# Patient Record
Sex: MALE | Race: WHITE | NOT HISPANIC OR LATINO | Employment: FULL TIME | URBAN - METROPOLITAN AREA
[De-identification: names, ages, dates, MRNs, and addresses within clinical notes are randomized per-mention and may not be internally consistent; named-entity substitution may affect disease eponyms.]

---

## 2021-10-13 ENCOUNTER — TELEPHONE (OUTPATIENT)
Dept: PULMONOLOGY | Facility: CLINIC | Age: 46
End: 2021-10-13

## 2021-10-19 ENCOUNTER — CONSULT (OUTPATIENT)
Dept: PULMONOLOGY | Facility: CLINIC | Age: 46
End: 2021-10-19
Payer: COMMERCIAL

## 2021-10-19 VITALS
TEMPERATURE: 96.7 F | HEART RATE: 71 BPM | BODY MASS INDEX: 45.1 KG/M2 | HEIGHT: 70 IN | OXYGEN SATURATION: 98 % | DIASTOLIC BLOOD PRESSURE: 70 MMHG | WEIGHT: 315 LBS | SYSTOLIC BLOOD PRESSURE: 125 MMHG

## 2021-10-19 DIAGNOSIS — E66.01 MORBID OBESITY (HCC): ICD-10-CM

## 2021-10-19 DIAGNOSIS — I10 PRIMARY HYPERTENSION: ICD-10-CM

## 2021-10-19 DIAGNOSIS — G47.33 OSA (OBSTRUCTIVE SLEEP APNEA): Primary | ICD-10-CM

## 2021-10-19 PROCEDURE — 99204 OFFICE O/P NEW MOD 45 MIN: CPT | Performed by: INTERNAL MEDICINE

## 2021-10-19 RX ORDER — SPIRONOLACTONE 25 MG/1
TABLET ORAL
COMMUNITY
Start: 2021-10-18

## 2021-10-19 RX ORDER — AMLODIPINE BESYLATE 5 MG/1
TABLET ORAL
COMMUNITY
Start: 2021-10-18

## 2021-10-19 RX ORDER — DOXAZOSIN MESYLATE 4 MG/1
4 TABLET ORAL 2 TIMES DAILY
COMMUNITY
Start: 2021-09-25

## 2021-10-19 RX ORDER — ALLOPURINOL 100 MG/1
TABLET ORAL
COMMUNITY
Start: 2021-10-18

## 2021-10-19 RX ORDER — METOPROLOL TARTRATE 50 MG/1
TABLET, FILM COATED ORAL
COMMUNITY
Start: 2021-10-18

## 2021-10-19 RX ORDER — ACETAMINOPHEN 325 MG/1
TABLET ORAL
COMMUNITY

## 2021-10-19 RX ORDER — DULAGLUTIDE 0.75 MG/.5ML
INJECTION, SOLUTION SUBCUTANEOUS
COMMUNITY
Start: 2021-09-22

## 2021-10-19 RX ORDER — SIMVASTATIN 10 MG
TABLET ORAL
COMMUNITY
Start: 2021-10-18

## 2021-10-19 RX ORDER — HYDROCHLOROTHIAZIDE 25 MG/1
TABLET ORAL
COMMUNITY
Start: 2021-10-18

## 2021-10-19 RX ORDER — HYDRALAZINE HYDROCHLORIDE 50 MG/1
TABLET, FILM COATED ORAL
COMMUNITY
Start: 2021-10-18

## 2021-10-19 RX ORDER — ALPRAZOLAM 0.5 MG/1
0.5 TABLET ORAL EVERY 8 HOURS PRN
COMMUNITY
Start: 2021-09-21

## 2021-10-19 RX ORDER — LOSARTAN POTASSIUM 100 MG/1
TABLET ORAL
COMMUNITY
Start: 2021-10-18

## 2021-10-19 RX ORDER — NAPROXEN SODIUM 220 MG
TABLET ORAL
COMMUNITY

## 2021-10-19 RX ORDER — MULTIVIT-MIN/IRON/FOLIC ACID/K 18-600-40
1 CAPSULE ORAL DAILY
COMMUNITY

## 2022-01-03 ENCOUNTER — HOSPITAL ENCOUNTER (OUTPATIENT)
Dept: SLEEP CENTER | Facility: CLINIC | Age: 47
Discharge: HOME | End: 2022-01-03
Payer: COMMERCIAL

## 2022-01-03 DIAGNOSIS — G47.33 OSA (OBSTRUCTIVE SLEEP APNEA): ICD-10-CM

## 2022-01-03 PROCEDURE — 95811 POLYSOM 6/>YRS CPAP 4/> PARM: CPT | Performed by: INTERNAL MEDICINE

## 2022-01-03 PROCEDURE — 95811 POLYSOM 6/>YRS CPAP 4/> PARM: CPT

## 2022-01-04 NOTE — PROGRESS NOTES
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   Caffeine use:YES- coffee  6 to 18 ounces and soda  12 ounces    Alcohol:Dayshift workers after 5PM: Alcohol use:NO    Typical day for patient:        Study Documentation    Sleep Study Indications: CPAP re-titration study    Sleep Study: Treatment   Optimal PAP pressure: 20/16 cmH2O  Leak:Small  Snore:Eliminated  REM Obtained:yes  Supplemental O2: no    Minimum SaO2 90%  Baseline SaO2 98%  PAP mask tried (list all) large Resmed Airfit F20 full face  PAP mask choice (final) large Resmed Airfit F20 full face  PAP mask type:full face  PAP pressure at which snoring was eliminated 20/16 cmH2O  Minimum SaO2 at final PAP pressure 93%  Mode of Therapy:CPAP  ETCO2:No  CPAP changed to BiPAP:Yes  If yes why intolerance to CPAP    Mode of Therapy:BiPAP    EKG abnormalities: yes  PVC  Epoch 1183     EEG abnormalities: no    Sleep Study Recorded < 2 hours: N/A    Sleep Study Recorded > 2 hours but incomplete study: N/A    Sleep Study Recorded 6 hours but no sleep obtained: NO    Patient classification: employed       Post-Sleep Study    Medication used at bedtime or during sleep study:YES other prescription medications    Patient reports time it took to fall asleep:30 to 60 minutes    Patient reports waking up during study:1 to 2 times  Patient reports returning to sleep in 10 to 30 minutes  Patient reports sleeping 4 to 6 hours without dreaming  Patient reports sleep during study:typical    Patient rated sleepiness: Somewhat sleepy or tired    PAP treatment:yes: Post PAP treatment patient reports feeling unchanged and would wear PAP mask at home

## 2022-01-05 DIAGNOSIS — G47.33 OSA (OBSTRUCTIVE SLEEP APNEA): Primary | ICD-10-CM

## 2022-01-05 NOTE — PROGRESS NOTES
Sleep study results discussed with patient    BiPAP ordered through the [facility]   Patient already on treatment with CPAP

## 2022-01-11 ENCOUNTER — OFFICE VISIT (OUTPATIENT)
Dept: PULMONOLOGY | Facility: CLINIC | Age: 47
End: 2022-01-11
Payer: COMMERCIAL

## 2022-01-11 VITALS
TEMPERATURE: 97.2 F | HEART RATE: 76 BPM | BODY MASS INDEX: 45.1 KG/M2 | DIASTOLIC BLOOD PRESSURE: 80 MMHG | SYSTOLIC BLOOD PRESSURE: 128 MMHG | HEIGHT: 70 IN | OXYGEN SATURATION: 98 % | WEIGHT: 315 LBS

## 2022-01-11 DIAGNOSIS — E66.01 MORBID OBESITY (HCC): ICD-10-CM

## 2022-01-11 DIAGNOSIS — I10 PRIMARY HYPERTENSION: ICD-10-CM

## 2022-01-11 DIAGNOSIS — G47.33 OSA (OBSTRUCTIVE SLEEP APNEA): Primary | ICD-10-CM

## 2022-01-11 PROCEDURE — 99213 OFFICE O/P EST LOW 20 MIN: CPT | Performed by: INTERNAL MEDICINE

## 2022-01-11 RX ORDER — ROSUVASTATIN CALCIUM 10 MG/1
10 TABLET, COATED ORAL DAILY
COMMUNITY
Start: 2021-12-19

## 2022-01-11 NOTE — PROGRESS NOTES
Assessment/Plan:    SANJIV (obstructive sleep apnea)  Patient was diagnosed 10 years ago   Patient had sleep study and titration study in Nov 2021  BiPAP with 20/16 pressures is recommended, however patient has not recieved the new machine yet  Previously was compliant with CPAP machine, had no issues with the mask  - continue on CPAP for now  - patient is motivated to start BiPAP whn the machine arrives      Hypertension  tachon is on multiple medications for HTN amlodipin, losartan, hydralasine, spironolactone  BP is good today in the office  - managed by pcp    Morbid obesity ([facility] )  - counselled on weight loss       Diagnoses and all orders for this visit:    SANJIV (obstructive sleep apnea)    Morbid obesity ([facility] [address] )    Primary hypertension    Other orders  -     rosuvastatin (CRESTOR) 10 MG tablet; Take 10 mg by mouth daily (Patient not taking: Reported on 1/11/2022 )          Subjective:      Patient ID: Ana M Maciel is a 48 y o  male  HPI  Patient comes for follow up of his SANJIV  He HS of SANJIV for 10 years and pt was compliant with CPAP machine  Patient is morbidly obese  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  He had titration study done on 10/19 that showd that he needs BiPAP with 20/16 pressure  The machine was ordered but he has not received it yet  Patient sleeps about 6 h at night and wakes up restful  He sometimes feels sleepy and tired diring the day  Patient is vaccinated for covid  He is not a smoker  He denies SB, chest pain, leg swelling  Review of Systems   Constitutional: Positive for fatigue  Negative for activity change, appetite change, chills, fever and unexpected weight change  HENT: Negative  Eyes: Negative  Respiratory: Negative for apnea, cough, chest tightness, shortness of breath and wheezing  Cardiovascular: Negative for chest pain, palpitations and leg swelling  Skin: Negative for pallor and rash     Neurological: Negative for weakness, light-headedness, numbness and headaches  Psychiatric/Behavioral: Negative  Objective:      /80 (BP Location: Left arm, Patient Position: Sitting, Cuff Size: Large)   Pulse 76   Temp (!) 97 2 °F (36 2 °C) (Tympanic)   Ht 5' 9 5" (1 765 m)   Wt (!) 151 kg (333 lb 4 oz)   SpO2 98%   BMI 48 51 kg/m²          Physical Exam  Constitutional:       General: He is not in acute distress  Appearance: He is well-developed  He is obese  He is not ill-appearing or toxic-appearing  HENT:      Head: Normocephalic and atraumatic  Eyes:      General: No scleral icterus  Extraocular Movements: Extraocular movements intact  Neck:      Thyroid: No thyromegaly  Vascular: No JVD  Cardiovascular:      Rate and Rhythm: Normal rate and regular rhythm  Heart sounds: Normal heart sounds  No murmur heard  No friction rub  No gallop  Pulmonary:      Effort: Pulmonary effort is normal       Breath sounds: Normal breath sounds  No wheezing, rhonchi or rales  Abdominal:      General: Bowel sounds are normal  There is no distension  Palpations: Abdomen is soft  Tenderness: There is no abdominal tenderness  There is no guarding  Musculoskeletal:         General: No tenderness  Normal range of motion  Right lower leg: No edema  Left lower leg: No edema  Skin:     General: Skin is warm  Findings: No erythema or rash  Neurological:      Mental Status: He is alert and oriented to person, place, and time  Cranial Nerves: No cranial nerve deficit     Psychiatric:         Mood and Affect: Mood normal

## 2022-01-11 NOTE — ASSESSMENT & PLAN NOTE
dayanna is on multiple medications for HTN amlodipin, losartan, hydralasine, spironolactone  BP is good today in the office  - managed by pcp

## 2022-01-11 NOTE — LETTER
January 11, 2022     Jacquelin Jeans, MD  [address] [place] [address]    Patient: Amilcar Fuentes   YOB: 1975   Date of Visit: 1/11/2022       Dear Dr Ludivina Stringer: Thank you for referring Verna Whiting to me for evaluation  Below are my notes for this consultation  If you have questions, please do not hesitate to call me  I look forward to following your patient along with you  Sincerely,        Ximena Pineda MD        CC: No Recipients  Kushal Cueva MD  1/11/2022  1:55 PM  Attested  Assessment/Plan:    SANJIV (obstructive sleep apnea)  Patient was diagnosed 10 years ago   Patient had sleep study and titration study in Nov 2021  BiPAP with 20/16 pressures is recommended, however patient has not recieved the new machine yet  Previously was compliant with CPAP machine, had no issues with the mask  - continue on CPAP for now  - patient is motivated to start BiPAP whn the machine arrives      Hypertension  patietn is on multiple medications for HTN amlodipin, losartan, hydralasine, spironolactone  BP is good today in the office  - managed by pcp    Morbid obesity ([facility] [address] )  - counselled on weight loss       Diagnoses and all orders for this visit:    SANJIV (obstructive sleep apnea)    Morbid obesity ([facility] [address] )    Primary hypertension    Other orders  -     rosuvastatin (CRESTOR) 10 MG tablet; Take 10 mg by mouth daily (Patient not taking: Reported on 1/11/2022 )          Subjective:      Patient ID: Adriel Roca is a 48 y o  male  HPI  Patient comes for follow up of his SANJIV  He HS of SANJIV for 10 years and pt was compliant with CPAP machine  Patient is morbidly obese  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  He had titration study done on 10/19 that showd that he needs BiPAP with 20/16 pressure  The machine was ordered but he has not received it yet  Patient sleeps about 6 h at night and wakes up restful   He sometimes feels sleepy and tired diring the day  Patient is vaccinated for covid  He is not a smoker  He denies SB, chest pain, leg swelling  Review of Systems   Constitutional: Positive for fatigue  Negative for activity change, appetite change, chills, fever and unexpected weight change  HENT: Negative  Eyes: Negative  Respiratory: Negative for apnea, cough, chest tightness, shortness of breath and wheezing  Cardiovascular: Negative for chest pain, palpitations and leg swelling  Skin: Negative for pallor and rash  Neurological: Negative for weakness, light-headedness, numbness and headaches  Psychiatric/Behavioral: Negative  Objective:      /80 (BP Location: Left arm, Patient Position: Sitting, Cuff Size: Large)   Pulse 76   Temp (!) 97 2 °F (36 2 °C) (Tympanic)   Ht 5' 9 5" (1 765 m)   Wt (!) 151 kg (333 lb 4 oz)   SpO2 98%   BMI 48 51 kg/m²          Physical Exam  Constitutional:       General: He is not in acute distress  Appearance: He is well-developed  He is obese  He is not ill-appearing or toxic-appearing  HENT:      Head: Normocephalic and atraumatic  Eyes:      General: No scleral icterus  Extraocular Movements: Extraocular movements intact  Neck:      Thyroid: No thyromegaly  Vascular: No JVD  Cardiovascular:      Rate and Rhythm: Normal rate and regular rhythm  Heart sounds: Normal heart sounds  No murmur heard  No friction rub  No gallop  Pulmonary:      Effort: Pulmonary effort is normal       Breath sounds: Normal breath sounds  No wheezing, rhonchi or rales  Abdominal:      General: Bowel sounds are normal  There is no distension  Palpations: Abdomen is soft  Tenderness: There is no abdominal tenderness  There is no guarding  Musculoskeletal:         General: No tenderness  Normal range of motion  Right lower leg: No edema  Left lower leg: No edema  Skin:     General: Skin is warm  Findings: No erythema or rash     Neurological: Mental Status: He is alert and oriented to person, place, and time  Cranial Nerves: No cranial nerve deficit  Psychiatric:         Mood and Affect: Mood normal          Attestation signed by Belgica Ariza MD at 1/11/2022  5:10 PM:  Pulmonary attending:  Dr Akanksha Hodge    The patient examined, plan discussed with resident, reviewed and agree with resident notes with corrections  Patient with obstructive sleep apnea currently requiring BiPAP 20/16 cm of water  Has been using his old machine  [facility] BiPAP machine has been ordered, waiting to be set up  Still has some daytime sleepiness  Very motivated to continue on CPAP therapy  His compliance records are not available  He has history of hypertension has been on multiple medications including spironolactone hydralazine losartan and amlodipine  He is also morbidly obese and understands the need for weight reduction his chest was clear to auscultation  He has COVID vaccinated    I had a long discussion with him and answered all his questions

## 2022-01-11 NOTE — ASSESSMENT & PLAN NOTE
Patient was diagnosed 10 years ago   Patient had sleep study and titration study in Nov 2021  BiPAP with 20/16 pressures is recommended, however patient has not recieved the new machine yet  Previously was compliant with CPAP machine, had no issues with the mask  - continue on CPAP for now  - patient is motivated to start BiPAP whn the machine arrives

## 2022-04-05 ENCOUNTER — TELEPHONE (OUTPATIENT)
Dept: OTHER | Facility: OTHER | Age: 47
End: 2022-04-05

## 2022-04-05 NOTE — TELEPHONE ENCOUNTER
Patient called in to confirm appointment day and time for next week  Provided patient with appointment details and advised he call back if he has any other questions

## 2022-04-12 ENCOUNTER — OFFICE VISIT (OUTPATIENT)
Dept: PULMONOLOGY | Facility: CLINIC | Age: 47
End: 2022-04-12
Payer: COMMERCIAL

## 2022-04-12 VITALS
HEIGHT: 70 IN | DIASTOLIC BLOOD PRESSURE: 75 MMHG | HEART RATE: 78 BPM | OXYGEN SATURATION: 98 % | WEIGHT: 315 LBS | TEMPERATURE: 98.4 F | SYSTOLIC BLOOD PRESSURE: 126 MMHG | BODY MASS INDEX: 45.1 KG/M2

## 2022-04-12 DIAGNOSIS — E66.01 MORBID OBESITY (HCC): ICD-10-CM

## 2022-04-12 DIAGNOSIS — G47.33 OSA (OBSTRUCTIVE SLEEP APNEA): Primary | ICD-10-CM

## 2022-04-12 DIAGNOSIS — I10 PRIMARY HYPERTENSION: ICD-10-CM

## 2022-04-12 PROCEDURE — 99213 OFFICE O/P EST LOW 20 MIN: CPT | Performed by: INTERNAL MEDICINE

## 2022-04-12 NOTE — PROGRESS NOTES
Assessment/Plan:    SANJIV (obstructive sleep apnea)  Mr Vel Fuentes was diagnosed with obstructive sleep apnea more than 10 years back and was started on CPAP therapy  He has been using the CPAP regularly since then  His last titration study was more than 5 years back  He was buying the supplies online  Previously he was with the [facility] medical   He used to follow with Dr Wesley Diaz     He has hypertension as comorbidity  On clinical examination, he was morbidly obese and had Mallampati class 3 oropharyngeal crowding  his repeat sleep study showed BiPAP requirement of 20/16 with a full face mask to correct his sleep apnea  He has been using the BIPAP regularly and his compliance is excellent  His residual AHI was low  He is motivated to continue on BiPAP therapy  He is getting clinical benefit from BiPAP therapy  I had a long discussion with him and I have answered all his questions  Morbid obesity ([address] )  He is morbidly obese and I have counseled him to lose weight  With COVID he has gained more weight and states that he is trying to lose weight at this point  I have advised him regarding modifying diet and exercising regularly        Hypertension  He has history of hypertension and has been on treatment with metoprolol losartan hydrochlorothiazide and doxazosin          Diagnoses and all orders for this visit:    SANJIV (obstructive sleep apnea)    Primary hypertension    Morbid obesity (HCC)          Subjective:      Patient ID: Marianne Carlos is a 48 y o  male  Mr Vel Fuentes for follow-up for his obstructive sleep apnea on CPAP therapy  Currently he is using the CPAP every night and is comfortable with the mask and pressure  He has no significant daytime sleepiness or morning headache  I reviewed his CPAP compliance records and they are excellent  His residual AHI is very low  He is very motivated to continue on CPAP therapy    He is also very obese and understands the need for weight reduction  He has history of hypertension and has been on treatment with spironolactone and hydrochlorothiazide  The following portions of the patient's history were reviewed and updated as appropriate: allergies, current medications, past family history, past medical history, past social history, past surgical history and problem list     Review of Systems   Constitutional: Negative for chills and fever  HENT: Negative for hearing loss, rhinorrhea, sneezing, sore throat and trouble swallowing  Eyes: Negative for visual disturbance  Respiratory: Negative for cough, shortness of breath and wheezing  Cardiovascular: Negative for chest pain, palpitations and leg swelling  Gastrointestinal: Negative for abdominal pain, constipation, diarrhea, nausea and vomiting  Genitourinary: Negative for dysuria, frequency and urgency  Musculoskeletal: Positive for arthralgias  Skin: Negative for rash  Allergic/Immunologic: Negative for environmental allergies  Neurological: Negative for dizziness, seizures, syncope, light-headedness and headaches  Psychiatric/Behavioral: Negative for agitation and sleep disturbance  The patient is not nervous/anxious  Objective:      /75 (BP Location: Left arm, Patient Position: Sitting, Cuff Size: Adult)   Pulse 78   Temp 98 4 °F (36 9 °C) (Tympanic)   Ht 5' 9 5" (1 765 m)   Wt (!) 150 kg (330 lb)   SpO2 98%   BMI 48 03 kg/m²          Physical Exam  Vitals reviewed  Constitutional:       General: He is not in acute distress  Appearance: He is obese  He is not ill-appearing, toxic-appearing or diaphoretic  HENT:      Head: Normocephalic  Mouth/Throat:      Mouth: Mucous membranes are moist    Eyes:      General: No scleral icterus  Conjunctiva/sclera: Conjunctivae normal    Cardiovascular:      Rate and Rhythm: Normal rate and regular rhythm  Heart sounds: Normal heart sounds  No murmur heard        Pulmonary: Effort: No respiratory distress  Breath sounds: Normal breath sounds  No stridor  No wheezing, rhonchi or rales  Abdominal:      General: Bowel sounds are normal       Palpations: Abdomen is soft  Tenderness: There is no abdominal tenderness  There is no guarding  Musculoskeletal:      Cervical back: No rigidity  Right lower leg: No edema  Left lower leg: No edema  Lymphadenopathy:      Cervical: No cervical adenopathy  Skin:     Coloration: Skin is not jaundiced or pale  Findings: No rash  Neurological:      Mental Status: He is alert and oriented to person, place, and time  Gait: Gait normal    Psychiatric:         Mood and Affect: Mood normal          Behavior: Behavior normal          Thought Content:  Thought content normal          Judgment: Judgment normal

## 2022-04-12 NOTE — ASSESSMENT & PLAN NOTE
He has history of hypertension and has been on treatment with metoprolol losartan hydrochlorothiazide and doxazosin  Laurence Press

## 2022-04-12 NOTE — ASSESSMENT & PLAN NOTE
Xiomara Mendoza was diagnosed with obstructive sleep apnea more than 10 years back and was started on CPAP therapy  He has been using the CPAP regularly since then  His last titration study was more than 5 years back  He was buying the supplies online  Previously he was with the [facility] medical   He used to follow with Dr Casimiro Ly     He has hypertension as comorbidity  On clinical examination, he was morbidly obese and had Mallampati class 3 oropharyngeal crowding  his repeat sleep study showed BiPAP requirement of 20/16 with a full face mask to correct his sleep apnea  He has been using the BIPAP regularly and his compliance is excellent  His residual AHI was low  He is motivated to continue on BiPAP therapy  He is getting clinical benefit from BiPAP therapy  I had a long discussion with him and I have answered all his questions

## 2022-04-12 NOTE — ASSESSMENT & PLAN NOTE
He is morbidly obese and I have counseled him to lose weight  With COVID he has gained more weight and states that he is trying to lose weight at this point    I have advised him regarding modifying diet and exercising regularly

## 2022-08-15 ENCOUNTER — HOSPITAL ENCOUNTER (INPATIENT)
Facility: HOSPITAL | Age: 47
LOS: 3 days | Discharge: HOME | DRG: 580 | End: 2022-08-18
Attending: INTERNAL MEDICINE | Admitting: STUDENT IN AN ORGANIZED HEALTH CARE EDUCATION/TRAINING PROGRAM
Payer: COMMERCIAL

## 2022-08-15 ENCOUNTER — APPOINTMENT (EMERGENCY)
Dept: CT IMAGING | Facility: HOSPITAL | Age: 47
DRG: 580 | End: 2022-08-15
Payer: COMMERCIAL

## 2022-08-15 DIAGNOSIS — I10 PRIMARY HYPERTENSION: ICD-10-CM

## 2022-08-15 DIAGNOSIS — L02.11 CELLULITIS AND ABSCESS OF NECK: Primary | ICD-10-CM

## 2022-08-15 DIAGNOSIS — E66.01 MORBID OBESITY (HCC): ICD-10-CM

## 2022-08-15 DIAGNOSIS — E11.9 TYPE 2 DIABETES MELLITUS (HCC): ICD-10-CM

## 2022-08-15 DIAGNOSIS — E11.65 UNCONTROLLED TYPE 2 DIABETES MELLITUS WITH HYPERGLYCEMIA (HCC): ICD-10-CM

## 2022-08-15 DIAGNOSIS — L03.221 CELLULITIS AND ABSCESS OF NECK: Primary | ICD-10-CM

## 2022-08-15 PROBLEM — E87.1 HYPONATREMIA: Status: ACTIVE | Noted: 2022-08-15

## 2022-08-15 PROBLEM — R79.89 ELEVATED SERUM CREATININE: Status: ACTIVE | Noted: 2022-08-15

## 2022-08-15 PROBLEM — E78.5 HYPERLIPIDEMIA: Status: ACTIVE | Noted: 2022-08-15

## 2022-08-15 PROBLEM — E87.2 LACTIC ACIDOSIS: Status: ACTIVE | Noted: 2022-08-15

## 2022-08-15 PROBLEM — E87.20 LACTIC ACIDOSIS: Status: ACTIVE | Noted: 2022-08-15

## 2022-08-15 LAB
ALBUMIN SERPL BCP-MCNC: 4.7 G/DL (ref 3.5–5)
ALP SERPL-CCNC: 66 U/L (ref 34–104)
ALT SERPL W P-5'-P-CCNC: 32 U/L (ref 7–52)
ANION GAP SERPL CALCULATED.3IONS-SCNC: 13 MMOL/L (ref 4–13)
AST SERPL W P-5'-P-CCNC: 26 U/L (ref 13–39)
BASOPHILS # BLD AUTO: 0.03 THOUSANDS/ΜL (ref 0–0.1)
BASOPHILS NFR BLD AUTO: 0 % (ref 0–1)
BILIRUB SERPL-MCNC: 0.38 MG/DL (ref 0.2–1)
BUN SERPL-MCNC: 29 MG/DL (ref 5–25)
CALCIUM SERPL-MCNC: 9.8 MG/DL (ref 8.4–10.2)
CHLORIDE SERPL-SCNC: 96 MMOL/L (ref 96–108)
CO2 SERPL-SCNC: 20 MMOL/L (ref 21–32)
CREAT SERPL-MCNC: 1.62 MG/DL (ref 0.6–1.3)
EOSINOPHIL # BLD AUTO: 0.08 THOUSAND/ΜL (ref 0–0.61)
EOSINOPHIL NFR BLD AUTO: 1 % (ref 0–6)
ERYTHROCYTE [DISTWIDTH] IN BLOOD BY AUTOMATED COUNT: 13.2 % (ref 11.6–15.1)
GFR SERPL CREATININE-BSD FRML MDRD: 49 ML/MIN/1.73SQ M
GLUCOSE SERPL-MCNC: 214 MG/DL (ref 65–140)
GLUCOSE SERPL-MCNC: 374 MG/DL (ref 65–140)
HCT VFR BLD AUTO: 37.9 % (ref 36.5–49.3)
HGB BLD-MCNC: 13.2 G/DL (ref 12–17)
IMM GRANULOCYTES # BLD AUTO: 0.03 THOUSAND/UL (ref 0–0.2)
IMM GRANULOCYTES NFR BLD AUTO: 0 % (ref 0–2)
LACTATE SERPL-SCNC: 1.5 MMOL/L (ref 0.5–2)
LACTATE SERPL-SCNC: 2.1 MMOL/L (ref 0.5–2)
LYMPHOCYTES # BLD AUTO: 0.91 THOUSANDS/ΜL (ref 0.6–4.47)
LYMPHOCYTES NFR BLD AUTO: 11 % (ref 14–44)
MCH RBC QN AUTO: 30.7 PG (ref 26.8–34.3)
MCHC RBC AUTO-ENTMCNC: 34.8 G/DL (ref 31.4–37.4)
MCV RBC AUTO: 88 FL (ref 82–98)
MONOCYTES # BLD AUTO: 0.53 THOUSAND/ΜL (ref 0.17–1.22)
MONOCYTES NFR BLD AUTO: 7 % (ref 4–12)
NEUTROPHILS # BLD AUTO: 6.38 THOUSANDS/ΜL (ref 1.85–7.62)
NEUTS SEG NFR BLD AUTO: 81 % (ref 43–75)
NRBC BLD AUTO-RTO: 0 /100 WBCS
PLATELET # BLD AUTO: 144 THOUSANDS/UL (ref 149–390)
PMV BLD AUTO: 9.2 FL (ref 8.9–12.7)
POTASSIUM SERPL-SCNC: 3.7 MMOL/L (ref 3.5–5.3)
PROT SERPL-MCNC: 8 G/DL (ref 6.4–8.4)
RBC # BLD AUTO: 4.3 MILLION/UL (ref 3.88–5.62)
SODIUM SERPL-SCNC: 129 MMOL/L (ref 135–147)
WBC # BLD AUTO: 7.96 THOUSAND/UL (ref 4.31–10.16)

## 2022-08-15 PROCEDURE — 87205 SMEAR GRAM STAIN: CPT | Performed by: INTERNAL MEDICINE

## 2022-08-15 PROCEDURE — 96365 THER/PROPH/DIAG IV INF INIT: CPT

## 2022-08-15 PROCEDURE — 70491 CT SOFT TISSUE NECK W/DYE: CPT

## 2022-08-15 PROCEDURE — G1004 CDSM NDSC: HCPCS

## 2022-08-15 PROCEDURE — 82948 REAGENT STRIP/BLOOD GLUCOSE: CPT

## 2022-08-15 PROCEDURE — 83036 HEMOGLOBIN GLYCOSYLATED A1C: CPT | Performed by: PHYSICIAN ASSISTANT

## 2022-08-15 PROCEDURE — 99284 EMERGENCY DEPT VISIT MOD MDM: CPT

## 2022-08-15 PROCEDURE — 83605 ASSAY OF LACTIC ACID: CPT | Performed by: INTERNAL MEDICINE

## 2022-08-15 PROCEDURE — 87077 CULTURE AEROBIC IDENTIFY: CPT | Performed by: INTERNAL MEDICINE

## 2022-08-15 PROCEDURE — 87186 SC STD MICRODIL/AGAR DIL: CPT | Performed by: INTERNAL MEDICINE

## 2022-08-15 PROCEDURE — 87070 CULTURE OTHR SPECIMN AEROBIC: CPT | Performed by: INTERNAL MEDICINE

## 2022-08-15 PROCEDURE — 36415 COLL VENOUS BLD VENIPUNCTURE: CPT | Performed by: INTERNAL MEDICINE

## 2022-08-15 PROCEDURE — 99285 EMERGENCY DEPT VISIT HI MDM: CPT | Performed by: INTERNAL MEDICINE

## 2022-08-15 PROCEDURE — 99222 1ST HOSP IP/OBS MODERATE 55: CPT | Performed by: PHYSICIAN ASSISTANT

## 2022-08-15 PROCEDURE — 96372 THER/PROPH/DIAG INJ SC/IM: CPT

## 2022-08-15 PROCEDURE — 80053 COMPREHEN METABOLIC PANEL: CPT | Performed by: INTERNAL MEDICINE

## 2022-08-15 PROCEDURE — 96367 TX/PROPH/DG ADDL SEQ IV INF: CPT

## 2022-08-15 PROCEDURE — 87154 CUL TYP ID BLD PTHGN 6+ TRGT: CPT | Performed by: INTERNAL MEDICINE

## 2022-08-15 PROCEDURE — 85025 COMPLETE CBC W/AUTO DIFF WBC: CPT | Performed by: INTERNAL MEDICINE

## 2022-08-15 PROCEDURE — 87040 BLOOD CULTURE FOR BACTERIA: CPT | Performed by: INTERNAL MEDICINE

## 2022-08-15 RX ORDER — SPIRONOLACTONE 25 MG/1
25 TABLET ORAL DAILY
Status: DISCONTINUED | OUTPATIENT
Start: 2022-08-16 | End: 2022-08-18 | Stop reason: HOSPADM

## 2022-08-15 RX ORDER — DOXAZOSIN MESYLATE 4 MG/1
4 TABLET ORAL 2 TIMES DAILY
Status: DISCONTINUED | OUTPATIENT
Start: 2022-08-15 | End: 2022-08-18 | Stop reason: HOSPADM

## 2022-08-15 RX ORDER — METOPROLOL TARTRATE 50 MG/1
50 TABLET, FILM COATED ORAL EVERY 12 HOURS SCHEDULED
Status: DISCONTINUED | OUTPATIENT
Start: 2022-08-15 | End: 2022-08-18 | Stop reason: HOSPADM

## 2022-08-15 RX ORDER — LOSARTAN POTASSIUM 50 MG/1
100 TABLET ORAL DAILY
Status: DISCONTINUED | OUTPATIENT
Start: 2022-08-16 | End: 2022-08-18 | Stop reason: HOSPADM

## 2022-08-15 RX ORDER — AMLODIPINE BESYLATE 5 MG/1
5 TABLET ORAL DAILY
Status: DISCONTINUED | OUTPATIENT
Start: 2022-08-16 | End: 2022-08-18 | Stop reason: HOSPADM

## 2022-08-15 RX ORDER — CEFAZOLIN SODIUM 2 G/50ML
2000 SOLUTION INTRAVENOUS EVERY 8 HOURS
Status: DISCONTINUED | OUTPATIENT
Start: 2022-08-15 | End: 2022-08-16

## 2022-08-15 RX ORDER — VANCOMYCIN HYDROCHLORIDE 1 G/200ML
15 INJECTION, SOLUTION INTRAVENOUS ONCE
Status: DISCONTINUED | OUTPATIENT
Start: 2022-08-15 | End: 2022-08-15

## 2022-08-15 RX ORDER — ONDANSETRON 2 MG/ML
4 INJECTION INTRAMUSCULAR; INTRAVENOUS EVERY 6 HOURS PRN
Status: DISCONTINUED | OUTPATIENT
Start: 2022-08-15 | End: 2022-08-18 | Stop reason: HOSPADM

## 2022-08-15 RX ORDER — ACETAMINOPHEN 325 MG/1
650 TABLET ORAL EVERY 6 HOURS PRN
Status: DISCONTINUED | OUTPATIENT
Start: 2022-08-15 | End: 2022-08-18 | Stop reason: HOSPADM

## 2022-08-15 RX ORDER — CEFTRIAXONE 1 G/50ML
1000 INJECTION, SOLUTION INTRAVENOUS ONCE
Status: COMPLETED | OUTPATIENT
Start: 2022-08-15 | End: 2022-08-15

## 2022-08-15 RX ORDER — HEPARIN SODIUM 5000 [USP'U]/ML
5000 INJECTION, SOLUTION INTRAVENOUS; SUBCUTANEOUS ONCE
Status: COMPLETED | OUTPATIENT
Start: 2022-08-15 | End: 2022-08-15

## 2022-08-15 RX ORDER — HYDRALAZINE HYDROCHLORIDE 25 MG/1
50 TABLET, FILM COATED ORAL EVERY 12 HOURS
Status: DISCONTINUED | OUTPATIENT
Start: 2022-08-15 | End: 2022-08-18 | Stop reason: HOSPADM

## 2022-08-15 RX ORDER — HYDROCHLOROTHIAZIDE 25 MG/1
25 TABLET ORAL DAILY
Status: DISCONTINUED | OUTPATIENT
Start: 2022-08-16 | End: 2022-08-18

## 2022-08-15 RX ORDER — INSULIN LISPRO 100 [IU]/ML
2-12 INJECTION, SOLUTION INTRAVENOUS; SUBCUTANEOUS
Status: DISCONTINUED | OUTPATIENT
Start: 2022-08-15 | End: 2022-08-18 | Stop reason: HOSPADM

## 2022-08-15 RX ORDER — PRAVASTATIN SODIUM 20 MG
20 TABLET ORAL
Status: DISCONTINUED | OUTPATIENT
Start: 2022-08-16 | End: 2022-08-18 | Stop reason: HOSPADM

## 2022-08-15 RX ORDER — INSULIN LISPRO 100 [IU]/ML
2-12 INJECTION, SOLUTION INTRAVENOUS; SUBCUTANEOUS
Status: DISCONTINUED | OUTPATIENT
Start: 2022-08-16 | End: 2022-08-18 | Stop reason: HOSPADM

## 2022-08-15 RX ORDER — SODIUM CHLORIDE 9 MG/ML
75 INJECTION, SOLUTION INTRAVENOUS CONTINUOUS
Status: DISCONTINUED | OUTPATIENT
Start: 2022-08-15 | End: 2022-08-16

## 2022-08-15 RX ORDER — ALPRAZOLAM 0.5 MG/1
0.5 TABLET ORAL EVERY 8 HOURS PRN
Status: DISCONTINUED | OUTPATIENT
Start: 2022-08-15 | End: 2022-08-18 | Stop reason: HOSPADM

## 2022-08-15 RX ADMIN — HYDRALAZINE HYDROCHLORIDE 50 MG: 25 TABLET, FILM COATED ORAL at 20:53

## 2022-08-15 RX ADMIN — METOPROLOL TARTRATE 50 MG: 50 TABLET, FILM COATED ORAL at 20:49

## 2022-08-15 RX ADMIN — ACETAMINOPHEN 650 MG: 325 TABLET, FILM COATED ORAL at 21:08

## 2022-08-15 RX ADMIN — DOXAZOSIN 4 MG: 4 TABLET ORAL at 20:49

## 2022-08-15 RX ADMIN — HEPARIN SODIUM 5000 UNITS: 5000 INJECTION INTRAVENOUS; SUBCUTANEOUS at 20:52

## 2022-08-15 RX ADMIN — VANCOMYCIN HYDROCHLORIDE 1500 MG: 1 INJECTION, POWDER, LYOPHILIZED, FOR SOLUTION INTRAVENOUS at 18:07

## 2022-08-15 RX ADMIN — INSULIN HUMAN 8 UNITS: 100 INJECTION, SOLUTION PARENTERAL at 18:05

## 2022-08-15 RX ADMIN — SODIUM CHLORIDE 150 ML/HR: 0.9 INJECTION, SOLUTION INTRAVENOUS at 17:15

## 2022-08-15 RX ADMIN — SODIUM CHLORIDE 1000 ML: 0.9 INJECTION, SOLUTION INTRAVENOUS at 18:08

## 2022-08-15 RX ADMIN — INSULIN LISPRO 4 UNITS: 100 INJECTION, SOLUTION INTRAVENOUS; SUBCUTANEOUS at 21:08

## 2022-08-15 RX ADMIN — IOHEXOL 65 ML: 350 INJECTION, SOLUTION INTRAVENOUS at 17:54

## 2022-08-15 RX ADMIN — CEFTRIAXONE 1000 MG: 1 INJECTION, SOLUTION INTRAVENOUS at 17:17

## 2022-08-15 RX ADMIN — CEFAZOLIN SODIUM 2000 MG: 2 SOLUTION INTRAVENOUS at 20:52

## 2022-08-15 NOTE — H&P
[address]   H&P- Holly Prieto Digirolamo 1975, 48 y o  male MRN: [id number]  Unit/Bed#: -01 Encounter: [id number]  Primary Care Provider: Dorota Canales MD   Date and time admitted to hospital: 8/15/2022  3:54 PM    * Cellulitis and abscess of neck  Assessment & Plan  Patient presents with 2 days of right posterior neck pain, erythema, drainage  States that he believes its from his BiPAP straps at home  · CT soft tissue neck w/ contrast: deep and superficial soft tissue cellulitis in the right suboccipital region  Superficial subcutaneous 6mm abscess   · Currently afebrile and without leukocytosis  Hemodynamically stable  · Lactic acid 2 1, repeat in 2h     · NPO at midnight for abscess drainage tomorrow   · IVF   · IV abx: Cefazolin + Vancomycin   · General surgery consult appreciated    Type 2 diabetes mellitus ([facility] [address] )  Assessment & Plan  No results found for: HGBA1C    No results for input(s): POCGLU in the last 72 hours      Blood Sugar Average: Last 72 hrs:     · Check HgbA1c  · Hyperglycemia 374 on admission, given 8U regular insulin in the ED   · PTA regimen: metformin and Trulicity although patient states he has not been using Trulicity because he thought that was a reflux medication   · Inpatient regimen: SSI with accuchecks- adjust as necessary   · Patient states his glucometer broke, he will need new supplies at discharge  · Avoid hypoglycemia     Elevated serum creatinine  Assessment & Plan  · Creatinine 1 62 with GFR 49 on admission   · No prior labs available for review, unknown baseline   · IVF    · I/O  · Avoid nephrotoxins/hypotension   · Continue to trend Cr     Hyponatremia  Assessment & Plan  · Pseudohyponatremia in setting of hyperglycemia   · Corrected sodium for hyperglycemia 133   · Will continue HCTZ for now due to hypertension, if sodium worsens despite correcting hyperglycemia, consider holding    Hyperlipidemia  Assessment & Plan  Continue statin     SANJIV (obstructive sleep apnea)  Assessment & Plan  · Continue BiPAP qhs as tolerated with pain, patient with right suboccipital cellulitis with abscess    Hypertension  Assessment & Plan  · BP elevated on admission 171/84  · Continue PTA antihypertensives: doxazosin, hydralazine, Norvasc, HCTZ, losartan, spironolactone, lopressor     Morbid obesity (HCC)  Assessment & Plan  · BMI 48 73  · Encourage lifestyle modification/weight loss   · Nutrition consult       VTE Pharmacologic Prophylaxis: VTE Score: 4 Moderate Risk (Score 3-4) - Pharmacological DVT Prophylaxis Ordered: heparin  Code Status: Level 1 - Full Code   Discussion with family: Patient declined call to   Anticipated Length of Stay: Patient will be admitted on an inpatient basis with an anticipated length of stay of greater than 2 midnights secondary to right neck cellulitis with abscess   Total Time for Visit, including Counseling / Coordination of Care: 45 minutes Greater than 50% of this total time spent on direct patient counseling and coordination of care  Chief Complaint: right posterior neck pain x 2 days     History of Present Illness:  Ruthell Fleischer is a 48 y o  male with a PMH of morbid obesity, HTN, HLD, T2DM, SANJIV on BiPAP qhs who presents with right posterior neck pain, erythema, drainage x 2 days  He denies any fever/chills at home  He believes that this all started due to irritation from his BiPAP straps which he uses at night for his SANJIV  He states that he currently is the owner of a local [employer] and is hoping to be back to work by Wednesday  He states that in regards to his diabetes, he has not been checking his sugars at home because his glucometer broke  He also states he was recently started on Trulicity but has not been taking this medication because he thought it was a PRN med for GERD  He endorses difficulty with weight loss due to his job working in an [employer]       Review of Systems:  Review of Systems   Constitutional: Negative for activity change, appetite change, chills, fatigue and fever  HENT: Negative for sore throat  Eyes: Negative for visual disturbance  Respiratory: Negative for cough and shortness of breath  Cardiovascular: Negative for chest pain, palpitations and leg swelling  Gastrointestinal: Negative for abdominal pain, diarrhea, nausea and vomiting  Genitourinary: Negative for dysuria  Musculoskeletal: Positive for neck pain  Skin: Positive for color change  Neurological: Negative for dizziness, weakness, light-headedness and headaches  Psychiatric/Behavioral: Negative for confusion  Past Medical and Surgical History:   Past Medical History:   Diagnosis Date    Sleep apnea, obstructive        History reviewed  No pertinent surgical history  Meds/Allergies:  Prior to Admission medications    Medication Sig Start Date End Date Taking?  Authorizing Provider   acetaminophen (Tylenol) 325 mg tablet Take by mouth    Historical Provider, MD   allopurinol (ZYLOPRIM) 100 mg tablet  10/18/21   Historical Provider, MD   ALPRAZolam [place]) 0 5 mg tablet Take 0 5 mg by mouth every 8 (eight) hours as needed 9/21/21   Historical Provider, MD   amLODIPine (NORVASC) 5 mg tablet  10/18/21   Historical Provider, MD   Aspirin Buf,CaCarb-MgCarb-MgO, 81 MG TABS Take 1 tablet by mouth daily    Historical Provider, MD   Cholecalciferol (Vitamin D) 50 MCG (2000 UT) CAPS Take 1 tablet by mouth daily    Historical Provider, MD   doxazosin (CARDURA) 4 mg tablet Take 4 mg by mouth 2 (two) times a day 9/25/21   Historical Provider, MD   hydrALAZINE (APRESOLINE) 50 mg tablet  10/18/21   Historical Provider, MD   hydrochlorothiazide (HYDRODIURIL) 25 mg tablet  10/18/21   Historical Provider, MD   losartan (COZAAR) 100 MG tablet  10/18/21   Historical Provider, MD   metFORMIN (GLUCOPHAGE) 1000 MG tablet  10/18/21   Historical Provider, MD   metoprolol tartrate (LOPRESSOR) 50 mg tablet 10/18/21   Historical Provider, MD   Misc Natural Products (OSTEO BI-FLEX/5-LOXIN ADVANCED PO) Take by mouth 8/19/14   Historical Provider, MD   Multiple Vitamin (MULTIVITAMIN ADULT PO) Take by mouth    Historical Provider, MD   naproxen sodium (Aleve) 220 MG tablet Take by mouth    Historical Provider, MD   rosuvastatin (CRESTOR) 10 MG tablet Take 10 mg by mouth daily  Patient not taking: Reported on 1/11/2022 12/19/21   Historical Provider, MD   simvastatin (ZOCOR) 10 mg tablet  10/18/21   Historical Provider, MD   spironolactone (ALDACTONE) 25 mg tablet  10/18/21   Historical Provider, MD   Trulicity 2 41 GO/8 0FA SOPN inject 0 5 milliliters ( 0 75 milligrams ) subcutaneously every week 9/22/21   Historical Provider, MD     I have reviewed home medications with patient personally  Allergies: No Known Allergies    Social History:  Marital Status: Single   Patient Pre-hospital Living Situation: Home  Patient Pre-hospital Level of Mobility: walks  Patient Pre-hospital Diet Restrictions: diabetic   Substance Use History:   Social History     Substance and Sexual Activity   Alcohol Use Never     Social History     Tobacco Use   Smoking Status Never Smoker   Smokeless Tobacco Current User    Types: Chew   Tobacco Comment    chewing tobacco occasionally      Social History     Substance and Sexual Activity   Drug Use Never       Family History:  History reviewed  No pertinent family history  Physical Exam:     Vitals:   Blood Pressure: 160/85 (08/15/22 2001)  Pulse: 103 (08/15/22 2001)  Temperature: 98 2 °F (36 8 °C) (08/15/22 2001)  Temp Source: Tympanic (08/15/22 2001)  Respirations: 16 (08/15/22 2001)  Height: 5' 9" (175 3 cm) (08/15/22 2001)  Weight - Scale: (!) 149 kg (329 lb 9 4 oz) (08/15/22 2001)  SpO2: 98 % (08/15/22 2001)    Physical Exam  Constitutional:       General: He is not in acute distress  Appearance: He is obese  He is not ill-appearing or toxic-appearing     HENT:      Mouth/Throat: Mouth: Mucous membranes are moist    Eyes:      General: No scleral icterus  Cardiovascular:      Rate and Rhythm: Normal rate and regular rhythm  Heart sounds: Normal heart sounds  Pulmonary:      Effort: No respiratory distress  Breath sounds: Normal breath sounds  No wheezing, rhonchi or rales  Abdominal:      General: Abdomen is flat  Bowel sounds are normal       Palpations: Abdomen is soft  Tenderness: There is no abdominal tenderness  Musculoskeletal:      Right lower leg: No edema  Left lower leg: No edema  Skin:     General: Skin is warm  Findings: Erythema present  Comments: Right posterior neck with fluctuant abscess with surrounding erythema, swelling, and TTP; purulent discharge noted   Neurological:      Mental Status: He is alert and oriented to person, place, and time  Psychiatric:         Mood and Affect: Mood normal           Additional Data:     Lab Results:  Results from last 7 days   Lab Units 08/15/22  1707   WBC Thousand/uL 7 96   HEMOGLOBIN g/dL 13 2   HEMATOCRIT % 37 9   PLATELETS Thousands/uL 144*   NEUTROS PCT % 81*   LYMPHS PCT % 11*   MONOS PCT % 7   EOS PCT % 1     Results from last 7 days   Lab Units 08/15/22  1707   SODIUM mmol/L 129*   POTASSIUM mmol/L 3 7   CHLORIDE mmol/L 96   CO2 mmol/L 20*   BUN mg/dL 29*   CREATININE mg/dL 1 62*   ANION GAP mmol/L 13   CALCIUM mg/dL 9 8   ALBUMIN g/dL 4 7   TOTAL BILIRUBIN mg/dL 0 38   ALK PHOS U/L 66   ALT U/L 32   AST U/L 26   GLUCOSE RANDOM mg/dL 374*                 Results from last 7 days   Lab Units 08/15/22  1707   LACTIC ACID mmol/L 2 1*       Imaging: Reviewed radiology reports from this admission including: CT soft tissue neck   CT soft tissue neck with contrast   Final Result by Jose Garcia MD (08/15 1821)      Deep and superficial soft tissue cellulitis in the right suboccipital region  Superficial subcutaneous 6 mm abscess              Workstation performed: [id number] EKG and Other Studies Reviewed on Admission:   · EKG: No EKG obtained  ** Please Note: This note has been constructed using a voice recognition system   **

## 2022-08-15 NOTE — ASSESSMENT & PLAN NOTE
No results found for: HGBA1C    No results for input(s): POCGLU in the last 72 hours      Blood Sugar Average: Last 72 hrs:     · Check HgbA1c  · Hyperglycemia 374 on admission, given 8U regular insulin in the ED   · PTA regimen: metformin and Trulicity although patient states he has not been using Trulicity because he thought that was a reflux medication   · Inpatient regimen: SSI with accuchecks- adjust as necessary   · Patient states his glucometer broke, he will need new supplies at discharge  · Avoid hypoglycemia

## 2022-08-15 NOTE — ASSESSMENT & PLAN NOTE
· Continue BiPAP qhs as tolerated with pain, patient with right suboccipital cellulitis with abscess

## 2022-08-15 NOTE — ED TRIAGE NOTES
Via WR w/complaint of swelling to back of neck; states wears CPAP that irritates his neck & he has been picking at swollen area; states swelling has increased over past two days; denies fever, nausea and/or fever at home; states swollen area is painful; took tylenol w/some relief

## 2022-08-15 NOTE — ASSESSMENT & PLAN NOTE
· Pseudohyponatremia in setting of hyperglycemia   · Corrected sodium for hyperglycemia 133   · Will continue HCTZ for now due to hypertension, if sodium worsens despite correcting hyperglycemia, consider holding

## 2022-08-15 NOTE — ASSESSMENT & PLAN NOTE
Patient presents with 2 days of right posterior neck pain, erythema, drainage  States that he believes its from his BiPAP straps at home  · CT soft tissue neck w/ contrast: deep and superficial soft tissue cellulitis in the right suboccipital region  Superficial subcutaneous 6mm abscess   · Currently afebrile and without leukocytosis  Hemodynamically stable     · Lactic acid 2 1, repeat in 2h     · NPO at midnight for abscess drainage tomorrow   · IVF   · IV abx: Cefazolin + Vancomycin   · General surgery consult appreciated

## 2022-08-15 NOTE — ASSESSMENT & PLAN NOTE
· BP elevated on admission 171/84  · Continue PTA antihypertensives: doxazosin, hydralazine, Norvasc, HCTZ, losartan, spironolactone, lopressor

## 2022-08-15 NOTE — ASSESSMENT & PLAN NOTE
· Creatinine 1 62 with GFR 49 on admission   · No prior labs available for review, unknown baseline   · IVF    · I/O  · Avoid nephrotoxins/hypotension   · Continue to trend Cr

## 2022-08-15 NOTE — ED PROVIDER NOTES
History  Chief Complaint   Patient presents with    Abscess     [address] w/complaint of swelling to back of neck; states wears CPAP that irritates his neck & he has been picking at swollen area; states swelling has increased over past two days; denies fever, nausea and/or fever at home; states swollen area is painful; took tylenol w/some relief     A 46y obese, diabetic came for having pain and swelling on the back of the neck   Pt stated he has straps of CPAP machine which do friction on the skin and he felt swelling getting bigger daily   Pt denies any fever, and now there is purulent secretion comes out  Pt had this going on for about 2 days, and take not medications for it   Pt has h/o of DM, HTN, Kidney stones, high cholesterol  History provided by:  Patient   used: No    Abscess  Location:  Head/neck  Abscess quality: draining, painful and redness    Red streaking: no    Progression:  Worsening  Pain details:     Quality:  Throbbing    Severity:  Moderate    Timing:  Constant    Progression:  Worsening  Chronicity:  New  Context: diabetes    Relieved by:  Nothing  Worsened by:  Nothing  Ineffective treatments:  None tried  Associated symptoms: anorexia    Associated symptoms: no fatigue, no fever, no headaches, no nausea and no vomiting    Risk factors: no family hx of MRSA, no hx of MRSA and no prior abscess        Prior to Admission Medications   Prescriptions Last Dose Informant Patient Reported? Taking?    ALPRAZolam (XANAX) 0 5 mg tablet   Yes No   Sig: Take 0 5 mg by mouth every 8 (eight) hours as needed   Aspirin Buf,CaCarb-MgCarb-MgO, 81 MG TABS   Yes No   Sig: Take 1 tablet by mouth daily   Cholecalciferol (Vitamin D) 50 MCG (2000 UT) CAPS   Yes No   Sig: Take 1 tablet by mouth daily   Misc Natural Products (OSTEO BI-FLEX/5-LOXIN ADVANCED PO)   Yes No   Sig: Take by mouth   Multiple Vitamin (MULTIVITAMIN ADULT PO)   Yes No   Sig: Take by mouth   Trulicity 8 60 CU/1 2EP SOPN Yes No   Sig: inject 0 5 milliliters ( 0 75 milligrams ) subcutaneously every week   acetaminophen (Tylenol) 325 mg tablet   Yes No   Sig: Take by mouth   allopurinol (ZYLOPRIM) 100 mg tablet   Yes No   amLODIPine (NORVASC) 5 mg tablet   Yes No   doxazosin (CARDURA) 4 mg tablet   Yes No   Sig: Take 4 mg by mouth 2 (two) times a day   hydrALAZINE (APRESOLINE) 50 mg tablet   Yes No   hydrochlorothiazide (HYDRODIURIL) 25 mg tablet   Yes No   losartan (COZAAR) 100 MG tablet   Yes No   metFORMIN (GLUCOPHAGE) 1000 MG tablet   Yes No   metoprolol tartrate (LOPRESSOR) 50 mg tablet   Yes No   naproxen sodium (Aleve) 220 MG tablet   Yes No   Sig: Take by mouth   rosuvastatin (CRESTOR) 10 MG tablet   Yes No   Sig: Take 10 mg by mouth daily   Patient not taking: Reported on 1/11/2022    simvastatin (ZOCOR) 10 mg tablet   Yes No   spironolactone (ALDACTONE) 25 mg tablet   Yes No      Facility-Administered Medications: None       Past Medical History:   Diagnosis Date    Sleep apnea, obstructive        History reviewed  No pertinent surgical history  History reviewed  No pertinent family history  I have reviewed and agree with the history as documented  E-Cigarette/Vaping    E-Cigarette Use Never User      E-Cigarette/Vaping Substances    Nicotine No     THC No     CBD No     Flavoring No     Other No     Unknown No      Social History     Tobacco Use    Smoking status: Never Smoker    Smokeless tobacco: Current User     Types: Chew    Tobacco comment: chewing tobacco occasionally    Vaping Use    Vaping Use: Never used   Substance Use Topics    Alcohol use: Never    Drug use: Never       Review of Systems   Constitutional: Negative for diaphoresis, fatigue and fever  HENT: Negative for congestion, drooling, ear discharge, ear pain, nosebleeds, postnasal drip, rhinorrhea, sinus pressure, sinus pain, sneezing, sore throat and tinnitus  Eyes: Negative for photophobia and visual disturbance     Respiratory: Negative for cough, chest tightness and shortness of breath  Cardiovascular: Negative for chest pain, palpitations and leg swelling  Gastrointestinal: Positive for anorexia  Negative for abdominal pain, diarrhea, nausea and vomiting  Endocrine: Negative for polydipsia, polyphagia and polyuria  Genitourinary: Negative for difficulty urinating, dysuria, flank pain, frequency and hematuria  Musculoskeletal: Positive for neck pain  Negative for arthralgias, back pain, gait problem, joint swelling, myalgias and neck stiffness  Skin: Negative for color change, pallor and rash  Neurological: Negative for dizziness, seizures, syncope, speech difficulty, light-headedness and headaches  Hematological: Negative for adenopathy  Does not bruise/bleed easily  Psychiatric/Behavioral: Negative for agitation and behavioral problems  Physical Exam  Physical Exam  Vitals and nursing note reviewed  Constitutional:       General: He is not in acute distress  Appearance: He is well-developed  He is not ill-appearing, toxic-appearing or diaphoretic  HENT:      Head: Normocephalic and atraumatic  Right Ear: Tympanic membrane and ear canal normal       Left Ear: Tympanic membrane and ear canal normal       Nose: Nose normal  No congestion or rhinorrhea  Mouth/Throat:      Pharynx: No oropharyngeal exudate or posterior oropharyngeal erythema  Eyes:      General: No scleral icterus  Right eye: No discharge  Left eye: No discharge  Extraocular Movements: Extraocular movements intact  Conjunctiva/sclera: Conjunctivae normal       Pupils: Pupils are equal, round, and reactive to light  Neck:      Vascular: No carotid bruit  Comments: Examination back of neck ; has large reddish tender warm fluctuant area about 15x7 cm  Center of it has a point discharging purulent one   No cervical lymphadenopathy  Cardiovascular:      Rate and Rhythm: Normal rate and regular rhythm  Heart sounds: Normal heart sounds  No murmur heard  No friction rub  Pulmonary:      Effort: Pulmonary effort is normal  No respiratory distress  Breath sounds: Normal breath sounds  No wheezing  Chest:      Chest wall: No tenderness  Abdominal:      General: Bowel sounds are normal  There is no distension  Palpations: Abdomen is soft  There is no mass  Tenderness: There is no abdominal tenderness  There is no right CVA tenderness, guarding or rebound  Hernia: No hernia is present  Musculoskeletal:         General: No deformity  Normal range of motion  Cervical back: Normal range of motion and neck supple  Tenderness present  No rigidity  Lymphadenopathy:      Cervical: No cervical adenopathy  Skin:     General: Skin is warm and dry  Capillary Refill: Capillary refill takes less than 2 seconds  Coloration: Skin is not jaundiced or pale  Findings: No bruising, erythema, lesion or rash  Neurological:      Mental Status: He is alert and oriented to person, place, and time     Psychiatric:         Mood and Affect: Mood normal          Behavior: Behavior normal          Vital Signs  ED Triage Vitals [08/15/22 1551]   Temperature Pulse Respirations Blood Pressure SpO2   98 1 °F (36 7 °C) 92 20 (!) 180/97 100 %      Temp Source Heart Rate Source Patient Position - Orthostatic VS BP Location FiO2 (%)   Oral Monitor Sitting Left arm --      Pain Score       8           Vitals:    08/15/22 1551 08/15/22 1809 08/15/22 2001   BP: (!) 180/97 (!) 171/84 160/85   Pulse: 92 90 103   Patient Position - Orthostatic VS: Sitting Sitting Lying         Visual Acuity      ED Medications  Medications   sodium chloride 0 9 % infusion (150 mL/hr Intravenous New Bag 8/15/22 1715)   doxazosin (CARDURA) tablet 4 mg (4 mg Oral Given 8/15/22 2049)   pravastatin (PRAVACHOL) tablet 20 mg (has no administration in time range)   amLODIPine (NORVASC) tablet 5 mg (has no administration in time range)   hydrALAZINE (APRESOLINE) tablet 50 mg (50 mg Oral Given 8/15/22 2053)   hydrochlorothiazide (HYDRODIURIL) tablet 25 mg (has no administration in time range)   losartan (COZAAR) tablet 100 mg (has no administration in time range)   ALPRAZolam (XANAX) tablet 0 5 mg (has no administration in time range)   metoprolol tartrate (LOPRESSOR) tablet 50 mg (50 mg Oral Given 8/15/22 2049)   spironolactone (ALDACTONE) tablet 25 mg (has no administration in time range)   acetaminophen (TYLENOL) tablet 650 mg (650 mg Oral Given 8/15/22 2108)   ondansetron (ZOFRAN) injection 4 mg (has no administration in time range)   insulin lispro (HumaLOG) 100 units/mL subcutaneous injection 2-12 Units (has no administration in time range)   insulin lispro (HumaLOG) 100 units/mL subcutaneous injection 2-12 Units (4 Units Subcutaneous Given 8/15/22 2108)   ceFAZolin (ANCEF) IVPB (premix in dextrose) 2,000 mg 50 mL (2,000 mg Intravenous New Bag 8/15/22 2052)   vancomycin (VANCOCIN) 1,750 mg in sodium chloride 0 9 % 500 mL IVPB (has no administration in time range)   cefTRIAXone (ROCEPHIN) IVPB (premix in dextrose) 1,000 mg 50 mL (0 mg Intravenous Stopped 8/15/22 1758)   vancomycin (VANCOCIN) 1500 mg in sodium chloride 0 9% 250 mL IVPB (1,500 mg Intravenous New Bag 8/15/22 1807)   insulin regular (HumuLIN R,NovoLIN R) injection 8 Units (8 Units Subcutaneous Given 8/15/22 1805)   sodium chloride 0 9 % bolus 1,000 mL (1,000 mL Intravenous New Bag 8/15/22 1808)   iohexol (OMNIPAQUE) 350 MG/ML injection (MULTI-DOSE) 65 mL (65 mL Intravenous Given 8/15/22 1754)   heparin (porcine) subcutaneous injection 5,000 Units (5,000 Units Subcutaneous Given 8/15/22 2052)       Diagnostic Studies  Results Reviewed     Procedure Component Value Units Date/Time    Blood culture #1 [[id number]] Collected: 08/15/22 1707    Lab Status: Preliminary result Specimen: Blood from Arm, Right Updated: 08/15/22 9586     Blood Culture Received in Microbiology Lab  Culture in Progress  Blood culture #2 [[id number]] Collected: 08/15/22 1707    Lab Status: Preliminary result Specimen: Blood from Arm, Left Updated: 08/15/22 2203     Blood Culture Received in Microbiology Lab  Culture in Progress  Lactic acid 2 Hours [[id number]]  (Normal) Collected: 08/15/22 2058    Lab Status: Final result Specimen: Blood from Arm, Left Updated: 08/15/22 2150     LACTIC ACID 1 5 mmol/L     Narrative:      Result may be elevated if tourniquet was used during collection  Hemoglobin A1c w/EAG Estimation (Orders if not completed within the last 90 days) [[id number]] Collected: 08/15/22 1707    Lab Status: In process Specimen: Blood from Arm, Left Updated: 08/15/22 2046    Lactic acid, plasma [[id number]]  (Abnormal) Collected: 08/15/22 1707    Lab Status: Final result Specimen: Blood from Arm, Left Updated: 08/15/22 1740     LACTIC ACID 2 1 mmol/L     Narrative:      Result may be elevated if tourniquet was used during collection      Comprehensive metabolic panel [[id number]]  (Abnormal) Collected: 08/15/22 1707    Lab Status: Final result Specimen: Blood from Arm, Left Updated: 08/15/22 1732     Sodium 129 mmol/L      Potassium 3 7 mmol/L      Chloride 96 mmol/L      CO2 20 mmol/L      ANION GAP 13 mmol/L      BUN 29 mg/dL      Creatinine 1 62 mg/dL      Glucose 374 mg/dL      Calcium 9 8 mg/dL      AST 26 U/L      ALT 32 U/L      Alkaline Phosphatase 66 U/L      Total Protein 8 0 g/dL      Albumin 4 7 g/dL      Total Bilirubin 0 38 mg/dL      eGFR 49 ml/min/1 73sq m     Narrative:      [place] guidelines for Chronic Kidney Disease (CKD):     Stage 1 with normal or high GFR (GFR > 90 mL/min/1 73 square meters)    Stage 2 Mild CKD (GFR = 60-89 mL/min/1 73 square meters)    Stage 3A Moderate CKD (GFR = 45-59 mL/min/1 73 square meters)    Stage 3B Moderate CKD (GFR = 30-44 mL/min/1 73 square meters)    Stage 4 Severe CKD (GFR = 15-29 mL/min/1 73 square meters)    Stage 5 End Stage CKD (GFR <15 mL/min/1 73 square meters)  Note: GFR calculation is accurate only with a steady state creatinine    CBC and differential [[id number]]  (Abnormal) Collected: 08/15/22 1707    Lab Status: Final result Specimen: Blood from Arm, Left Updated: 08/15/22 1717     WBC 7 96 Thousand/uL      RBC 4 30 Million/uL      Hemoglobin 13 2 g/dL      Hematocrit 37 9 %      MCV 88 fL      MCH 30 7 pg      MCHC 34 8 g/dL      RDW 13 2 %      MPV 9 2 fL      Platelets 983 Thousands/uL      nRBC 0 /100 WBCs      Neutrophils Relative 81 %      Immat GRANS % 0 %      Lymphocytes Relative 11 %      Monocytes Relative 7 %      Eosinophils Relative 1 %      Basophils Relative 0 %      Neutrophils Absolute 6 38 Thousands/µL      Immature Grans Absolute 0 03 Thousand/uL      Lymphocytes Absolute 0 91 Thousands/µL      Monocytes Absolute 0 53 Thousand/µL      Eosinophils Absolute 0 08 Thousand/µL      Basophils Absolute 0 03 Thousands/µL     Wound culture and Gram stain [[id number]] Collected: 08/15/22 1707    Lab Status: In process Specimen: Wound from Neck Updated: 08/15/22 1714                 CT soft tissue neck with contrast   Final Result by Niya Elaine MD (08/15 1821)      Deep and superficial soft tissue cellulitis in the right suboccipital region  Superficial subcutaneous 6 mm abscess  Workstation performed: [id number]                    Procedures  Procedures         ED Course                               SBIRT 20yo+    Flowsheet Row Most Recent Value   SBIRT (25 yo +)    In order to provide better care to our patients, we are screening all of our patients for alcohol and drug use  Would it be okay to ask you these screening questions? No Filed at: 08/15/2022 1723                    MDM  Number of Diagnoses or Management Options  Diagnosis management comments: This is 52y old came for haivng pain at the back of the neck , no fever pt is obese diabetic    On exam the back of neck has large area of reddness erythema and tenderness  Ct neck shows cellulitis and abscess  Case discussed with surgeon dr Kamryn Madera and want to admit to [facility] and keep NPO  Pt stated on vanco and rocephanine       Amount and/or Complexity of Data Reviewed  Clinical lab tests: ordered and reviewed  Tests in the radiology section of CPT®: ordered and reviewed    Risk of Complications, Morbidity, and/or Mortality  Presenting problems: moderate  Diagnostic procedures: moderate  Management options: moderate        Disposition  Final diagnoses:   Cellulitis and abscess of neck   Uncontrolled type 2 diabetes mellitus with hyperglycemia ([facility] [address] )     Time reflects when diagnosis was documented in both MDM as applicable and the Disposition within this note     Time User Action Codes Description Comment    8/15/2022  6:38 PM Esther Cruz Add [L03 221,  L02 11] Cellulitis and abscess of neck     8/15/2022  6:40 PM Esther Cruz Add [E11 65] Uncontrolled type 2 diabetes mellitus with hyperglycemia ([facility] [address] )     8/15/2022  7:52 PM Nam Rodriguez [I10] Primary hypertension     8/15/2022  7:52 PM Nam Rosas Add [E66 01] Morbid obesity [facility])       ED Disposition     ED Disposition   Admit    Condition   Stable    Date/Time   Mon Aug 15, 2022  6:39 PM    Comment   Case was discussed with dr Chidi Zazueta and the patient's admission status was agreed to be admitted to med surg to the service of Dr Chidi Zazueta             Follow-up Information    None         Current Discharge Medication List      CONTINUE these medications which have NOT CHANGED    Details   acetaminophen (Tylenol) 325 mg tablet Take by mouth      allopurinol (ZYLOPRIM) 100 mg tablet       ALPRAZolam (XANAX) 0 5 mg tablet Take 0 5 mg by mouth every 8 (eight) hours as needed      amLODIPine (NORVASC) 5 mg tablet       Aspirin Buf,CaCarb-MgCarb-MgO, 81 MG TABS Take 1 tablet by mouth daily      Cholecalciferol (Vitamin D) 50 MCG (2000 UT) CAPS Take 1 tablet by mouth daily      doxazosin (CARDURA) 4 mg tablet Take 4 mg by mouth 2 (two) times a day      hydrALAZINE (APRESOLINE) 50 mg tablet       hydrochlorothiazide (HYDRODIURIL) 25 mg tablet       losartan (COZAAR) 100 MG tablet       metFORMIN (GLUCOPHAGE) 1000 MG tablet       metoprolol tartrate (LOPRESSOR) 50 mg tablet       Misc Natural Products (OSTEO BI-FLEX/5-LOXIN ADVANCED PO) Take by mouth      Multiple Vitamin (MULTIVITAMIN ADULT PO) Take by mouth      naproxen sodium (Aleve) 220 MG tablet Take by mouth      rosuvastatin (CRESTOR) 10 MG tablet Take 10 mg by mouth daily      simvastatin (ZOCOR) 10 mg tablet       spironolactone (ALDACTONE) 25 mg tablet       Trulicity 0 12 BU/0 9DL SOPN inject 0 5 milliliters ( 0 75 milligrams ) subcutaneously every week             No discharge procedures on file      PDMP Review     None          ED Provider  Electronically Signed by           Claudia Urrutia MD  08/15/22 8802

## 2022-08-16 LAB
ANION GAP SERPL CALCULATED.3IONS-SCNC: 13 MMOL/L (ref 4–13)
BASOPHILS # BLD AUTO: 0.04 THOUSANDS/ΜL (ref 0–0.1)
BASOPHILS NFR BLD AUTO: 0 % (ref 0–1)
BUN SERPL-MCNC: 25 MG/DL (ref 5–25)
CALCIUM SERPL-MCNC: 9 MG/DL (ref 8.4–10.2)
CHLORIDE SERPL-SCNC: 101 MMOL/L (ref 96–108)
CO2 SERPL-SCNC: 18 MMOL/L (ref 21–32)
CREAT SERPL-MCNC: 1.51 MG/DL (ref 0.6–1.3)
EOSINOPHIL # BLD AUTO: 0.06 THOUSAND/ΜL (ref 0–0.61)
EOSINOPHIL NFR BLD AUTO: 1 % (ref 0–6)
ERYTHROCYTE [DISTWIDTH] IN BLOOD BY AUTOMATED COUNT: 13.3 % (ref 11.6–15.1)
EST. AVERAGE GLUCOSE BLD GHB EST-MCNC: 243 MG/DL
GFR SERPL CREATININE-BSD FRML MDRD: 53 ML/MIN/1.73SQ M
GLUCOSE SERPL-MCNC: 230 MG/DL (ref 65–140)
GLUCOSE SERPL-MCNC: 235 MG/DL (ref 65–140)
GLUCOSE SERPL-MCNC: 242 MG/DL (ref 65–140)
GLUCOSE SERPL-MCNC: 255 MG/DL (ref 65–140)
GLUCOSE SERPL-MCNC: 299 MG/DL (ref 65–140)
HBA1C MFR BLD: 10.1 %
HCT VFR BLD AUTO: 34.3 % (ref 36.5–49.3)
HGB BLD-MCNC: 12 G/DL (ref 12–17)
IMM GRANULOCYTES # BLD AUTO: 0.03 THOUSAND/UL (ref 0–0.2)
IMM GRANULOCYTES NFR BLD AUTO: 0 % (ref 0–2)
LYMPHOCYTES # BLD AUTO: 1.21 THOUSANDS/ΜL (ref 0.6–4.47)
LYMPHOCYTES NFR BLD AUTO: 13 % (ref 14–44)
MCH RBC QN AUTO: 31.4 PG (ref 26.8–34.3)
MCHC RBC AUTO-ENTMCNC: 35 G/DL (ref 31.4–37.4)
MCV RBC AUTO: 90 FL (ref 82–98)
MONOCYTES # BLD AUTO: 0.81 THOUSAND/ΜL (ref 0.17–1.22)
MONOCYTES NFR BLD AUTO: 9 % (ref 4–12)
NEUTROPHILS # BLD AUTO: 7.28 THOUSANDS/ΜL (ref 1.85–7.62)
NEUTS SEG NFR BLD AUTO: 77 % (ref 43–75)
NRBC BLD AUTO-RTO: 0 /100 WBCS
PLATELET # BLD AUTO: 130 THOUSANDS/UL (ref 149–390)
PMV BLD AUTO: 9.1 FL (ref 8.9–12.7)
POTASSIUM SERPL-SCNC: 3.4 MMOL/L (ref 3.5–5.3)
RBC # BLD AUTO: 3.82 MILLION/UL (ref 3.88–5.62)
SODIUM SERPL-SCNC: 132 MMOL/L (ref 135–147)
WBC # BLD AUTO: 9.43 THOUSAND/UL (ref 4.31–10.16)

## 2022-08-16 PROCEDURE — 99223 1ST HOSP IP/OBS HIGH 75: CPT | Performed by: INTERNAL MEDICINE

## 2022-08-16 PROCEDURE — 87186 SC STD MICRODIL/AGAR DIL: CPT | Performed by: INTERNAL MEDICINE

## 2022-08-16 PROCEDURE — 82948 REAGENT STRIP/BLOOD GLUCOSE: CPT

## 2022-08-16 PROCEDURE — 99232 SBSQ HOSP IP/OBS MODERATE 35: CPT | Performed by: INTERNAL MEDICINE

## 2022-08-16 PROCEDURE — 85025 COMPLETE CBC W/AUTO DIFF WBC: CPT | Performed by: PHYSICIAN ASSISTANT

## 2022-08-16 PROCEDURE — 80048 BASIC METABOLIC PNL TOTAL CA: CPT | Performed by: PHYSICIAN ASSISTANT

## 2022-08-16 PROCEDURE — 87070 CULTURE OTHR SPECIMN AEROBIC: CPT | Performed by: INTERNAL MEDICINE

## 2022-08-16 PROCEDURE — 99221 1ST HOSP IP/OBS SF/LOW 40: CPT | Performed by: SURGERY

## 2022-08-16 PROCEDURE — 87205 SMEAR GRAM STAIN: CPT | Performed by: INTERNAL MEDICINE

## 2022-08-16 RX ORDER — HEPARIN SODIUM 5000 [USP'U]/ML
5000 INJECTION, SOLUTION INTRAVENOUS; SUBCUTANEOUS EVERY 8 HOURS SCHEDULED
Status: DISCONTINUED | OUTPATIENT
Start: 2022-08-16 | End: 2022-08-18 | Stop reason: HOSPADM

## 2022-08-16 RX ORDER — POTASSIUM CHLORIDE 20 MEQ/1
40 TABLET, EXTENDED RELEASE ORAL ONCE
Status: COMPLETED | OUTPATIENT
Start: 2022-08-16 | End: 2022-08-16

## 2022-08-16 RX ORDER — SODIUM CHLORIDE, SODIUM LACTATE, POTASSIUM CHLORIDE, CALCIUM CHLORIDE 600; 310; 30; 20 MG/100ML; MG/100ML; MG/100ML; MG/100ML
75 INJECTION, SOLUTION INTRAVENOUS CONTINUOUS
Status: DISCONTINUED | OUTPATIENT
Start: 2022-08-16 | End: 2022-08-17

## 2022-08-16 RX ADMIN — METOPROLOL TARTRATE 50 MG: 50 TABLET, FILM COATED ORAL at 20:49

## 2022-08-16 RX ADMIN — SPIRONOLACTONE 25 MG: 25 TABLET ORAL at 08:31

## 2022-08-16 RX ADMIN — LOSARTAN POTASSIUM 100 MG: 50 TABLET, FILM COATED ORAL at 08:31

## 2022-08-16 RX ADMIN — VANCOMYCIN HYDROCHLORIDE 1750 MG: 1 INJECTION, POWDER, LYOPHILIZED, FOR SOLUTION INTRAVENOUS at 06:07

## 2022-08-16 RX ADMIN — HYDRALAZINE HYDROCHLORIDE 50 MG: 25 TABLET, FILM COATED ORAL at 20:49

## 2022-08-16 RX ADMIN — METOPROLOL TARTRATE 50 MG: 50 TABLET, FILM COATED ORAL at 08:31

## 2022-08-16 RX ADMIN — AMLODIPINE BESYLATE 5 MG: 5 TABLET ORAL at 08:31

## 2022-08-16 RX ADMIN — ACETAMINOPHEN 650 MG: 325 TABLET, FILM COATED ORAL at 17:57

## 2022-08-16 RX ADMIN — CEFAZOLIN SODIUM 2000 MG: 2 SOLUTION INTRAVENOUS at 03:47

## 2022-08-16 RX ADMIN — CEFAZOLIN SODIUM 2000 MG: 2 SOLUTION INTRAVENOUS at 12:05

## 2022-08-16 RX ADMIN — HYDRALAZINE HYDROCHLORIDE 50 MG: 25 TABLET, FILM COATED ORAL at 08:31

## 2022-08-16 RX ADMIN — INSULIN LISPRO 6 UNITS: 100 INJECTION, SOLUTION INTRAVENOUS; SUBCUTANEOUS at 17:55

## 2022-08-16 RX ADMIN — INSULIN LISPRO 6 UNITS: 100 INJECTION, SOLUTION INTRAVENOUS; SUBCUTANEOUS at 21:58

## 2022-08-16 RX ADMIN — ACETAMINOPHEN 650 MG: 325 TABLET, FILM COATED ORAL at 03:46

## 2022-08-16 RX ADMIN — INSULIN LISPRO 4 UNITS: 100 INJECTION, SOLUTION INTRAVENOUS; SUBCUTANEOUS at 12:03

## 2022-08-16 RX ADMIN — VANCOMYCIN HYDROCHLORIDE 1750 MG: 1 INJECTION, POWDER, LYOPHILIZED, FOR SOLUTION INTRAVENOUS at 18:02

## 2022-08-16 RX ADMIN — SODIUM CHLORIDE, POTASSIUM CHLORIDE, SODIUM LACTATE AND CALCIUM CHLORIDE 75 ML/HR: 600; 310; 30; 20 INJECTION, SOLUTION INTRAVENOUS at 12:04

## 2022-08-16 RX ADMIN — INSULIN LISPRO 4 UNITS: 100 INJECTION, SOLUTION INTRAVENOUS; SUBCUTANEOUS at 08:29

## 2022-08-16 RX ADMIN — HEPARIN SODIUM 5000 UNITS: 5000 INJECTION INTRAVENOUS; SUBCUTANEOUS at 21:58

## 2022-08-16 RX ADMIN — PRAVASTATIN SODIUM 20 MG: 20 TABLET ORAL at 17:57

## 2022-08-16 RX ADMIN — DOXAZOSIN 4 MG: 4 TABLET ORAL at 08:31

## 2022-08-16 RX ADMIN — HYDROCHLOROTHIAZIDE 25 MG: 25 TABLET ORAL at 08:31

## 2022-08-16 RX ADMIN — DOXAZOSIN 4 MG: 4 TABLET ORAL at 17:57

## 2022-08-16 RX ADMIN — HEPARIN SODIUM 5000 UNITS: 5000 INJECTION INTRAVENOUS; SUBCUTANEOUS at 17:57

## 2022-08-16 RX ADMIN — ACETAMINOPHEN 650 MG: 325 TABLET, FILM COATED ORAL at 23:37

## 2022-08-16 RX ADMIN — POTASSIUM CHLORIDE 40 MEQ: 1500 TABLET, EXTENDED RELEASE ORAL at 17:57

## 2022-08-16 NOTE — PLAN OF CARE
Problem: Potential for Falls  Goal: Patient will remain free of falls  Description: INTERVENTIONS:  - Educate patient/family on patient safety including physical limitations  - Instruct patient to call for assistance with activity   - Consult OT/PT to assist with strengthening/mobility   - Keep Call bell within reach  - Keep bed low and locked with side rails adjusted as appropriate  - Keep care items and personal belongings within reach  - Initiate and maintain comfort rounds  - Make Fall Risk Sign visible to staff  - Offer Toileting every 2 Hours, in advance of need  - Initiate/Maintain bed alarm  - Obtain necessary fall risk management equipment:   - Apply yellow socks and bracelet for high fall risk patients  - Consider moving patient to room near nurses station  Outcome: Progressing     Problem: PAIN - ADULT  Goal: Verbalizes/displays adequate comfort level or baseline comfort level  Description: Interventions:  - Encourage patient to monitor pain and request assistance  - Assess pain using appropriate pain scale  - Administer analgesics based on type and severity of pain and evaluate response  - Implement non-pharmacological measures as appropriate and evaluate response  - Consider cultural and social influences on pain and pain management  - Notify physician/advanced practitioner if interventions unsuccessful or patient reports new pain  Outcome: Progressing     Problem: INFECTION - ADULT  Goal: Absence or prevention of progression during hospitalization  Description: INTERVENTIONS:  - Assess and monitor for signs and symptoms of infection  - Monitor lab/diagnostic results  - Monitor all insertion sites, i e  indwelling lines, tubes, and drains  - Monitor endotracheal if appropriate and nasal secretions for changes in amount and color  - [place] appropriate cooling/warming therapies per order  - Administer medications as ordered  - Instruct and encourage patient and family to use good hand hygiene technique  - Identify and instruct in appropriate isolation precautions for identified infection/condition  Outcome: Progressing  Goal: Absence of fever/infection during neutropenic period  Description: INTERVENTIONS:  - Monitor WBC    Outcome: Progressing     Problem: SAFETY ADULT  Goal: Patient will remain free of falls  Description: INTERVENTIONS:  - Educate patient/family on patient safety including physical limitations  - Instruct patient to call for assistance with activity   - Consult OT/PT to assist with strengthening/mobility   - Keep Call bell within reach  - Keep bed low and locked with side rails adjusted as appropriate  - Keep care items and personal belongings within reach  - Initiate and maintain comfort rounds  - Make Fall Risk Sign visible to staff  - Offer Toileting every 2 Hours, in advance of need  - Initiate/Maintain bed alarm  - Obtain necessary fall risk management equipment:   - Apply yellow socks and bracelet for high fall risk patients  - Consider moving patient to room near nurses station  Outcome: Progressing  Goal: Maintain or return to baseline ADL function  Description: INTERVENTIONS:  -  Assess patient's ability to carry out ADLs; assess patient's baseline for ADL function and identify physical deficits which impact ability to perform ADLs (bathing, care of mouth/teeth, toileting, grooming, dressing, etc )  - Assess/evaluate cause of self-care deficits   - Assess range of motion  - Assess patient's mobility; develop plan if impaired  - Assess patient's need for assistive devices and provide as appropriate  - Encourage maximum independence but intervene and supervise when necessary  - Involve family in performance of ADLs  - Assess for home care needs following discharge   - Consider OT consult to assist with ADL evaluation and planning for discharge  - Provide patient education as appropriate  Outcome: Progressing  Goal: Maintains/Returns to pre admission functional level  Description: INTERVENTIONS:  - Perform BMAT or MOVE assessment daily    - Set and communicate daily mobility goal to care team and patient/family/caregiver  - Collaborate with rehabilitation services on mobility goals if consulted  - Perform Range of Motion 3 times a day  - Reposition patient every 2 hours  - Dangle patient 3 times a day  - Stand patient 3 times a day  - Ambulate patient 3 times a day  - Out of bed to chair 3 times a day   - Out of bed for meals 3 times a day  - Out of bed for toileting  - Record patient progress and toleration of activity level   Outcome: Progressing     Problem: DISCHARGE PLANNING  Goal: Discharge to home or other facility with appropriate resources  Description: INTERVENTIONS:  - Identify barriers to discharge w/patient and caregiver  - Arrange for needed discharge resources and transportation as appropriate  - Identify discharge learning needs (meds, wound care, etc )  - Arrange for interpretive services to assist at discharge as needed  - Refer to Case Management Department for coordinating discharge planning if the patient needs post-hospital services based on physician/advanced practitioner order or complex needs related to functional status, cognitive ability, or social support system  Outcome: Progressing     Problem: Knowledge Deficit  Goal: Patient/family/caregiver demonstrates understanding of disease process, treatment plan, medications, and discharge instructions  Description: Complete learning assessment and assess knowledge base    Interventions:  - Provide teaching at level of understanding  - Provide teaching via preferred learning methods  Outcome: Progressing

## 2022-08-16 NOTE — UTILIZATION REVIEW
Initial Clinical Review    Admission: Date/Time/Statement:   Admission Orders (From admission, onward)     Ordered        08/15/22 [address]  Once                   Orders Placed This Encounter   Procedures    INPATIENT ADMISSION     Standing Status:   Standing     Number of Occurrences:   1     Order Specific Question:   Level of Care     Answer:   Med Surg [16]     Order Specific Question:   Estimated length of stay     Answer:   More than 2 Midnights     Order Specific Question:   Certification     Answer:   I certify that inpatient services are medically necessary for this patient for a duration of greater than two midnights  See H&P and MD Progress Notes for additional information about the patient's course of treatment  ED Arrival Information     Expected   -    Arrival   8/15/2022 15:38    Acuity   Urgent            Means of arrival   Walk-In    Escorted by   Self    Service   Hospitalist    Admission type   Urgent            Arrival complaint   Abscess on Neck        Chief Complaint   Patient presents with    Abscess     [facility] [place] w/complaint of swelling to back of neck; states wears CPAP that irritates his neck & he has been picking at swollen area; states swelling has increased over past two days; denies fever, nausea and/or fever at home; states swollen area is painful; took tylenol w/some relief     Initial Presentation:   47 y/o male with PMHx morbid obesity, HTN, HLD, SANJIV on BiPAP qhs, DM - presents urgently to [facility] ED on 8/15/22 2nd 2 day history of right posterior neck pain, erythema + drainage   Patient reports symptoms started due to irritation from his BiPAP straps which he uses at night for his SANJIV  He was not checking his sugars at home because his glucometer broke and  Was not been taking his prescribed Trulicity because he thought it was a PRN med for GERD  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    In ED - HR 92  /97   Exam:  Right posterior neck with fluctuant abscess and  surrounding erythema, swelling, and tenderness with purulent discharge  CT neck shows deep and superficial soft tissue cellulitis in the right suboccipital region  Superficial subcutaneous 6 mm abscess  Labs:  CBC wnl    Na 129  K+3 7   BUN/CR  29/ 1 62   Lact acid 2 1  Wound culture 2+ Growth of Staphylococcus aureus*   ED Tx: IVF NSS,  IV Vancomycin, IV Rocephin, Insulin 8 Units sq  Admitted Inpatient 8/15/22 at 1840 2nd Neck Cellulitis + Abscess - continue IVF, IV ABx x 2, General Surgery Consult - possible I+D in OR     8/16/22 I+D CONSULT:  Staph aureus abscess posterior neck with associated cellulitis  Blood cultures negative to date  Wound culture 2+ Staph aureus   -continue IV vancomycin and IV cefazolin at present   -Pharmacy on consult for vancomycin trough dosing management at present  -follow-up cultures and adjust antibiotics tailored toward Staph aureus sensitivities  -monitor temperature and hemodynamics  -warm compresses  -surgery consulted  -serial exam  -recheck CBC and BMP in a m       ED Triage Vitals [08/15/22 1551]   Temperature Pulse Respirations Blood Pressure SpO2   98 1 °F (36 7 °C) 92 20 (!) 180/97 100 %      Temp Source Heart Rate Source Patient Position - Orthostatic VS BP Location FiO2 (%)   Oral Monitor Sitting Left arm --      Pain Score       8          Wt Readings from Last 1 Encounters:   08/16/22 (!) 151 kg (332 lb 14 3 oz)     Additional Vital Signs:  Date/Time Temp Pulse Resp BP MAP (mmHg) SpO2 O2 Device Patient Position - Orthostatic VS   08/16/22 0836 -- -- -- -- -- -- None (Room air) --   08/16/22 0757 99 9 °F (37 7 °C) 77 20 157/87 -- 96 % -- Sitting   08/15/22 2001 98 2 °F (36 8 °C) 103 16 160/85 -- 98 % -- Lying   08/15/22 1809 -- 90 18 171/84 Abnormal  -- 100 % None (Room air) Sitting   08/15/22 1551 98 1 °F (36 7 °C) 92 20 180/97 Abnormal  116 100 % None (Room air) Sitting      08/15 0701   08/16 0700 08/16 0701   - I V  (mL/kg)  798 7 (5 3)   IV Piggyback 50    Total Intake(mL/kg) 50 (0 3) 798 7 (5 3)   Net +50 +798 7     Pertinent Labs/Diagnostic Test Results:   CT soft tissue neck with contrast   Deep and superficial soft tissue cellulitis in the right suboccipital region  Superficial subcutaneous 6 mm abscess  Results from last 7 days   Lab Units 08/16/22  0613 08/15/22  1707   WBC Thousand/uL 9 43 7 96   HEMOGLOBIN g/dL 12 0 13 2   HEMATOCRIT % 34 3* 37 9   PLATELETS Thousands/uL 130* 144*   NEUTROS ABS Thousands/µL 7 28 6 38     Results from last 7 days   Lab Units 08/16/22  0613 08/15/22  1707   SODIUM mmol/L 132* 129*   POTASSIUM mmol/L 3 4* 3 7   CHLORIDE mmol/L 101 96   CO2 mmol/L 18* 20*   ANION GAP mmol/L 13 13   BUN mg/dL 25 29*   CREATININE mg/dL 1 51* 1 62*   EGFR ml/min/1 73sq m 53 49   CALCIUM mg/dL 9 0 9 8     Results from last 7 days   Lab Units 08/15/22  1707   AST U/L 26   ALT U/L 32   ALK PHOS U/L 66   TOTAL PROTEIN g/dL 8 0   ALBUMIN g/dL 4 7   TOTAL BILIRUBIN mg/dL 0 38     Results from last 7 days   Lab Units 08/16/22  1109 08/16/22  0645 08/15/22  2008   POC GLUCOSE mg/dl 242* 230* 214*     Results from last 7 days   Lab Units 08/16/22  0613 08/15/22  1707   GLUCOSE RANDOM mg/dL 235* 374*     Results from last 7 days   Lab Units 08/15/22  1707   HEMOGLOBIN A1C % 10 1*   EAG mg/dl 243     Results from last 7 days   Lab Units 08/15/22  2058 08/15/22  1707   LACTIC ACID mmol/L 1 5 2 1*     Results from last 7 days   Lab Units 08/15/22  1707   BLOOD CULTURE  Received in Microbiology Lab  Culture in Progress  Received in Microbiology Lab  Culture in Progress     GRAM STAIN RESULT  No Polys*  1+ Gram positive cocci in pairs*   WOUND CULTURE  2+ Growth of Staphylococcus aureus*     ED Treatment:   Medication Administration from 08/15/2022 1538 to 08/15/2022 1909       Date/Time Order Dose Route Action     08/15/2022 1715 sodium chloride 0 9 % infusion 150 mL/hr Intravenous New Bag     08/15/2022 1717 cefTRIAXone (ROCEPHIN) IVPB (premix in dextrose) 1,000 mg 50 mL 1,000 mg Intravenous New Bag     08/15/2022 1807 vancomycin (VANCOCIN) 1500 mg in sodium chloride 0 9% 250 mL IVPB 1,500 mg Intravenous New Bag     08/15/2022 1805 insulin regular (HumuLIN R,NovoLIN R) injection 8 Units 8 Units Subcutaneous Given     08/15/2022 1808 sodium chloride 0 9 % bolus 1,000 mL 1,000 mL Intravenous New Bag     08/15/2022 1754 iohexol (OMNIPAQUE) 350 MG/ML injection (MULTI-DOSE) 65 mL 65 mL Intravenous Given     Past Medical History:   Diagnosis Date    Sleep apnea, obstructive      Present on Admission:   Hypertension   SANJIV (obstructive sleep apnea)   Morbid obesity (HCC)    Admitting Diagnosis: Cellulitis and abscess of neck [L03 221, L02 11]  Abscess [L02 91]  Uncontrolled type 2 diabetes mellitus with hyperglycemia ([facility] [address] ) [E11 65]    Age/Sex: 48 y o  male    Admission Orders:  VS q4hrs  BiPAP with 40 % FiO2 at BT  Continuous Pulse Oximetry  SCD  Up with assistance  NPO - (Diet Bairon/CHO Controlled; Consistent Carbohydrate Diet Level 3 (6 carb servings/90 grams CHO/meal)  BBG qid before meals + BT  I+O q shift  Daily weight    Scheduled Medications:  amLODIPine, 5 mg, Oral, Daily  IV cefazolin, 2,000 mg, Intravenous, Q8H  doxazosin, 4 mg, Oral, BID  hydrALAZINE, 50 mg, Oral, Q12H  hydrochlorothiazide, 25 mg, Oral, Daily  insulin lispro, 2-12 Units, Subcutaneous, TID AC  insulin lispro, 2-12 Units, Subcutaneous, HS  losartan, 100 mg, Oral, Daily  metoprolol tartrate, 50 mg, Oral, Q12H LUTHER  pravastatin, 20 mg, Oral, Daily With Dinner  spironolactone, 25 mg, Oral, Daily  IV vancomycin, 17 5 mg/kg (Adjusted), Intravenous, Q12H    Continuous IV Infusions:  IVF lactated ringers, 75 mL/hr, Intravenous, Continuous    PRN Meds:  acetaminophen, 650 mg, Oral, Q6H PRN - 8/15 X 1, 8/16 X 1  ALPRAZolam, 0 5 mg, Oral, Q8H PRN  ondansetron, 4 mg, Intravenous, Q6H PRN    IP CONSULT TO ACUTE CARE SURGERY  IP CONSULT TO NUTRITION SERVICES  IP CONSULT TO PHARMACY  IP CONSULT TO INFECTIOUS DISEASES    Network Utilization Review Department  ATTENTION: Please call with any questions or concerns to [phone] and carefully listen to the prompts so that you are directed to the right person  All voicemails are confidential   Sue Willis all requests for admission clinical reviews, approved or denied determinations and any other requests to dedicated fax number below belonging to the campus where the patient is receiving treatment   List of dedicated fax numbers for the Facilities:  [address] DENIALS (Administrative/Medical Necessity) [phone]   [address] (Maternity/NICU/Pediatrics) [phone]   [address] [place] [address]  [address] [facility] [place] [address] [address] [place] [address] [address] [facility] [place] [phone]

## 2022-08-16 NOTE — QUICK NOTE
Patient examined with the PA today  He has got quite a bit of thickening in his posterior neck and I think that this is starting to coalesce into an abscess    I discussed it with him and the plan is to take him to the operating room tomorrow for incision and drainage

## 2022-08-16 NOTE — PROGRESS NOTES
Vancomycin Assessment    Adriel Roca is a 48 y o  male who is currently receiving vancomycin   for       Relevant clinical data and objective history reviewed:  Creatinine   Date Value Ref Range Status   08/15/2022 1 62 (H) 0 60 - 1 30 mg/dL Final     Comment:     Standardized to IDMS reference method     /85 (BP Location: Right arm)   Pulse 103   Temp 98 2 °F (36 8 °C) (Tympanic)   Resp 16   Ht 5' 9" (1 753 m)   Wt (!) 149 kg (329 lb 9 4 oz)   SpO2 98%   BMI 48 67 kg/m²   I/O last 3 completed shifts: In: 48 [IV Piggyback:50]  Out: -   Lab Results   Component Value Date/Time    BUN 29 (H) 08/15/2022 05:07 PM    WBC 7 96 08/15/2022 05:07 PM    HGB 13 2 08/15/2022 05:07 PM    HCT 37 9 08/15/2022 05:07 PM    MCV 88 08/15/2022 05:07 PM     (L) 08/15/2022 05:07 PM     Temp Readings from Last 3 Encounters:   08/15/22 98 2 °F (36 8 °C) (Tympanic)   04/12/22 98 4 °F (36 9 °C) (Tympanic)   01/11/22 (!) 97 2 °F (36 2 °C) (Tympanic)     Vancomycin Days of Therapy: 1    Assessment/Plan  The patient is currently on vancomycin utilizing scheduled dosing  The patient is receiving 1750mg q12h  based on a goal of  15-20; therefore, is clinically appropriate and dose will be continued   Pharmacy will continue to follow closely for s/sx of nephrotoxicity, infusion reactions, and appropriateness of therapy  BMP and CBC will be ordered per protocol  Plan for trough as patient approaches steady state, prior to the 4th  dose at approximately 08/17 0530  Pharmacy will continue to follow the patients culture results and clinical progress daily      Gregg Carreno, Pharmacist

## 2022-08-16 NOTE — PROGRESS NOTES
[facility] [address]  Progress Note Doc Marissa Fuentes 1975, 48 y o  male MRN: [id number]  Unit/Bed#: -[place] Encounter: [id number]  Primary Care Provider: Priyanka Schaffer MD   Date and time admitted to hospital: 8/15/2022  3:54 PM    Type 2 diabetes mellitus [facility])  Assessment & Plan  Lab Results   Component Value Date    HGBA1C 10 1 (H) 08/15/2022       Recent Labs     08/15/22  2008 08/16/22  0645 08/16/22  1109 08/16/22  1550   POCGLU 214* 230* 242* 299*       Blood Sugar Average: Last 72 hrs:  (P) 246 25   · Check HgbA1c  · Hyperglycemia 374 on admission, given 8U regular insulin in the ED   · PTA regimen: metformin and Trulicity although patient states he has not been using Trulicity because he thought that was a reflux medication   · Inpatient regimen: SSI with accuchecks- adjust as necessary   · Patient states his glucometer broke, he will need new supplies at discharge  · Avoid hypoglycemia     Elevated serum creatinine  Assessment & Plan  · Creatinine 1 62 with GFR 49 on admission   · No prior labs available for review, unknown baseline   · IVF    · I/O  · Avoid nephrotoxins/hypotension   · Continue to trend Cr     Hyponatremia  Assessment & Plan  · Pseudohyponatremia in setting of hyperglycemia   · Corrected sodium for hyperglycemia 133   · Will continue HCTZ for now due to hypertension, if sodium worsens despite correcting hyperglycemia, consider holding    Hyperlipidemia  Assessment & Plan  Continue statin     SANJIV (obstructive sleep apnea)  Assessment & Plan  · Continue BiPAP qhs as tolerated with pain, patient with right suboccipital cellulitis with abscess    Hypertension  Assessment & Plan  · BP elevated on admission 171/84  · Continue PTA antihypertensives: doxazosin, hydralazine, Norvasc, HCTZ, losartan, spironolactone, lopressor     Morbid obesity (HCC)  Assessment & Plan  · BMI 48 73  · Encourage lifestyle modification/weight loss   · Nutrition consult     * Cellulitis and abscess of neck  Assessment & Plan  Patient presents with 2 days of right posterior neck pain, erythema, drainage  States that he believes its from his BiPAP straps at home  · CT soft tissue neck w/ contrast: deep and superficial soft tissue cellulitis in the right suboccipital region  Superficial subcutaneous 6mm abscess   · Currently afebrile and without leukocytosis  Hemodynamically stable  · Lactic acid 2 1, repeat in 2h     · NPO at midnight for abscess drainage tomorrow   · IVF   · IV abx: Cefazolin + Vancomycin   · General surgery consult appreciated  · Keep NPO for possible drainage in a m  Subjective/Objective     Subjective:   Patient is feeling better  Complains of pain in the back of the neck  No acute overnight events noted  Patient is afebrile  Objective:  Vitals: Blood pressure 141/76, pulse 83, temperature 99 1 °F (37 3 °C), temperature source Tympanic, resp  rate 18, height 5' 9" (1 753 m), weight (!) 151 kg (332 lb 14 3 oz), SpO2 95 %  ,Body mass index is 49 16 kg/m²  Intake/Output Summary (Last 24 hours) at 8/16/2022 1724  Last data filed at 8/16/2022 0751  Gross per 24 hour   Intake 848 7 ml   Output --   Net 848 7 ml       Invasive Devices  Report    Peripheral Intravenous Line  Duration           Peripheral IV 08/15/22 Left Antecubital 1 day    Peripheral IV 08/15/22 Left;Ventral (anterior) Forearm 1 day                Physical Exam:  HEENT-PERRLA, moist oral mucosa  Neck-supple, no JVD elevation , rash with pustules noted draining pus on the back of the neck  Respiratory-equal air entry bilaterally, no rales or rhonchi  Cardiovascular system-S1, S2 heard, no murmur or gallops or rubs  Abdomen-soft, nontender, no guarding or rigidity, bowel sounds heard  Extremities-no pedal edema  Peripheral pulses palpable  Musculoskeletal-no contractures  Central nervous system-no acute focal neurological deficit ,no sensory or motor deficit noted    Skin-no rash noted        Lab, Imaging and other studies: I have personally reviewed pertinent reports      VTE Pharmacologic Prophylaxis: Heparin  VTE Mechanical Prophylaxis: sequential compression device

## 2022-08-16 NOTE — ASSESSMENT & PLAN NOTE
Patient presents with 2 days of right posterior neck pain, erythema, drainage  States that he believes its from his BiPAP straps at home  · CT soft tissue neck w/ contrast: deep and superficial soft tissue cellulitis in the right suboccipital region  Superficial subcutaneous 6mm abscess   · Currently afebrile and without leukocytosis  Hemodynamically stable  · Lactic acid 2 1, repeat in 2h     · NPO at midnight for abscess drainage tomorrow   · IVF   · IV abx: Cefazolin + Vancomycin   · General surgery consult appreciated  · Keep NPO for possible drainage in a m

## 2022-08-16 NOTE — CONSULTS
Consultation - Infectious Disease   Jagdish Fuentes 48 y o  male MRN: [id number]  Unit/Bed#: -01 Encounter: [id number]      IMPRESSION & RECOMMENDATIONS:   1   Staph aureus abscess posterior neck with associated cellulitis  Blood cultures negative to date  Wound cx 2+ Staph aureus   -continues vancomycin and cefazolin at present   -Pharmacy on consult for vancomycin trough dosing management at present  -follow-up cultures and adjust antibiotics tailored toward Staph aureus sensitivities  -monitor temperature and hemodynamics  -warm compresses  -surgery consulted  -serial exam  -recheck CBC and BMP in a m      2  Type 2 diabetes with hyperglycemia  Risk factor for infection   -antibiotics/workup as above  -blood glucose management per primary care team  -serial exam  -close surgical follow-up  -recheck CBC and BMP      3  Renal insufficiency, unclear acute versus chronic  Admission Creatinine 1 62 > 1 51  Unknown baseline  -renal dose adjust antibiotic as needed  -volume management   -recheck BMP     4  Morbid Obesity  BMI 49K  Risk factor for infection and impaired wound healing   -dietary lifestyle modifications for primary care team    Antibiotics:  Vancomycin/Cefazolin D2    I have discussed the above management plan in detail with patient, RN, and the primary service    Extensive review of the medical records in epic including review of the notes, radiographs, and laboratory results     HISTORY OF PRESENT ILLNESS:  Reason for Consult: Cellulitis and abscess of neck    HPI: Nereyda Mccray is a 48y o  year old male with morbid obesity, HTN, HLD, T2DM, SANJIV on BiPAP qhs who presented to the ER 8/15/22 with right posterior neck pain, erythema, drainage x 2 days  He denies any fever/chills at home  He believes that this all started due to irritation from his BiPAP straps which he uses at night for his SANJIV  In the ER, patient was afebrile without leukocytosis    Creatinine was elevated 1 62, lactic acid 2 1 > 1 5  CT scan soft tissue neck showed 6 mm superficial subcutaneous fluid collection adjacent to superficial and deep subcutaneous inflammation of the right suboccipital region  Mildly enlarged lymph nodes likely reactive  Drainage is growing Staph aureus  Patient is admitted on vancomycin and cefazolin currently  Infectious Disease now being consulted regarding evaluation and management of cellulitis and abscess of neck  Patient denies fever, chills at home, N/V/D, dysuria, chest pain, cough  No prior similar eruption, no known staph infection previously  No endovascular devices  REVIEW OF SYSTEMS:  A complete review of systems is negative other than that noted in the HPI  PAST MEDICAL HISTORY:  Past Medical History:   Diagnosis Date    Sleep apnea, obstructive      History reviewed  No pertinent surgical history  FAMILY HISTORY:  Non-contributory    SOCIAL HISTORY:  Social History   Social History     Substance and Sexual Activity   Alcohol Use Never     Social History     Substance and Sexual Activity   Drug Use Never     Social History     Tobacco Use   Smoking Status Never Smoker   Smokeless Tobacco Current User    Types: Chew   Tobacco Comment    chewing tobacco occasionally        ALLERGIES:  No Known Allergies    MEDICATIONS:  All current active medications have been reviewed      PHYSICAL EXAM:  Temp:  [98 1 °F (36 7 °C)-99 9 °F (37 7 °C)] 99 9 °F (37 7 °C)  HR:  [] 77  Resp:  [16-20] 20  BP: (157-180)/(84-97) 157/87  SpO2:  [96 %-100 %] 96 %  Temp (24hrs), Av 7 °F (37 1 °C), Min:98 1 °F (36 7 °C), Max:99 9 °F (37 7 °C)  Current: Temperature: 99 9 °F (37 7 °C)    Intake/Output Summary (Last 24 hours) at 2022 1255  Last data filed at 2022 0751  Gross per 24 hour   Intake 848 7 ml   Output --   Net 848 7 ml       General Appearance:  48year old male, propped in bed, appearing well, nontoxic, slow to turn with posterior neck discomfort, but in no distress   Head: Normocephalic, without obvious abnormality, atraumatic   Eyes:  Conjunctiva pink and sclera anicteric, both eyes   Nose: Nares normal, mucosa normal, no drainage   Throat: Oropharynx moist without lesions   Neck: Supple, symmetrical, posterior neck erythematous induration surrounding central eruption with bloody, purulent oozing and tenderness   Back:   Symmetric, no curvature, ROM normal, no CVA tenderness   Lungs:   Clear to auscultation bilaterally, respirations unlabored   Chest Wall:  No tenderness or deformity   Heart:  RRR; no murmur, rub or gallop   Abdomen:   Soft, non-tender, protuberant pannus, positive bowel sounds    Extremities: No cyanosis, clubbing or edema   Skin: No rashes  Left arm IV site nontender  Lymph nodes: Cervical, supraclavicular nodes normal   Neurologic: Alert and oriented times 3, extremity strength 5/5 and symmetric       LABS, IMAGING, & OTHER STUDIES:  Lab Results:  I have personally reviewed pertinent labs  Results from last 7 days   Lab Units 08/16/22  0613 08/15/22  1707   WBC Thousand/uL 9 43 7 96   HEMOGLOBIN g/dL 12 0 13 2   PLATELETS Thousands/uL 130* 144*     Results from last 7 days   Lab Units 08/16/22  0613 08/15/22  1707   SODIUM mmol/L 132* 129*   POTASSIUM mmol/L 3 4* 3 7   CHLORIDE mmol/L 101 96   CO2 mmol/L 18* 20*   BUN mg/dL 25 29*   CREATININE mg/dL 1 51* 1 62*   EGFR ml/min/1 73sq m 53 49   CALCIUM mg/dL 9 0 9 8   AST U/L  --  26   ALT U/L  --  32   ALK PHOS U/L  --  66     Results from last 7 days   Lab Units 08/15/22  1707   BLOOD CULTURE  Received in Microbiology Lab  Culture in Progress  Received in Microbiology Lab  Culture in Progress  GRAM STAIN RESULT  No Polys*  1+ Gram positive cocci in pairs*   WOUND CULTURE  2+ Growth of Staphylococcus aureus*                       Imaging Studies:   Imaging reviewed personally    08/15/2022 CT soft tissue neck:  6 mm superficial subcutaneous fluid collection adjacent to superficial and deep subcutaneous inflammation of the right suboccipital region  Mildly enlarged lymph nodes likely reactive    Other Studies:   I have personally reviewed pertinent reports

## 2022-08-16 NOTE — ASSESSMENT & PLAN NOTE
Lab Results   Component Value Date    HGBA1C 10 1 (H) 08/15/2022       Recent Labs     08/15/22  2008 08/16/22  0645 08/16/22  1109 08/16/22  1550   POCGLU 214* 230* 242* 299*       Blood Sugar Average: Last 72 hrs:  (P) 246 25   · Check HgbA1c  · Hyperglycemia 374 on admission, given 8U regular insulin in the ED   · PTA regimen: metformin and Trulicity although patient states he has not been using Trulicity because he thought that was a reflux medication   · Inpatient regimen: SSI with accuchecks- adjust as necessary   · Patient states his glucometer broke, he will need new supplies at discharge  · Avoid hypoglycemia

## 2022-08-16 NOTE — RESPIRATORY THERAPY NOTE
Pt unable to wear hospital provided headgear/face mask for BIPAP as unable to avoid his posterior neck abscess and cellulitis

## 2022-08-16 NOTE — PLAN OF CARE
Problem: METABOLIC, FLUID AND ELECTROLYTES - ADULT  Goal: Glucose maintained within target range  Description: INTERVENTIONS:  - Monitor Blood Glucose as ordered  - Assess for signs and symptoms of hyperglycemia and hypoglycemia  - Administer ordered medications to maintain glucose within target range  - Assess nutritional intake and initiate nutrition service referral as needed  Outcome: Progressing     Problem: SKIN/TISSUE INTEGRITY - ADULT  Goal: Incision(s), wounds(s) or drain site(s) healing without S/S of infection  Description: INTERVENTIONS  - Assess and document dressing, incision, wound bed, drain sites and surrounding tissue  - Provide patient and family education  - Perform skin care/dressing changes every shift  Outcome: Progressing

## 2022-08-16 NOTE — CONSULTS
Consultation - General Surgery  [facility] 48 [facility]  [facility] MRN: [id number]  Unit/Bed#: -01 Encounter: [id number]    Reason for Consult: Abscess of neck      Assessment/Plan:  47 y/o male with posterior neck abscess  Onset about a month ago, however has gotten worse for past few days (painful, erythematous)  Large area of induration with smaller central area of fluctuance with spontaneous purulent drainage  CT revealed superficial subq 6mm abscess  No fever or leukocytosis  Lactic acidosis on arrival (2 1) now resolved  K 3 4 (3 7)    Replete K  Plan for OR tomorrow for I&D  Continue cefazolin/vanco  NPO pMN for procedure  Possible discharge tomorrow after I&D  Discussed with attending      HPI:    Adriel Roca is a 48 y o  male with morbid obesity, HTN, HLD, T2DM, SANJIV on BiPAP qhs who presents with posterior neck pain x 1 month, worsening with drainage x 3 days  Denies constitutional symptoms  He thinks it may have been caused by his BiPAP strap  Reports poor control of diabetes  At the time of my exam, pt reports that his pain is unchanged and bothersome  He has noticed continued purulent drainage from the site  Denies fever/chills  Review of Systems:  Review of Systems   Constitutional: Negative for appetite change, chills and fever  Respiratory: Negative for shortness of breath  Cardiovascular: Negative for chest pain  Gastrointestinal: Negative for abdominal pain, constipation, diarrhea, nausea and vomiting  Genitourinary: Negative for difficulty urinating and dysuria  Skin: Positive for wound  Historical Information   Past Medical History:   Diagnosis Date    Sleep apnea, obstructive      History reviewed  No pertinent surgical history    Social History   Social History     Substance and Sexual Activity   Alcohol Use Never     Social History     Substance and Sexual Activity   Drug Use Never     Social History     Tobacco Use   Smoking Status Never Smoker   Smokeless Tobacco Current User    Types: Chew   Tobacco Comment    chewing tobacco occasionally      History reviewed  No pertinent family history  Medications:  Current Facility-Administered Medications   Medication Dose Route Frequency    acetaminophen (TYLENOL) tablet 650 mg  650 mg Oral Q6H PRN    ALPRAZolam (XANAX) tablet 0 5 mg  0 5 mg Oral Q8H PRN    amLODIPine (NORVASC) tablet 5 mg  5 mg Oral Daily    doxazosin (CARDURA) tablet 4 mg  4 mg Oral BID    hydrALAZINE (APRESOLINE) tablet 50 mg  50 mg Oral Q12H    hydrochlorothiazide (HYDRODIURIL) tablet 25 mg  25 mg Oral Daily    insulin lispro (HumaLOG) 100 units/mL subcutaneous injection 2-12 Units  2-12 Units Subcutaneous TID AC    insulin lispro (HumaLOG) 100 units/mL subcutaneous injection 2-12 Units  2-12 Units Subcutaneous HS    lactated ringers infusion  75 mL/hr Intravenous Continuous    losartan (COZAAR) tablet 100 mg  100 mg Oral Daily    metoprolol tartrate (LOPRESSOR) tablet 50 mg  50 mg Oral Q12H LUTHER    ondansetron (ZOFRAN) injection 4 mg  4 mg Intravenous Q6H PRN    pravastatin (PRAVACHOL) tablet 20 mg  20 mg Oral Daily With Dinner    spironolactone (ALDACTONE) tablet 25 mg  25 mg Oral Daily    vancomycin (VANCOCIN) 1,750 mg in sodium chloride 0 9 % 500 mL IVPB  17 5 mg/kg (Adjusted) Intravenous Q12H       No Known Allergies    Physical Examination:  Vitals:   Vitals:    08/15/22 2001 08/16/22 0500 08/16/22 0757 08/16/22 1535   BP: 160/85  157/87 141/76   BP Location: Right arm  Right arm Right arm   Pulse: 103  77 83   Resp: 16  20 18   Temp: 98 2 °F (36 8 °C)  99 9 °F (37 7 °C) 99 1 °F (37 3 °C)   TempSrc: Tympanic  Oral Tympanic   SpO2: 98%  96% 95%   Weight: (!) 149 kg (329 lb 9 4 oz) (!) 151 kg (332 lb 14 3 oz)     Height: 5' 9" (1 753 m)        Temp  Min: 98 1 °F (36 7 °C)  Max: 99 9 °F (37 7 °C)  IBW (Ideal Body Weight): 70 7 kg    Physical Exam  Vitals and nursing note reviewed     Constitutional:       General: He is not in acute distress  Appearance: He is obese  He is not toxic-appearing  HENT:      Head: Normocephalic and atraumatic  Eyes:      Extraocular Movements: Extraocular movements intact  Conjunctiva/sclera: Conjunctivae normal    Neck:      Comments: ROM limited 2/2 pain  Large area of induration noted on posterior neck/head with central area of fluctuance and purulent drainage  Cardiovascular:      Rate and Rhythm: Normal rate and regular rhythm  Heart sounds: Normal heart sounds  Pulmonary:      Effort: Pulmonary effort is normal  No respiratory distress  Musculoskeletal:         General: Normal range of motion  Right lower leg: No edema  Left lower leg: No edema  Lymphadenopathy:      Cervical: No cervical adenopathy  Skin:     General: Skin is warm and dry  Neurological:      Mental Status: He is alert and oriented to person, place, and time  Psychiatric:         Mood and Affect: Mood normal          Behavior: Behavior normal          Thought Content: Thought content normal           Diagnostic Data:  Lab: I have personally reviewed pertinent lab results  Results from last 7 days   Lab Units 08/16/22  0613   WBC Thousand/uL 9 43   HEMOGLOBIN g/dL 12 0   HEMATOCRIT % 34 3*   PLATELETS Thousands/uL 130*     Results from last 7 days   Lab Units 08/16/22  0613 08/15/22  1707   POTASSIUM mmol/L 3 4* 3 7   CHLORIDE mmol/L 101 96   CO2 mmol/L 18* 20*   BUN mg/dL 25 29*   CREATININE mg/dL 1 51* 1 62*   CALCIUM mg/dL 9 0 9 8   ALK PHOS U/L  --  66   ALT U/L  --  32   AST U/L  --  26       Imaging: I have personally reviewed the pertinent imaging studies on the PACS system  Procedure: CT soft tissue neck with contrast    Result Date: 8/15/2022  Narrative: CT NECK WITH CONTRAST INDICATION: Neck swelling  COMPARISON:  8/15/2022  TECHNIQUE:  Axial, sagittal, and coronal 2D reformatted images were created from the axial source data and submitted for interpretation   Radiation dose length product (DLP) for this visit:  977 mGy-cm   This examination, like all CT scans performed in the [place], was performed utilizing techniques to minimize radiation dose exposure, including the use of iterative reconstruction and automated exposure control  IV Contrast:  65 mL of iohexol (OMNIPAQUE) IMAGE QUALITY:  Diagnostic  FINDINGS: VISUALIZED BRAIN PARENCHYMA:  Unremarkable  VISUALIZED ORBITS AND PARANASAL SINUSES:  Intact globes and orbits  Mucosal thickening in the maxillary sinuses NASAL CAVITY AND NASOPHARYNX:  No nasopharyngeal inflammation  Patent nasal cavity  SUPRAHYOID NECK:  Intact oral cavity  No parapharyngeal retropharyngeal inflammation  Superficial and deep subcutaneous inflammation in the right suboccipital region with overlying cutaneous thickening  Superficial subcutaneous 6 mm fluid collection  Stable cluster of mildly enlarged lymph nodes in the right suboccipital region, likely reactive  INFRAHYOID NECK:  Epiglottis, aryepiglottic folds and piriform sinuses are normal   Normal glottis and subglottic airway  THYROID GLAND:  Unremarkable  PAROTID AND SUBMANDIBULAR GLANDS:  Normal  LYMPH NODES:  No pathologic or enlarged adenopathy  VASCULAR STRUCTURES:  Normal enhancement of the cervical vasculature  THORACIC INLET:  Lung apices and upper mediastinum are unremarkable  BONY STRUCTURES: No acute fracture or destructive osseous lesion  Impression: Deep and superficial soft tissue cellulitis in the right suboccipital region  Superficial subcutaneous 6 mm abscess  Workstation performed: [id number]       Active medications:   The patients active medications were reviewed and modified as appropriate  VTE Prophylaxis: Heparin      Code Status: Level 1 - Full Code    Юлия Cisneros PA-C  8/16/2022

## 2022-08-17 ENCOUNTER — ANESTHESIA EVENT (INPATIENT)
Dept: PERIOP | Facility: HOSPITAL | Age: 47
DRG: 580 | End: 2022-08-17
Payer: COMMERCIAL

## 2022-08-17 ENCOUNTER — ANESTHESIA (INPATIENT)
Dept: PERIOP | Facility: HOSPITAL | Age: 47
DRG: 580 | End: 2022-08-17
Payer: COMMERCIAL

## 2022-08-17 LAB
GLUCOSE SERPL-MCNC: 228 MG/DL (ref 65–140)
GLUCOSE SERPL-MCNC: 241 MG/DL (ref 65–140)
GLUCOSE SERPL-MCNC: 243 MG/DL (ref 65–140)
GLUCOSE SERPL-MCNC: 462 MG/DL (ref 65–140)
GLUCOSE SERPL-MCNC: 484 MG/DL (ref 65–140)
VANCOMYCIN TROUGH SERPL-MCNC: 14.2 UG/ML (ref 10–20)

## 2022-08-17 PROCEDURE — 87075 CULTR BACTERIA EXCEPT BLOOD: CPT | Performed by: SURGERY

## 2022-08-17 PROCEDURE — 10160 PNXR ASPIR ABSC HMTMA BULLA: CPT | Performed by: PHYSICIAN ASSISTANT

## 2022-08-17 PROCEDURE — 0W960ZZ DRAINAGE OF NECK, OPEN APPROACH: ICD-10-PCS | Performed by: SURGERY

## 2022-08-17 PROCEDURE — 80202 ASSAY OF VANCOMYCIN: CPT | Performed by: PHYSICIAN ASSISTANT

## 2022-08-17 PROCEDURE — 10160 PNXR ASPIR ABSC HMTMA BULLA: CPT | Performed by: SURGERY

## 2022-08-17 PROCEDURE — 99232 SBSQ HOSP IP/OBS MODERATE 35: CPT | Performed by: PHYSICIAN ASSISTANT

## 2022-08-17 PROCEDURE — 88304 TISSUE EXAM BY PATHOLOGIST: CPT | Performed by: SPECIALIST

## 2022-08-17 PROCEDURE — 99232 SBSQ HOSP IP/OBS MODERATE 35: CPT | Performed by: INTERNAL MEDICINE

## 2022-08-17 PROCEDURE — 82948 REAGENT STRIP/BLOOD GLUCOSE: CPT

## 2022-08-17 RX ORDER — FENTANYL CITRATE 50 UG/ML
INJECTION, SOLUTION INTRAMUSCULAR; INTRAVENOUS AS NEEDED
Status: DISCONTINUED | OUTPATIENT
Start: 2022-08-17 | End: 2022-08-17

## 2022-08-17 RX ORDER — OXYCODONE HYDROCHLORIDE AND ACETAMINOPHEN 5; 325 MG/1; MG/1
1 TABLET ORAL EVERY 6 HOURS PRN
Status: DISCONTINUED | OUTPATIENT
Start: 2022-08-17 | End: 2022-08-18 | Stop reason: HOSPADM

## 2022-08-17 RX ORDER — ALBUTEROL SULFATE 2.5 MG/3ML
2.5 SOLUTION RESPIRATORY (INHALATION) ONCE AS NEEDED
Status: DISCONTINUED | OUTPATIENT
Start: 2022-08-17 | End: 2022-08-17 | Stop reason: HOSPADM

## 2022-08-17 RX ORDER — LIDOCAINE HYDROCHLORIDE 10 MG/ML
INJECTION, SOLUTION EPIDURAL; INFILTRATION; INTRACAUDAL; PERINEURAL AS NEEDED
Status: DISCONTINUED | OUTPATIENT
Start: 2022-08-17 | End: 2022-08-17

## 2022-08-17 RX ORDER — FENTANYL CITRATE/PF 50 MCG/ML
25 SYRINGE (ML) INJECTION
Status: DISCONTINUED | OUTPATIENT
Start: 2022-08-17 | End: 2022-08-17 | Stop reason: HOSPADM

## 2022-08-17 RX ORDER — DEXAMETHASONE SODIUM PHOSPHATE 10 MG/ML
INJECTION, SOLUTION INTRAMUSCULAR; INTRAVENOUS AS NEEDED
Status: DISCONTINUED | OUTPATIENT
Start: 2022-08-17 | End: 2022-08-17

## 2022-08-17 RX ORDER — PROPOFOL 10 MG/ML
INJECTION, EMULSION INTRAVENOUS AS NEEDED
Status: DISCONTINUED | OUTPATIENT
Start: 2022-08-17 | End: 2022-08-17

## 2022-08-17 RX ORDER — SUCCINYLCHOLINE/SOD CL,ISO/PF 100 MG/5ML
SYRINGE (ML) INTRAVENOUS AS NEEDED
Status: DISCONTINUED | OUTPATIENT
Start: 2022-08-17 | End: 2022-08-17

## 2022-08-17 RX ORDER — HYDROMORPHONE HCL/PF 1 MG/ML
0.5 SYRINGE (ML) INJECTION
Status: DISCONTINUED | OUTPATIENT
Start: 2022-08-17 | End: 2022-08-17 | Stop reason: HOSPADM

## 2022-08-17 RX ORDER — PROMETHAZINE HYDROCHLORIDE 25 MG/ML
12.5 INJECTION, SOLUTION INTRAMUSCULAR; INTRAVENOUS ONCE AS NEEDED
Status: DISCONTINUED | OUTPATIENT
Start: 2022-08-17 | End: 2022-08-17 | Stop reason: HOSPADM

## 2022-08-17 RX ORDER — ONDANSETRON 2 MG/ML
4 INJECTION INTRAMUSCULAR; INTRAVENOUS ONCE AS NEEDED
Status: DISCONTINUED | OUTPATIENT
Start: 2022-08-17 | End: 2022-08-17 | Stop reason: HOSPADM

## 2022-08-17 RX ORDER — ONDANSETRON 2 MG/ML
INJECTION INTRAMUSCULAR; INTRAVENOUS AS NEEDED
Status: DISCONTINUED | OUTPATIENT
Start: 2022-08-17 | End: 2022-08-17

## 2022-08-17 RX ORDER — BUPIVACAINE HYDROCHLORIDE AND EPINEPHRINE 2.5; 5 MG/ML; UG/ML
INJECTION, SOLUTION EPIDURAL; INFILTRATION; INTRACAUDAL; PERINEURAL AS NEEDED
Status: DISCONTINUED | OUTPATIENT
Start: 2022-08-17 | End: 2022-08-17 | Stop reason: HOSPADM

## 2022-08-17 RX ORDER — MIDAZOLAM HYDROCHLORIDE 2 MG/2ML
INJECTION, SOLUTION INTRAMUSCULAR; INTRAVENOUS AS NEEDED
Status: DISCONTINUED | OUTPATIENT
Start: 2022-08-17 | End: 2022-08-17

## 2022-08-17 RX ORDER — DEXMEDETOMIDINE HYDROCHLORIDE 100 UG/ML
INJECTION, SOLUTION INTRAVENOUS AS NEEDED
Status: DISCONTINUED | OUTPATIENT
Start: 2022-08-17 | End: 2022-08-17

## 2022-08-17 RX ORDER — MEPERIDINE HYDROCHLORIDE 25 MG/ML
12.5 INJECTION INTRAMUSCULAR; INTRAVENOUS; SUBCUTANEOUS
Status: DISCONTINUED | OUTPATIENT
Start: 2022-08-17 | End: 2022-08-17 | Stop reason: HOSPADM

## 2022-08-17 RX ORDER — HYDROMORPHONE HCL/PF 1 MG/ML
0.5 SYRINGE (ML) INJECTION EVERY 6 HOURS PRN
Status: DISCONTINUED | OUTPATIENT
Start: 2022-08-17 | End: 2022-08-18 | Stop reason: HOSPADM

## 2022-08-17 RX ADMIN — Medication 200 MG: at 09:52

## 2022-08-17 RX ADMIN — METOPROLOL TARTRATE 50 MG: 50 TABLET, FILM COATED ORAL at 08:32

## 2022-08-17 RX ADMIN — INSULIN LISPRO 2 UNITS: 100 INJECTION, SOLUTION INTRAVENOUS; SUBCUTANEOUS at 17:25

## 2022-08-17 RX ADMIN — SPIRONOLACTONE 25 MG: 25 TABLET ORAL at 08:32

## 2022-08-17 RX ADMIN — INSULIN LISPRO 12 UNITS: 100 INJECTION, SOLUTION INTRAVENOUS; SUBCUTANEOUS at 21:40

## 2022-08-17 RX ADMIN — ONDANSETRON 4 MG: 2 INJECTION INTRAMUSCULAR; INTRAVENOUS at 10:30

## 2022-08-17 RX ADMIN — FENTANYL CITRATE 50 MCG: 50 INJECTION, SOLUTION INTRAMUSCULAR; INTRAVENOUS at 09:52

## 2022-08-17 RX ADMIN — HEPARIN SODIUM 5000 UNITS: 5000 INJECTION INTRAVENOUS; SUBCUTANEOUS at 15:02

## 2022-08-17 RX ADMIN — FENTANYL CITRATE 50 MCG: 50 INJECTION, SOLUTION INTRAMUSCULAR; INTRAVENOUS at 10:13

## 2022-08-17 RX ADMIN — INSULIN LISPRO 4 UNITS: 100 INJECTION, SOLUTION INTRAVENOUS; SUBCUTANEOUS at 11:30

## 2022-08-17 RX ADMIN — DEXAMETHASONE SODIUM PHOSPHATE 10 MG: 10 INJECTION INTRAMUSCULAR; INTRAVENOUS at 10:00

## 2022-08-17 RX ADMIN — LIDOCAINE HYDROCHLORIDE 60 MG: 10 INJECTION, SOLUTION EPIDURAL; INFILTRATION; INTRACAUDAL at 09:52

## 2022-08-17 RX ADMIN — PROPOFOL 300 MG: 10 INJECTION, EMULSION INTRAVENOUS at 09:52

## 2022-08-17 RX ADMIN — VANCOMYCIN HYDROCHLORIDE 1750 MG: 1 INJECTION, POWDER, LYOPHILIZED, FOR SOLUTION INTRAVENOUS at 06:26

## 2022-08-17 RX ADMIN — INSULIN LISPRO 4 UNITS: 100 INJECTION, SOLUTION INTRAVENOUS; SUBCUTANEOUS at 08:31

## 2022-08-17 RX ADMIN — HEPARIN SODIUM 5000 UNITS: 5000 INJECTION INTRAVENOUS; SUBCUTANEOUS at 21:40

## 2022-08-17 RX ADMIN — METOPROLOL TARTRATE 50 MG: 50 TABLET, FILM COATED ORAL at 21:40

## 2022-08-17 RX ADMIN — DOXAZOSIN 4 MG: 4 TABLET ORAL at 17:26

## 2022-08-17 RX ADMIN — HYDRALAZINE HYDROCHLORIDE 50 MG: 25 TABLET, FILM COATED ORAL at 08:32

## 2022-08-17 RX ADMIN — MIDAZOLAM HYDROCHLORIDE 2 MG: 1 INJECTION, SOLUTION INTRAMUSCULAR; INTRAVENOUS at 09:40

## 2022-08-17 RX ADMIN — SODIUM CHLORIDE, POTASSIUM CHLORIDE, SODIUM LACTATE AND CALCIUM CHLORIDE 75 ML/HR: 600; 310; 30; 20 INJECTION, SOLUTION INTRAVENOUS at 03:05

## 2022-08-17 RX ADMIN — VANCOMYCIN HYDROCHLORIDE 2000 MG: 1 INJECTION, POWDER, LYOPHILIZED, FOR SOLUTION INTRAVENOUS at 17:07

## 2022-08-17 RX ADMIN — DEXMEDETOMIDINE HCL 8 MCG: 100 INJECTION INTRAVENOUS at 10:01

## 2022-08-17 RX ADMIN — AMLODIPINE BESYLATE 5 MG: 5 TABLET ORAL at 08:32

## 2022-08-17 RX ADMIN — HEPARIN SODIUM 5000 UNITS: 5000 INJECTION INTRAVENOUS; SUBCUTANEOUS at 05:59

## 2022-08-17 RX ADMIN — DEXMEDETOMIDINE HCL 8 MCG: 100 INJECTION INTRAVENOUS at 10:10

## 2022-08-17 RX ADMIN — LOSARTAN POTASSIUM 100 MG: 50 TABLET, FILM COATED ORAL at 08:32

## 2022-08-17 RX ADMIN — DOXAZOSIN 4 MG: 4 TABLET ORAL at 08:32

## 2022-08-17 RX ADMIN — HYDROCHLOROTHIAZIDE 25 MG: 25 TABLET ORAL at 08:32

## 2022-08-17 RX ADMIN — PRAVASTATIN SODIUM 20 MG: 20 TABLET ORAL at 16:25

## 2022-08-17 NOTE — RESPIRATORY THERAPY NOTE
RT Protocol Note  Lisy Fuentes 48 y o  male MRN: [id number]  Unit/Bed#: -01 Encounter: [id number]    Assessment    Principal Problem:    Cellulitis and abscess of neck  Active Problems: Morbid obesity (HCC)    Hypertension    SANJIV (obstructive sleep apnea)    Hyperlipidemia    Hyponatremia    Elevated serum creatinine    Type 2 diabetes mellitus (HCC)      Home Pulmonary Medications:  None    Home Devices/Therapy: BiPAP/CPAP    Past Medical History:   Diagnosis Date    Sleep apnea, obstructive      Social History     Socioeconomic History    Marital status: Single     Spouse name: None    Number of children: None    Years of education: None    Highest education level: None   Occupational History    None   Tobacco Use    Smoking status: Never Smoker    Smokeless tobacco: Current User     Types: Chew    Tobacco comment: chewing tobacco occasionally    Vaping Use    Vaping Use: Never used   Substance and Sexual Activity    Alcohol use: Never    Drug use: Never    Sexual activity: Not Currently   Other Topics Concern    None   Social History Narrative    None     Social Determinants of Health     Financial Resource Strain: Not on file   Food Insecurity: Not on file   Transportation Needs: Not on file   Physical Activity: Not on file   Stress: Not on file   Social Connections: Not on file   Intimate Partner Violence: Not on file   Housing Stability: Not on file       Subjective         Objective    Physical Exam:   Assessment Type: Assess only  General Appearance: Alert, Awake  Respiratory Pattern: Normal  Chest Assessment: Chest expansion symmetrical  Bilateral Breath Sounds: Diminished    Vitals:  Blood pressure 139/76, pulse 80, temperature 98 9 °F (37 2 °C), temperature source Tympanic, resp  rate 20, height 5' 9" (1 753 m), weight (!) 151 kg (332 lb 14 3 oz), SpO2 95 %  Imaging and other studies: I have personally reviewed pertinent reports              Plan    Respiratory Plan: No distress/Pulmonary history        Resp Comments: (P) Patient assessed per Respiratory Protocol  Patient has a history of SANJIV and wears Bipap at night  Pt unable to wear hospital provided headgear/face mask for BIPAP as unable to avoid his posterior neck abscess  Respiratory to follow

## 2022-08-17 NOTE — QUICK NOTE
S/P I&D neck abscess  Abscess cavity packed with 1-inch plain packing and covered with ABD folded in half, secured with tape  Packing to be removed tomorrow 8/18, then wound to be covered with silver dressing  Pending clinical progression, will be ordering follow up CT head/neck with IV contrast when appropriate       Anders Montenegro PA-C  8/17/2022

## 2022-08-17 NOTE — PROGRESS NOTES
[facility] 43  Progress Note Doc Marissa Fuentes 1975, 48 y o  male MRN: [id number]  Unit/Bed#: -[place] Encounter: [id number]  Primary Care Provider: Priyanka Schaffer MD   Date and time admitted to hospital: 8/15/2022  3:54 PM    Type 2 diabetes mellitus [facility])  Assessment & Plan  Lab Results   Component Value Date    HGBA1C 10 1 (H) 08/15/2022       Recent Labs     08/16/22 2000 08/17/22  0707 08/17/22  1057 08/17/22  1146   POCGLU 255* 228* 243* 241*       Blood Sugar Average: Last 72 hrs:  (P) 244   · Check HgbA1c  · Hyperglycemia 374 on admission, given 8U regular insulin in the ED   · PTA regimen: metformin and Trulicity although patient states he has not been using Trulicity because he thought that was a reflux medication   · Inpatient regimen: SSI with accuchecks- adjust as necessary   · Patient states his glucometer broke, he will need new supplies at discharge  · Avoid hypoglycemia     Elevated serum creatinine  Assessment & Plan  · Creatinine 1 62 with GFR 49 on admission   · No prior labs available for review, unknown baseline   · IVF    · I/O  · Avoid nephrotoxins/hypotension   · Continue to trend Cr     Hyponatremia  Assessment & Plan  · Pseudohyponatremia in setting of hyperglycemia   · Corrected sodium for hyperglycemia 133   · Will continue HCTZ for now due to hypertension, if sodium worsens despite correcting hyperglycemia, consider holding    Hyperlipidemia  Assessment & Plan  Continue statin     SANJIV (obstructive sleep apnea)  Assessment & Plan  · Continue BiPAP qhs as tolerated with pain, patient with right suboccipital cellulitis with abscess    Hypertension  Assessment & Plan  · BP elevated on admission 171/84  · Continue PTA antihypertensives: doxazosin, hydralazine, Norvasc, HCTZ, losartan, spironolactone, lopressor     Morbid obesity (HCC)  Assessment & Plan  · BMI 48 73  · Encourage lifestyle modification/weight loss   · Nutrition consult     * Cellulitis and abscess of neck  Assessment & Plan  Patient presents with 2 days of right posterior neck pain, erythema, drainage  States that he believes its from his BiPAP straps at home  · CT soft tissue neck w/ contrast: deep and superficial soft tissue cellulitis in the right suboccipital region  Superficial subcutaneous 6mm abscess   · Currently afebrile and without leukocytosis  Hemodynamically stable  · Lactic acid 2 1, repeat in 2h     · NPO at midnight for abscess drainage tomorrow   · IVF   · IV abx: Cefazolin + Vancomycin   · General surgery consult appreciated  ·             Subjective/Objective     Subjective:   Pt feels better, pt had drain of neck in OR ,pt has no fever ,pt has nausea or vomiting     Objective:  Vitals: Blood pressure 148/76, pulse 88, temperature 98 9 °F (37 2 °C), temperature source Tympanic, resp  rate 20, height 5' 9" (1 753 m), weight (!) 151 kg (332 lb 14 3 oz), SpO2 95 %  ,Body mass index is 49 16 kg/m²  Intake/Output Summary (Last 24 hours) at 8/17/2022 1712  Last data filed at 8/17/2022 1047  Gross per 24 hour   Intake 2522 7 ml   Output --   Net 2522 7 ml       Invasive Devices  Report    Peripheral Intravenous Line  Duration           Peripheral IV 08/15/22 Left Antecubital 2 days    Peripheral IV 08/15/22 Left;Ventral (anterior) Forearm 2 days                Physical Exam:  HEENT-PERRLA, moist oral mucosa  Neck-supple, no JVD elevation   Back of neck is bandaged   Respiratory-equal air entry bilaterally, no rales or rhonchi  Cardiovascular system-S1, S2 heard, no murmur or gallops or rubs  Abdomen-soft, nontender, no guarding or rigidity, bowel sounds heard  Extremities-no pedal edema  Peripheral pulses palpable  Musculoskeletal-no contractures  Central nervous system-no acute focal neurological deficit ,no sensory or motor deficit noted  Skin-no rash noted        Lab, Imaging and other studies: I have personally reviewed pertinent reports      VTE Pharmacologic Prophylaxis: Heparin  VTE Mechanical Prophylaxis: sequential compression device

## 2022-08-17 NOTE — ANESTHESIA PREPROCEDURE EVALUATION
Procedure:  INCISION AND DRAINAGE  (I&D) NECK (N/A Neck)    Relevant Problems   CARDIO   (+) Hyperlipidemia   (+) Hypertension      ENDO   (+) Type 2 diabetes mellitus (HCC)      PULMONARY   (+) SANJIV (obstructive sleep apnea)      Other   (+) Morbid obesity (HCC)        Physical Exam    Airway    Mallampati score: II  TM Distance: >3 FB  Neck ROM: full     Dental       Cardiovascular  Cardiovascular exam normal    Pulmonary  Pulmonary exam normal     Other Findings        Anesthesia Plan  ASA Score- 3     Anesthesia Type- general with ASA Monitors  Additional Monitors:   Airway Plan: ETT  Plan Factors-Exercise tolerance (METS): >4 METS  Chart reviewed  EKG reviewed  Imaging results reviewed  Existing labs reviewed  Patient summary reviewed  Patient is not a current smoker  Patient did not smoke on day of surgery  Obstructive sleep apnea risk education given perioperatively  Induction- intravenous  Postoperative Plan- Plan for postoperative opioid use  Planned trial extubation    Informed Consent- Anesthetic plan and risks discussed with patient  I personally reviewed this patient with the CRNA  Discussed and agreed on the Anesthesia Plan with the CRNA  Daphnie Reardon

## 2022-08-17 NOTE — PROGRESS NOTES
Vancomycin Assessment    Adriel Roca is a 48 y o  male who is currently receiving vancomycin 1750 mg IV q12h for skin-soft tissue infection  Relevant clinical data and objective history reviewed:  Creatinine   Date Value Ref Range Status   08/16/2022 1 51 (H) 0 60 - 1 30 mg/dL Final     Comment:     Standardized to IDMS reference method   08/15/2022 1 62 (H) 0 60 - 1 30 mg/dL Final     Comment:     Standardized to IDMS reference method     /79 (BP Location: Right arm)   Pulse 80   Temp 99 6 °F (37 6 °C) (Tympanic)   Resp 20   Ht 5' 9" (1 753 m)   Wt (!) 151 kg (332 lb 14 3 oz)   SpO2 95%   BMI 49 16 kg/m²   I/O last 3 completed shifts: In: 2461 4 [P O :150; I V :2311 4]  Out: -   Lab Results   Component Value Date/Time    BUN 25 08/16/2022 06:13 AM    WBC 9 43 08/16/2022 06:13 AM    HGB 12 0 08/16/2022 06:13 AM    HCT 34 3 (L) 08/16/2022 06:13 AM    MCV 90 08/16/2022 06:13 AM     (L) 08/16/2022 06:13 AM     Temp Readings from Last 3 Encounters:   08/17/22 99 6 °F (37 6 °C) (Tympanic)   04/12/22 98 4 °F (36 9 °C) (Tympanic)   01/11/22 (!) 97 2 °F (36 2 °C) (Tympanic)     Vancomycin Days of Therapy: 2    Assessment/Plan  The patient is currently on vancomycin utilizing scheduled dosing  Baseline risks associated with therapy include: concomitant nephrotoxic medications  The patient is receiving 1750 mg IV q12h with the most recent vancomycin level being at steady-state and sub-therapeutic based on a goal of 15-20 (appropriate for most indications) (14 2); therefore, after clinical evaluation will be changed to 2000 mg IV q12h, starting 10 5 hours after the most recent dose  Pharmacy will continue to follow closely for s/sx of nephrotoxicity, infusion reactions, and appropriateness of therapy  BMP and CBC will be ordered per protocol  Plan for trough as patient approaches steady state, prior to the 4th dose at approximately 4:30 on 8/19/22   Pharmacy will continue to follow the patients culture results and clinical progress daily      Prakash Licona, Pharmacist

## 2022-08-17 NOTE — ASSESSMENT & PLAN NOTE
Patient presents with 2 days of right posterior neck pain, erythema, drainage  States that he believes its from his BiPAP straps at home  · CT soft tissue neck w/ contrast: deep and superficial soft tissue cellulitis in the right suboccipital region  Superficial subcutaneous 6mm abscess   · Currently afebrile and without leukocytosis  Hemodynamically stable     · Lactic acid 2 1, repeat in 2h     · NPO at midnight for abscess drainage tomorrow   · IVF   · IV abx: Cefazolin + Vancomycin   · General surgery consult appreciated  ·

## 2022-08-17 NOTE — ANESTHESIA POSTPROCEDURE EVALUATION
Post-Op Assessment Note    CV Status:  Stable  Pain Score: 0    Pain management: adequate     Mental Status:  Alert and awake   Hydration Status:  Euvolemic   PONV Controlled:  Controlled   Airway Patency:  Patent      Post Op Vitals Reviewed: Yes      Staff: CRNA         No complications documented      /60 (08/17/22 1052)    Temp (!) 97 3 °F (36 3 °C) (08/17/22 1052)    Pulse 84 (08/17/22 1052)   Resp 15 (08/17/22 1052)    SpO2 93 % (08/17/22 1052)

## 2022-08-17 NOTE — PROGRESS NOTES
Progress Note - General Surgery   Michelle Fuentes 48 y o  male MRN: [id number]  Unit/Bed#: -01 Encounter: [id number]    Assessment/Plan    47 y/o male with posterior neck abscess  Pt with continued pain from abscess  Dressing in place with some soilage  Low-grade fevers (Tmax 100 9F), otherwise VSS on RA  No new labs    NPO  To OR this morning for I&D  Continue abx  Plan to discharge home after procedure with outpatient follow up  Discussed with attending      /81 (BP Location: Right arm)   Pulse 78   Temp 99 1 °F (37 3 °C) (Oral)   Resp 20   Ht 5' 9" (1 753 m)   Wt (!) 151 kg (332 lb 14 3 oz)   SpO2 95%   BMI 49 16 kg/m²     Labs in chart were reviewed  Intake/Output Summary (Last 24 hours) at 8/17/2022 0819  Last data filed at 8/17/2022 0305  Gross per 24 hour   Intake 1662 7 ml   Output --   Net 1662 7 ml           Subjective/Objective     Subjective: Pt seen and examined  No acute overnight events  Pt continues to have pain at abscess site  Discussed plan to go to OR this morning for I&D; pt in agreement  Review of Systems   Constitutional: Negative for chills and fever  Respiratory: Negative for shortness of breath  Cardiovascular: Negative for chest pain  Gastrointestinal: Negative for abdominal pain, constipation, diarrhea, nausea and vomiting  Genitourinary: Negative for difficulty urinating and dysuria  Skin: Positive for wound  Objective:     Physical Exam  Vitals and nursing note reviewed  Constitutional:       General: He is not in acute distress  Appearance: He is obese  He is not toxic-appearing  HENT:      Head: Normocephalic  Comments: Posterior neck/head wound with dressing in place  Eyes:      Extraocular Movements: Extraocular movements intact  Pulmonary:      Effort: Pulmonary effort is normal  No respiratory distress  Musculoskeletal:         General: Normal range of motion  Right lower leg: No edema        Left lower leg: No edema  Skin:     General: Skin is warm and dry  Neurological:      Mental Status: He is alert and oriented to person, place, and time  Psychiatric:         Mood and Affect: Mood normal          Behavior: Behavior normal          Thought Content:  Thought content normal             Balbina Nicholas PA-C  8/17/2022

## 2022-08-17 NOTE — ASSESSMENT & PLAN NOTE
Lab Results   Component Value Date    HGBA1C 10 1 (H) 08/15/2022       Recent Labs     08/16/22 2000 08/17/22  0707 08/17/22  1057 08/17/22  1146   POCGLU 255* 228* 243* 241*       Blood Sugar Average: Last 72 hrs:  (P) 244   · Check HgbA1c  · Hyperglycemia 374 on admission, given 8U regular insulin in the ED   · PTA regimen: metformin and Trulicity although patient states he has not been using Trulicity because he thought that was a reflux medication   · Inpatient regimen: SSI with accuchecks- adjust as necessary   · Patient states his glucometer broke, he will need new supplies at discharge  · Avoid hypoglycemia

## 2022-08-17 NOTE — PLAN OF CARE
Problem: Potential for Falls  Goal: Patient will remain free of falls  Description: INTERVENTIONS:  - Educate patient/family on patient safety including physical limitations  - Instruct patient to call for assistance with activity   - Consult OT/PT to assist with strengthening/mobility   - Keep Call bell within reach  - Keep bed low and locked with side rails adjusted as appropriate  - Keep care items and personal belongings within reach  - Initiate and maintain comfort rounds  Outcome: Progressing     Problem: PAIN - ADULT  Goal: Verbalizes/displays adequate comfort level or baseline comfort level  Description: Interventions:  - Encourage patient to monitor pain and request assistance  - Assess pain using appropriate pain scale  - Administer analgesics based on type and severity of pain and evaluate response  - Implement non-pharmacological measures as appropriate and evaluate response  - Consider cultural and social influences on pain and pain management  - Notify physician/advanced practitioner if interventions unsuccessful or patient reports new pain  Outcome: Progressing     Problem: INFECTION - ADULT  Goal: Absence or prevention of progression during hospitalization  Description: INTERVENTIONS:  - Assess and monitor for signs and symptoms of infection  - Monitor lab/diagnostic results  - Administer medications as ordered  - Instruct and encourage patient and family to use good hand hygiene technique  - Identify and instruct in appropriate isolation precautions for identified infection/condition  Outcome: Progressing  Goal: Absence of fever/infection during neutropenic period  Description: INTERVENTIONS:  - Monitor WBC    Outcome: Progressing     Problem: SAFETY ADULT  Goal: Patient will remain free of falls  Description: INTERVENTIONS:  - Educate patient/family on patient safety including physical limitations  - Instruct patient to call for assistance with activity   - Consult OT/PT to assist with strengthening/mobility   - Keep Call bell within reach  - Keep bed low and locked with side rails adjusted as appropriate  - Keep care items and personal belongings within reach  - Initiate and maintain comfort rounds  - Offer Toileting every  2  Hours, in advance of need  Outcome: Progressing  Goal: Maintain or return to baseline ADL function  Description: INTERVENTIONS:  -  Assess patient's ability to carry out ADLs; assess patient's baseline for ADL function and identify physical deficits which impact ability to perform ADLs (bathing, care of mouth/teeth, toileting, grooming, dressing, etc )  - Assess/evaluate cause of self-care deficits   - Assess range of motion  - Assess patient's mobility; develop plan if impaired  - Assess patient's need for assistive devices and provide as appropriate  - Encourage maximum independence but intervene and supervise when necessary  - Involve family in performance of ADLs  - Assess for home care needs following discharge   - Consider OT consult to assist with ADL evaluation and planning for discharge  - Provide patient education as appropriate  Outcome: Progressing  Goal: Maintains/Returns to pre admission functional level  Description: INTERVENTIONS:  - Perform BMAT or MOVE assessment daily    - Set and communicate daily mobility goal to care team and patient/family/caregiver  - Collaborate with rehabilitation services on mobility goals if consulted  - Perform Range of Motion 3  times a day    - Stand patient 3 times a day  - Ambulate patient  3 times a day  - Out of bed to chair  3 times a day   - Out of bed for meals  3  times a day  - Out of bed for toileting  - Record patient progress and toleration of activity level   Outcome: Progressing     Problem: Knowledge Deficit  Goal: Patient/family/caregiver demonstrates understanding of disease process, treatment plan, medications, and discharge instructions  Description: Complete learning assessment and assess knowledge base   Interventions:  - Provide teaching at level of understanding  - Provide teaching via preferred learning methods  Outcome: Progressing     Problem: METABOLIC, FLUID AND ELECTROLYTES - ADULT  Goal: Glucose maintained within target range  Description: INTERVENTIONS:  - Monitor Blood Glucose as ordered  - Assess for signs and symptoms of hyperglycemia and hypoglycemia  - Administer ordered medications to maintain glucose within target range  - Assess nutritional intake and initiate nutrition service referral as needed  Outcome: Progressing     Problem: SKIN/TISSUE INTEGRITY - ADULT  Goal: Incision(s), wounds(s) or drain site(s) healing without S/S of infection  Description: INTERVENTIONS  - Assess and document dressing, incision, wound bed, drain sites and surrounding tissue  - Provide patient and family education  - Perform skin care/dressing changes every shift  Outcome: Progressing     Problem: Nutrition/Hydration-ADULT  Goal: Nutrient/Hydration intake appropriate for improving, restoring or maintaining nutritional needs  Description: Monitor and assess patient's nutrition/hydration status for malnutrition  Collaborate with interdisciplinary team and initiate plan and interventions as ordered  Monitor patient's weight and dietary intake as ordered or per policy  Utilize nutrition screening tool and intervene as necessary  Determine patient's food preferences and provide high-protein, high-caloric foods as appropriate       INTERVENTIONS:  - Monitor oral intake, urinary output, labs, and treatment plans  - Assess nutrition and hydration status and recommend course of action  - Evaluate amount of meals eaten  - Assist patient with eating if necessary   - Allow adequate time for meals  - Recommend/ encourage appropriate diets, oral nutritional supplements, and vitamin/mineral supplements  - Order, calculate, and assess calorie counts as needed  - Assess need for intravenous fluids  - Provide specific nutrition/hydration education as appropriate  - Include patient/family/caregiver in decisions related to nutrition  Outcome: Progressing

## 2022-08-17 NOTE — OP NOTE
OPERATIVE REPORT  PATIENT NAME: Terri Fuentes    :  1975  MRN: [id number]  Pt Location: [facility] OR ROOM 02    SURGERY DATE: 2022    Surgeon(s) and Role:     * Sara Xiao MD - Primary     * Dickson Rocha PA-C - Assisting    Preop Diagnosis:  Cellulitis and abscess of neck [L03 221, L02 11]    Post-Op Diagnosis Codes:     * Cellulitis and abscess of neck [L03 221, L02 11]    Procedure(s) (LRB):  INCISION AND DRAINAGE  (I&D) NECK (N/A)    Specimen(s):  ID Type Source Tests Collected by Time Destination   1 : Neck abcess Tissue Lesion ANAEROBIC CULTURE AND GRAM STAIN, TISSUE EXAM Sara Xiao MD 2022 10:25 AM    2 : Abcess cavity Tissue Lesion TISSUE EXAM Sara Xiao MD 2022 10:32 AM        Estimated Blood Loss:   Minimal    Drains:  * No LDAs found *    Anesthesia Type:   General    Operative Indications:  Cellulitis and abscess of neck [L03 221, L02 11]      Operative Findings:  same    Complications:   None    Procedure and Technique:  The patient was identified by me and placed in the supine position in his stretcher he was now intubated and flipped over onto the operating room table  The area was now shaved and prepped and draped in a normal surgical manner and a time-out was done  I now injected Marcaine with epinephrine all the way around the cm and a half opening which was draining some sebum and pus  I then enlarged the opening with a knife and a Bovie  The initial site of infection was a infected sebaceous cyst   Most of the cyst wall was taken off  I now probed throughout the entire posterior neck both with an ultrasound and then also with a 10 cc syringe and needle and was not able to find any major her abscess  I now used a hemostat to open up the tissue and in each case some pus came out into the wound but again no large abscess    Wound was packed and a dressing applied   I was present for the entire procedure    Patient Disposition:  PACU       SIGNATURE: Errol Larson MD  DATE: August 17, 2022  TIME: 10:47 AM

## 2022-08-18 VITALS
BODY MASS INDEX: 46.65 KG/M2 | DIASTOLIC BLOOD PRESSURE: 75 MMHG | TEMPERATURE: 97.9 F | HEART RATE: 74 BPM | RESPIRATION RATE: 18 BRPM | HEIGHT: 69 IN | WEIGHT: 315 LBS | SYSTOLIC BLOOD PRESSURE: 117 MMHG | OXYGEN SATURATION: 93 %

## 2022-08-18 LAB
ANION GAP SERPL CALCULATED.3IONS-SCNC: 12 MMOL/L (ref 4–13)
BACTERIA WND AEROBE CULT: ABNORMAL
BACTERIA WND AEROBE CULT: ABNORMAL
BASOPHILS # BLD AUTO: 0.01 THOUSANDS/ΜL (ref 0–0.1)
BASOPHILS NFR BLD AUTO: 0 % (ref 0–1)
BUN SERPL-MCNC: 33 MG/DL (ref 5–25)
CALCIUM SERPL-MCNC: 9.1 MG/DL (ref 8.4–10.2)
CHLORIDE SERPL-SCNC: 101 MMOL/L (ref 96–108)
CO2 SERPL-SCNC: 18 MMOL/L (ref 21–32)
CREAT SERPL-MCNC: 1.49 MG/DL (ref 0.6–1.3)
EOSINOPHIL # BLD AUTO: 0 THOUSAND/ΜL (ref 0–0.61)
EOSINOPHIL NFR BLD AUTO: 0 % (ref 0–6)
ERYTHROCYTE [DISTWIDTH] IN BLOOD BY AUTOMATED COUNT: 12.8 % (ref 11.6–15.1)
GFR SERPL CREATININE-BSD FRML MDRD: 54 ML/MIN/1.73SQ M
GLUCOSE SERPL-MCNC: 274 MG/DL (ref 65–140)
GLUCOSE SERPL-MCNC: 289 MG/DL (ref 65–140)
GLUCOSE SERPL-MCNC: 306 MG/DL (ref 65–140)
GLUCOSE SERPL-MCNC: 371 MG/DL (ref 65–140)
GRAM STN SPEC: ABNORMAL
HCT VFR BLD AUTO: 32.9 % (ref 36.5–49.3)
HGB BLD-MCNC: 11.3 G/DL (ref 12–17)
IMM GRANULOCYTES # BLD AUTO: 0.04 THOUSAND/UL (ref 0–0.2)
IMM GRANULOCYTES NFR BLD AUTO: 0 % (ref 0–2)
LYMPHOCYTES # BLD AUTO: 0.56 THOUSANDS/ΜL (ref 0.6–4.47)
LYMPHOCYTES NFR BLD AUTO: 6 % (ref 14–44)
MCH RBC QN AUTO: 31 PG (ref 26.8–34.3)
MCHC RBC AUTO-ENTMCNC: 34.3 G/DL (ref 31.4–37.4)
MCV RBC AUTO: 90 FL (ref 82–98)
MONOCYTES # BLD AUTO: 0.46 THOUSAND/ΜL (ref 0.17–1.22)
MONOCYTES NFR BLD AUTO: 5 % (ref 4–12)
NEUTROPHILS # BLD AUTO: 8.27 THOUSANDS/ΜL (ref 1.85–7.62)
NEUTS SEG NFR BLD AUTO: 89 % (ref 43–75)
NRBC BLD AUTO-RTO: 0 /100 WBCS
PLATELET # BLD AUTO: 152 THOUSANDS/UL (ref 149–390)
PMV BLD AUTO: 9.5 FL (ref 8.9–12.7)
POTASSIUM SERPL-SCNC: 4.2 MMOL/L (ref 3.5–5.3)
RBC # BLD AUTO: 3.64 MILLION/UL (ref 3.88–5.62)
SODIUM SERPL-SCNC: 131 MMOL/L (ref 135–147)
WBC # BLD AUTO: 9.34 THOUSAND/UL (ref 4.31–10.16)

## 2022-08-18 PROCEDURE — 80048 BASIC METABOLIC PNL TOTAL CA: CPT | Performed by: PHYSICIAN ASSISTANT

## 2022-08-18 PROCEDURE — 99232 SBSQ HOSP IP/OBS MODERATE 35: CPT | Performed by: INTERNAL MEDICINE

## 2022-08-18 PROCEDURE — 99024 POSTOP FOLLOW-UP VISIT: CPT | Performed by: PHYSICIAN ASSISTANT

## 2022-08-18 PROCEDURE — 85025 COMPLETE CBC W/AUTO DIFF WBC: CPT | Performed by: PHYSICIAN ASSISTANT

## 2022-08-18 PROCEDURE — NC001 PR NO CHARGE: Performed by: INTERNAL MEDICINE

## 2022-08-18 PROCEDURE — 82948 REAGENT STRIP/BLOOD GLUCOSE: CPT

## 2022-08-18 PROCEDURE — 99239 HOSP IP/OBS DSCHRG MGMT >30: CPT | Performed by: INTERNAL MEDICINE

## 2022-08-18 RX ORDER — INSULIN LISPRO 100 [IU]/ML
5 INJECTION, SOLUTION INTRAVENOUS; SUBCUTANEOUS
Status: DISCONTINUED | OUTPATIENT
Start: 2022-08-18 | End: 2022-08-18 | Stop reason: HOSPADM

## 2022-08-18 RX ORDER — INSULIN GLARGINE 100 [IU]/ML
20 INJECTION, SOLUTION SUBCUTANEOUS
Status: DISCONTINUED | OUTPATIENT
Start: 2022-08-18 | End: 2022-08-18 | Stop reason: HOSPADM

## 2022-08-18 RX ORDER — INSULIN LISPRO 100 [IU]/ML
4 INJECTION, SOLUTION INTRAVENOUS; SUBCUTANEOUS
Qty: 3 ML | Refills: 0 | Status: SHIPPED | OUTPATIENT
Start: 2022-08-18

## 2022-08-18 RX ORDER — INSULIN GLARGINE 100 [IU]/ML
20 INJECTION, SOLUTION SUBCUTANEOUS
Qty: 10 ML | Refills: 0 | Status: SHIPPED | OUTPATIENT
Start: 2022-08-18

## 2022-08-18 RX ORDER — CEFAZOLIN SODIUM 2 G/50ML
2000 SOLUTION INTRAVENOUS EVERY 8 HOURS
Status: DISCONTINUED | OUTPATIENT
Start: 2022-08-18 | End: 2022-08-18 | Stop reason: HOSPADM

## 2022-08-18 RX ORDER — CEPHALEXIN 500 MG/1
1000 CAPSULE ORAL EVERY 6 HOURS SCHEDULED
Qty: 112 CAPSULE | Refills: 0 | Status: SHIPPED | OUTPATIENT
Start: 2022-08-18 | End: 2022-09-01

## 2022-08-18 RX ADMIN — CEFAZOLIN SODIUM 2000 MG: 2 SOLUTION INTRAVENOUS at 09:14

## 2022-08-18 RX ADMIN — SPIRONOLACTONE 25 MG: 25 TABLET ORAL at 08:17

## 2022-08-18 RX ADMIN — LOSARTAN POTASSIUM 100 MG: 50 TABLET, FILM COATED ORAL at 08:17

## 2022-08-18 RX ADMIN — HYDROCHLOROTHIAZIDE 25 MG: 25 TABLET ORAL at 08:17

## 2022-08-18 RX ADMIN — HEPARIN SODIUM 5000 UNITS: 5000 INJECTION INTRAVENOUS; SUBCUTANEOUS at 05:45

## 2022-08-18 RX ADMIN — AMLODIPINE BESYLATE 5 MG: 5 TABLET ORAL at 08:17

## 2022-08-18 RX ADMIN — DOXAZOSIN 4 MG: 4 TABLET ORAL at 08:17

## 2022-08-18 RX ADMIN — METOPROLOL TARTRATE 50 MG: 50 TABLET, FILM COATED ORAL at 08:17

## 2022-08-18 RX ADMIN — ACETAMINOPHEN 650 MG: 325 TABLET, FILM COATED ORAL at 02:51

## 2022-08-18 RX ADMIN — INSULIN LISPRO 6 UNITS: 100 INJECTION, SOLUTION INTRAVENOUS; SUBCUTANEOUS at 08:16

## 2022-08-18 RX ADMIN — HEPARIN SODIUM 5000 UNITS: 5000 INJECTION INTRAVENOUS; SUBCUTANEOUS at 14:29

## 2022-08-18 RX ADMIN — INSULIN LISPRO 8 UNITS: 100 INJECTION, SOLUTION INTRAVENOUS; SUBCUTANEOUS at 12:40

## 2022-08-18 RX ADMIN — VANCOMYCIN HYDROCHLORIDE 2000 MG: 1 INJECTION, POWDER, LYOPHILIZED, FOR SOLUTION INTRAVENOUS at 04:53

## 2022-08-18 RX ADMIN — HYDRALAZINE HYDROCHLORIDE 50 MG: 25 TABLET, FILM COATED ORAL at 08:17

## 2022-08-18 NOTE — ASSESSMENT & PLAN NOTE
Lab Results   Component Value Date    HGBA1C 10 1 (H) 08/15/2022       Recent Labs     08/17/22  1633 08/17/22  2125 08/18/22  0718 08/18/22  1235   POCGLU 462* 484* 274* 306*       Blood Sugar Average: Last 72 hrs:  (P) 79 [id number]   · Check HgbA1c  · Hyperglycemia 374 on admission, given 8U regular insulin in the ED   · PTA regimen: metformin and Trulicity although patient states he has not been using Trulicity because he thought that was a reflux medication   · Inpatient regimen: SSI with accuchecks- adjust as necessary   · Patient states his glucometer broke, he will need new supplies at discharge  · Avoid hypoglycemia   · bbg is uncontrolled , will,add lantus and novolog insulin and monitor bbg

## 2022-08-18 NOTE — QUICK NOTE
Patient seen and examined  He has markedly better range of motion and the swelling, at least on the left side is markedly reduced    Dressing change later today

## 2022-08-18 NOTE — PLAN OF CARE
Problem: Potential for Falls  Goal: Patient will remain free of falls  Description: INTERVENTIONS:  - Educate patient/family on patient safety including physical limitations  - Instruct patient to call for assistance with activity   - Consult OT/PT to assist with strengthening/mobility   - Keep Call bell within reach  - Keep bed low and locked with side rails adjusted as appropriate  - Keep care items and personal belongings within reach  - Initiate and maintain comfort rounds  - Offer Toileting every 2  Hours, in advance of need  Outcome: Progressing     Problem: PAIN - ADULT  Goal: Verbalizes/displays adequate comfort level or baseline comfort level  Description: Interventions:  - Encourage patient to monitor pain and request assistance  - Assess pain using appropriate pain scale  - Administer analgesics based on type and severity of pain and evaluate response  - Implement non-pharmacological measures as appropriate and evaluate response  - Consider cultural and social influences on pain and pain management  - Notify physician/advanced practitioner if interventions unsuccessful or patient reports new pain  Outcome: Progressing     Problem: INFECTION - ADULT  Goal: Absence or prevention of progression during hospitalization  Description: INTERVENTIONS:  - Assess and monitor for signs and symptoms of infection  - Monitor lab/diagnostic results  - Administer medications as ordered  - Instruct and encourage patient and family to use good hand hygiene technique  - Identify and instruct in appropriate isolation precautions for identified infection/condition  Outcome: Progressing  Goal: Absence of fever/infection during neutropenic period  Description: INTERVENTIONS:  - Monitor WBC    Outcome: Progressing     Problem: SAFETY ADULT  Goal: Patient will remain free of falls  Description: INTERVENTIONS:  - Educate patient/family on patient safety including physical limitations  - Instruct patient to call for assistance with activity   - Consult OT/PT to assist with strengthening/mobility   - Keep Call bell within reach  - Keep bed low and locked with side rails adjusted as appropriate  - Keep care items and personal belongings within reach  - Initiate and maintain comfort rounds  - Offer Toileting every 2 Hours, in advance of need  Outcome: Progressing  Goal: Maintain or return to baseline ADL function  Description: INTERVENTIONS:  -  Assess patient's ability to carry out ADLs; assess patient's baseline for ADL function and identify physical deficits which impact ability to perform ADLs (bathing, care of mouth/teeth, toileting, grooming, dressing, etc )  - Assess/evaluate cause of self-care deficits   - Assess range of motion  - Assess patient's mobility; develop plan if impaired  - Assess patient's need for assistive devices and provide as appropriate  - Encourage maximum independence but intervene and supervise when necessary  - Involve family in performance of ADLs  - Assess for home care needs following discharge   - Consider OT consult to assist with ADL evaluation and planning for discharge  - Provide patient education as appropriate  Outcome: Progressing  Goal: Maintains/Returns to pre admission functional level  Description: INTERVENTIONS:  - Perform BMAT or MOVE assessment daily    - Set and communicate daily mobility goal to care team and patient/family/caregiver  - Collaborate with rehabilitation services on mobility goals if consulted  - Perform Range of Motion 3  times a day    - Dangle patient 3  times a day  - Stand patient  3  times a day  - Ambulate patient  3  times a day  - Out of bed to chair 3  times a day   - Out of bed for meals 3  times a day  - Out of bed for toileting  - Record patient progress and toleration of activity level   Outcome: Progressing     Problem: DISCHARGE PLANNING  Goal: Discharge to home or other facility with appropriate resources  Description: INTERVENTIONS:  - Identify barriers to discharge w/patient and caregiver  - Arrange for needed discharge resources and transportation as appropriate  - Identify discharge learning needs (meds, wound care, etc )  - Arrange for interpretive services to assist at discharge as needed  - Refer to Case Management Department for coordinating discharge planning if the patient needs post-hospital services based on physician/advanced practitioner order or complex needs related to functional status, cognitive ability, or social support system  Outcome: Progressing     Problem: Knowledge Deficit  Goal: Patient/family/caregiver demonstrates understanding of disease process, treatment plan, medications, and discharge instructions  Description: Complete learning assessment and assess knowledge base  Interventions:  - Provide teaching at level of understanding  - Provide teaching via preferred learning methods  Outcome: Progressing     Problem: METABOLIC, FLUID AND ELECTROLYTES - ADULT  Goal: Glucose maintained within target range  Description: INTERVENTIONS:  - Monitor Blood Glucose as ordered  - Assess for signs and symptoms of hyperglycemia and hypoglycemia  - Administer ordered medications to maintain glucose within target range  - Assess nutritional intake and initiate nutrition service referral as needed  Outcome: Progressing     Problem: SKIN/TISSUE INTEGRITY - ADULT  Goal: Incision(s), wounds(s) or drain site(s) healing without S/S of infection  Description: INTERVENTIONS  - Assess and document dressing, incision, wound bed, drain sites and surrounding tissue  - Provide patient and family education  - Perform skin care/dressing changes every shift  Outcome: Progressing     Problem: Nutrition/Hydration-ADULT  Goal: Nutrient/Hydration intake appropriate for improving, restoring or maintaining nutritional needs  Description: Monitor and assess patient's nutrition/hydration status for malnutrition   Collaborate with interdisciplinary team and initiate plan and interventions as ordered  Monitor patient's weight and dietary intake as ordered or per policy  Utilize nutrition screening tool and intervene as necessary  Determine patient's food preferences and provide high-protein, high-caloric foods as appropriate       INTERVENTIONS:  - Monitor oral intake, urinary output, labs, and treatment plans  - Assess nutrition and hydration status and recommend course of action  - Evaluate amount of meals eaten  - Assist patient with eating if necessary   - Allow adequate time for meals  - Recommend/ encourage appropriate diets, oral nutritional supplements, and vitamin/mineral supplements  - Order, calculate, and assess calorie counts as needed  - Assess need for intravenous fluids  - Provide specific nutrition/hydration education as appropriate  - Include patient/family/caregiver in decisions related to nutrition  Outcome: Progressing

## 2022-08-18 NOTE — PROGRESS NOTES
Progress Note - Infectious Disease   Aquilino Fuentes 48 y o  male MRN: [id number]  Unit/Bed#: -01 Encounter: [id number]      Impression/Plan:  1   MSSA abscess posterior neck with associated cellulitis  Blood cultures negative to date  Wound cxs MSSA and deep OR cx staph aureus prelim too  S/p 8/17/22 OR I&D  -continues high dose cefazolin at present in setting #1/2  -follow-up cultures and adjust antibiotics tailored toward sensitivities  -monitor temperature and hemodynamics  -local wound care per surgery    2  Staph lugdunensis bacteremia  Admission blood culture 1 of 2 sets growing staph lugdunesis  No hardware or endovascular devices  -antibiotics as above  -follow up final blood cultures  3  Type 2 diabetes with hyperglycemia  Risk factor for infection   -antibiotics/workup as above  -blood glucose management per primary care team  -serial exam  -close surgical follow-up  -recheck CBC and BMP      4   Renal insufficiency, unclear acute versus chronic  Admission Creatinine 1 62 > 1 49  Unknown baseline  -renal dose adjust antibiotic as needed  -volume management   -recheck BMP     5  Morbid Obesity  BMI 49K  Risk factor for infection and impaired wound healing   -dietary lifestyle modifications for primary care team     Antibiotics:  Cefazolin - abx D4     I have discussed the above management plan in detail with patient, RN, microbiologist and the primary service  I spent 35 minutes on the unit floor of which 20 minutes was in counseling/coordination of care as outlined in my note including coordination of outpatient antibiotic options as patient requesting discharge by this evening  Subjective:  Patient has no fever, chills, sweats; no nausea, vomiting, diarrhea;cough, shortness of breath; no pain at neck abscess site  No new symptoms  Patient anxious to be discharged home  He is very concerned about his [employer] business   If he does not get back to work tonight, he is losing money and will be unable to pay his rent  Objective:  Vitals:  Temp:  [98 3 °F (36 8 °C)-99 1 °F (37 3 °C)] 98 3 °F (36 8 °C)  HR:  [2-88] 75  Resp:  [18-20] 20  BP: (109-148)/(72-92) 147/92  SpO2:  [94 %-95 %] 94 %  Temp (24hrs), Av 7 °F (37 1 °C), Min:98 3 °F (36 8 °C), Max:99 1 °F (37 3 °C)  Current: Temperature: 98 3 °F (36 8 °C)    Physical Exam:   General Appearance:  Alert, interactive, nontoxic, no acute distress, sitting propped in chair  Throat: Oropharynx moist without lesions  Neck: Supple, posterior ABD dressing intact with mild serosanguineous strike through drainage, no spreading erythema or induration past dressing  Lungs:   Clear to auscultation bilaterally; no wheezes, rhonchi or rales; respirations unlabored   Heart:  RRR; no murmur   Abdomen:   Soft, non-tender, protuberant pannus, positive bowel sounds  Extremities: No clubbing, cyanosis or edema   : No sal[place], no SPT   Skin: No new rashes or lesions  IV site nontender       Labs, Imaging, & Other studies:   All pertinent labs and imaging studies were personally reviewed  Results from last 7 days   Lab Units 22  0451 22  0613 08/15/22  1707   WBC Thousand/uL 9 34 9 43 7 96   HEMOGLOBIN g/dL 11 3* 12 0 13 2   PLATELETS Thousands/uL 152 130* 144*     Results from last 7 days   Lab Units 22  0451 22  0613 08/15/22  1707   SODIUM mmol/L 131* 132* 129*   POTASSIUM mmol/L 4 2 3 4* 3 7   CHLORIDE mmol/L 101 101 96   CO2 mmol/L 18* 18* 20*   BUN mg/dL 33* 25 29*   CREATININE mg/dL 1 49* 1 51* 1 62*   EGFR ml/min/1 73sq m 54 53 49   CALCIUM mg/dL 9 1 9 0 9 8   AST U/L  --   --  26   ALT U/L  --   --  32   ALK PHOS U/L  --   --  66     Results from last 7 days   Lab Units 22  1444 22  1210 08/15/22  1707   BLOOD CULTURE   --   --  Staphylococcus lugdunensis*  No Growth at 48 hrs     GRAM STAIN RESULT  No Polys or Bacteria seen 1+ Polys*  Rare Gram positive cocci in pairs* Gram positive cocci in clusters*  No Polys*  1+ Gram positive cocci in pairs*   WOUND CULTURE  1+ Growth of Staphylococcus aureus* 2+ Growth of Staphylococcus aureus* 2+ Growth of Staphylococcus aureus*

## 2022-08-18 NOTE — ASSESSMENT & PLAN NOTE
Lab Results   Component Value Date    HGBA1C 10 1 (H) 08/15/2022       Recent Labs     08/17/22  2125 08/18/22  0718 08/18/22  1235 08/18/22  1550   POCGLU 484* 274* 306* 371*       Blood Sugar Average: Last 72 hrs:  (P) 543 [id number]   · Check HgbA1c  · Hyperglycemia 374 on admission, given 8U regular insulin in the ED   · PTA regimen: metformin and Trulicity although patient states he has not been using Trulicity because he thought that was a reflux medication   · Inpatient regimen: SSI with accuchecks- adjust as necessary   · Patient states his glucometer broke, he will need new supplies at discharge  · Avoid hypoglycemia   · bbg is uncontrolled , will,add lantus and novolog insulin and monitor bbg

## 2022-08-18 NOTE — DISCHARGE SUMMARY
[address]  64   Discharge- [facility] 1975, 48 [facility]  male MRN: [id number]  Unit/Bed#: -[place] Encounter: [id number]  Primary Care Provider: Elzbieta Cornell MD   Date and time admitted to hospital: 8/15/2022  3:54 PM    Type 2 diabetes mellitus [facility])  Assessment & Plan  Lab Results   Component Value Date    HGBA1C 10 1 (H) 08/15/2022       Recent Labs     08/17/22  2125 08/18/22  0718 08/18/22  1235 08/18/22  1550   POCGLU 484* 274* 306* 371*       Blood Sugar Average: Last 72 hrs:  (P) 736 [id number]   · Check HgbA1c  · Hyperglycemia 374 on admission, given 8U regular insulin in the ED   · PTA regimen: metformin and Trulicity although patient states he has not been using Trulicity because he thought that was a reflux medication   · Inpatient regimen: SSI with accuchecks- adjust as necessary   · Patient states his glucometer broke, he will need new supplies at discharge  · Avoid hypoglycemia   · bbg is uncontrolled , will,add lantus and novolog insulin and monitor bbg     Elevated serum creatinine  Assessment & Plan  · Creatinine 1 62 with GFR 49 on admission   · No prior labs available for review, unknown baseline   · IVF    · I/O  · Avoid nephrotoxins/hypotension   · Continue to trend Cr     Hyponatremia  Assessment & Plan  · Pseudohyponatremia in setting of hyperglycemia   · Corrected sodium for hyperglycemia 133   · Will continue HCTZ for now due to hypertension, if sodium worsens despite correcting hyperglycemia, consider holding    Hyperlipidemia  Assessment & Plan  Continue statin     SANJIV (obstructive sleep apnea)  Assessment & Plan  · Continue BiPAP qhs as tolerated with pain, patient with right suboccipital cellulitis with abscess    Hypertension  Assessment & Plan  · BP elevated on admission 171/84  · Continue PTA antihypertensives: doxazosin, hydralazine, Norvasc, HCTZ, losartan, spironolactone, lopressor     Morbid obesity (HCC)  Assessment & Plan  · BMI 48 73  · Encourage lifestyle modification/weight loss   · Nutrition consult     * Cellulitis and abscess of neck  Assessment & Plan  Patient presents with 2 days of right posterior neck pain, erythema, drainage  States that he believes its from his BiPAP straps at home  · CT soft tissue neck w/ contrast: deep and superficial soft tissue cellulitis in the right suboccipital region  Superficial subcutaneous 6mm abscess   · Currently afebrile and without leukocytosis  Hemodynamically stable  · Lactic acid 2 1, repeat in 2h     · NPO at midnight for abscess drainage tomorrow   · IVF   · IV abx: Cefazolin + Vancomycin   · General surgery consult appreciated                    Medical Problems             Resolved Problems  Date Reviewed: 8/18/2022   None                 Admission Date:   Admission Orders (From admission, onward)     Ordered        08/15/22 [address]  Once                        Admitting Diagnosis: Cellulitis and abscess of neck [L03 221, L02 11]  Abscess [L02 91]  Uncontrolled type 2 diabetes mellitus with hyperglycemia ([facility] [address] ) [E11 65]    HPI:      Procedures Performed: No orders of the defined types were placed in this encounter  Summary of Hospital Course:   Patient presented with posterior neck cellulitis with abscess secondary to the straps of the CPAP irritating the neck, cultures were growing MSSA status post OR and I and D on 08/17, also noted to have coagulase negative Staph bacteremia 1 of 2 sets from the admission, also has diabetes mellitus type 2 with hyperglycemia and elevated A1c of 10 1  Patient was evaluated by General surgery and also Infectious Disease specialist   One patient has been symptomatically better, although OR cultures preliminary show MSSA same organism as of 0 initial wound culture    One of 2 sets of blood cultures from admission is now showing Staphylococcus ludgensis  likely contaminant however patient is very adamant that he would 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  Patient understands that the possibility the blood culture may be real and he may have to return back however at this moment most likely is contaminant and discussed the plan with Infectious Disease specialist and he understands and he would be discharged on Keflex 1 g q 6 hour for 2 weeks  Patient will be discharged on insulin Lantus and NovoLog with meals patient to follow-up with his PCP and plan discussed with Dr Charisma No Findings, Care, Treatment and Services Provided:  Id, General surgery, I and D    Complications: nil    Condition at Discharge: good         Discharge instructions/Information to patient and family:   See after visit summary for information provided to patient and family  Provisions for Follow-Up Care:  See after visit summary for information related to follow-up care and any pertinent home health orders  PCP: Jesus Rick MD    Disposition: Home    Planned Readmission: No    Discharge Statement   I spent 35 minutes discharging the patient  This time was spent on the day of discharge  I had direct contact with the patient on the day of discharge  Additional documentation is required if more than 30 minutes were spent on discharge  Discharge Medications:  See after visit summary for reconciled discharge medications provided to patient and family

## 2022-08-18 NOTE — PROGRESS NOTES
[address]  Progress Note Eran Meyers [facility] 1975, 48 y o  male MRN: [id number]  Unit/Bed#: -01 Encounter: [id number]  Primary Care Provider: Ralph Gama MD   Date and time admitted to hospital: 8/15/2022  3:54 PM    Type 2 diabetes mellitus [facility])  Assessment & Plan  Lab Results   Component Value Date    HGBA1C 10 1 (H) 08/15/2022       Recent Labs     08/17/22  1633 08/17/22  2125 08/18/22  0718 08/18/22  1235   POCGLU 462* 484* 274* 306*       Blood Sugar Average: Last 72 hrs:  (P) 731 [id number]   · Check HgbA1c  · Hyperglycemia 374 on admission, given 8U regular insulin in the ED   · PTA regimen: metformin and Trulicity although patient states he has not been using Trulicity because he thought that was a reflux medication   · Inpatient regimen: SSI with accuchecks- adjust as necessary   · Patient states his glucometer broke, he will need new supplies at discharge  · Avoid hypoglycemia   · bbg is uncontrolled , will,add lantus and novolog insulin and monitor bbg     Elevated serum creatinine  Assessment & Plan  · Creatinine 1 62 with GFR 49 on admission   · No prior labs available for review, unknown baseline   · IVF    · I/O  · Avoid nephrotoxins/hypotension   · Continue to trend Cr     Hyponatremia  Assessment & Plan  · Pseudohyponatremia in setting of hyperglycemia   · Corrected sodium for hyperglycemia 133   · Will continue HCTZ for now due to hypertension, if sodium worsens despite correcting hyperglycemia, consider holding    Hyperlipidemia  Assessment & Plan  Continue statin     SANJIV (obstructive sleep apnea)  Assessment & Plan  · Continue BiPAP qhs as tolerated with pain, patient with right suboccipital cellulitis with abscess    Hypertension  Assessment & Plan  · BP elevated on admission 171/84  · Continue PTA antihypertensives: doxazosin, hydralazine, Norvasc, HCTZ, losartan, spironolactone, lopressor     Morbid obesity (HCC)  Assessment & Plan  · BMI 48 73  · Encourage lifestyle modification/weight loss   · Nutrition consult     * Cellulitis and abscess of neck  Assessment & Plan  Patient presents with 2 days of right posterior neck pain, erythema, drainage  States that he believes its from his BiPAP straps at home  · CT soft tissue neck w/ contrast: deep and superficial soft tissue cellulitis in the right suboccipital region  Superficial subcutaneous 6mm abscess   · Currently afebrile and without leukocytosis  Hemodynamically stable  · Lactic acid 2 1, repeat in 2h     · NPO at midnight for abscess drainage tomorrow   · IVF   · IV abx: Cefazolin + Vancomycin   · General surgery consult appreciated  ·             Subjective/Objective     Subjective:   Patient has pain and swelling in the posterior aspect of the neck  Awaiting repeat cultures  Denies of any fever or chills  Has no abdominal pain or nausea or vomiting patient has elevated blood glucose    Objective:  Vitals: Blood pressure 147/92, pulse 75, temperature 98 3 °F (36 8 °C), temperature source Oral, resp  rate 20, height 5' 9" (1 753 m), weight (!) 151 kg (332 lb 14 3 oz), SpO2 94 %  ,Body mass index is 49 16 kg/m²        Intake/Output Summary (Last 24 hours) at 8/18/2022 1510  Last data filed at 8/18/2022 1200  Gross per 24 hour   Intake 1180 ml   Output --   Net 1180 ml       Invasive Devices  Report    Peripheral Intravenous Line  Duration           Peripheral IV 08/15/22 Left;Ventral (anterior) Forearm 3 days    Peripheral IV 08/15/22 Left Antecubital 2 days                Physical Exam:   HEENT-PERRLA, moist oral mucosa  Neck-supple, no JVD elevation , posterior aspect of the neck is bandaged  Respiratory-equal air entry bilaterally, no rales or rhonchi  Cardiovascular system-S1, S2 heard, no murmur or gallops or rubs  Abdomen-soft, nontender, no guarding or rigidity, bowel sounds heard  Extremities-no pedal edema  Peripheral pulses palpable  Musculoskeletal-no contractures  Central nervous system-no acute focal neurological deficit ,no sensory or motor deficit noted  Skin-no rash noted      Lab, Imaging and other studies: I have personally reviewed pertinent reports      VTE Pharmacologic Prophylaxis: Heparin  VTE Mechanical Prophylaxis: sequential compression device

## 2022-08-18 NOTE — PROGRESS NOTES
Progress Note - General Surgery   Marci Fuentes 48 y o  male MRN: [id number]  Unit/Bed#: -01 Encounter: [id number]    Assessment/Plan    47 y/o male with posterior neck abscess s/p I&D  Pt reports that his pain and ROM are much improved s/p I&D  Pain controlled adequately with Tylenol  Improvement in induration/swelling/erythema and ROM on exam  AFVSS on RA  WBC 9 34  Wound cultures pending    Continue cefazolin per ID - appreciate recommendations  F/U wound culture results  Packing removed this afternoon; pt instructed to shower and let water rinse the wound thoroughly, then RN will apply new dressing per orders (also discussed with RN directly)  Pt very anxious for discharge, however probably not medically advised at this time - discharge planning at discretion of SLIM/ID  Case discussed with attending and primary team      /92 (BP Location: Right arm)   Pulse 75   Temp 98 3 °F (36 8 °C) (Oral)   Resp 20   Ht 5' 9" (1 753 m)   Wt (!) 151 kg (332 lb 14 3 oz)   SpO2 94%   BMI 49 16 kg/m²     Labs in chart were reviewed  Results from last 7 days   Lab Units 08/18/22  0451   WBC Thousand/uL 9 34   HEMOGLOBIN g/dL 11 3*   HEMATOCRIT % 32 9*   PLATELETS Thousands/uL 152     Results from last 7 days   Lab Units 08/18/22  0451   POTASSIUM mmol/L 4 2   CHLORIDE mmol/L 101   CO2 mmol/L 18*   BUN mg/dL 33*   CREATININE mg/dL 1 49*           Intake/Output Summary (Last 24 hours) at 8/18/2022 1428  Last data filed at 8/18/2022 1200  Gross per 24 hour   Intake 1180 ml   Output --   Net 1180 ml         Subjective/Objective     Subjective: Pt seen and examined  No acute overnight events  Pt doing much better today, reporting improved pain and ROM of neck  Packing removed at bedside with assistance of RN  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  Review of Systems   Constitutional: Negative for chills and fever     Respiratory: Negative for cough and shortness of breath  Cardiovascular: Negative for chest pain and leg swelling  Gastrointestinal: Negative for abdominal pain, nausea and vomiting  Genitourinary: Negative for difficulty urinating and dysuria  Skin: Positive for wound  Objective:     Physical Exam  Vitals and nursing note reviewed  Constitutional:       General: He is not in acute distress  Appearance: He is obese  He is not toxic-appearing  Eyes:      Extraocular Movements: Extraocular movements intact  Conjunctiva/sclera: Conjunctivae normal    Neck:      Comments: Posterior neck wound s/p I&D, packing removed, surrounding area with improvement in swelling/erythema/induration compared to yesterday  Pulmonary:      Effort: Pulmonary effort is normal  No respiratory distress  Musculoskeletal:         General: Normal range of motion  Cervical back: Normal range of motion  Tenderness (improving) present  No rigidity  Right lower leg: No edema  Left lower leg: No edema  Lymphadenopathy:      Cervical: No cervical adenopathy  Skin:     General: Skin is warm and dry  Neurological:      Mental Status: He is alert and oriented to person, place, and time  Psychiatric:         Mood and Affect: Mood normal          Behavior: Behavior normal          Thought Content:  Thought content normal             Юлия Cisneros PA-C  8/18/2022

## 2022-08-18 NOTE — PLAN OF CARE
Problem: Potential for Falls  Goal: Patient will remain free of falls  Description: INTERVENTIONS:  - Educate patient/family on patient safety including physical limitations  - Instruct patient to call for assistance with activity   - Consult OT/PT to assist with strengthening/mobility   - Keep Call bell within reach  - Keep bed low and locked with side rails adjusted as appropriate  - Keep care items and personal belongings within reach  - Initiate and maintain comfort rounds  - Make Fall Risk Sign visible to staff  - Offer Toileting every 2 Hours, in advance of need  - Initiate/Maintain alarm  - Obtain necessary fall risk management equipment  - Apply yellow socks and bracelet for high fall risk patients  - Consider moving patient to room near nurses station  Outcome: Progressing     Problem: PAIN - ADULT  Goal: Verbalizes/displays adequate comfort level or baseline comfort level  Description: Interventions:  - Encourage patient to monitor pain and request assistance  - Assess pain using appropriate pain scale  - Administer analgesics based on type and severity of pain and evaluate response  - Implement non-pharmacological measures as appropriate and evaluate response  - Consider cultural and social influences on pain and pain management  - Notify physician/advanced practitioner if interventions unsuccessful or patient reports new pain  Outcome: Progressing     Problem: INFECTION - ADULT  Goal: Absence or prevention of progression during hospitalization  Description: INTERVENTIONS:  - Assess and monitor for signs and symptoms of infection  - Monitor lab/diagnostic results  - Monitor all insertion sites, i e  indwelling lines, tubes, and drains  - Monitor endotracheal if appropriate and nasal secretions for changes in amount and color  - [place] appropriate cooling/warming therapies per order  - Administer medications as ordered  - Instruct and encourage patient and family to use good hand hygiene technique  - Identify and instruct in appropriate isolation precautions for identified infection/condition  Outcome: Progressing  Goal: Absence of fever/infection during neutropenic period  Description: INTERVENTIONS:  - Monitor WBC    Outcome: Progressing     Problem: SAFETY ADULT  Goal: Patient will remain free of falls  Description: INTERVENTIONS:  - Educate patient/family on patient safety including physical limitations  - Instruct patient to call for assistance with activity   - Consult OT/PT to assist with strengthening/mobility   - Keep Call bell within reach  - Keep bed low and locked with side rails adjusted as appropriate  - Keep care items and personal belongings within reach  - Initiate and maintain comfort rounds  - Make Fall Risk Sign visible to staff  - Offer Toileting every 2 Hours, in advance of need  - Initiate/Maintain alarm  - Obtain necessary fall risk management equipment  - Apply yellow socks and bracelet for high fall risk patients  - Consider moving patient to room near nurses station  Outcome: Progressing  Goal: Maintain or return to baseline ADL function  Description: INTERVENTIONS:  -  Assess patient's ability to carry out ADLs; assess patient's baseline for ADL function and identify physical deficits which impact ability to perform ADLs (bathing, care of mouth/teeth, toileting, grooming, dressing, etc )  - Assess/evaluate cause of self-care deficits   - Assess range of motion  - Assess patient's mobility; develop plan if impaired  - Assess patient's need for assistive devices and provide as appropriate  - Encourage maximum independence but intervene and supervise when necessary  - Involve family in performance of ADLs  - Assess for home care needs following discharge   - Consider OT consult to assist with ADL evaluation and planning for discharge  - Provide patient education as appropriate  Outcome: Progressing  Goal: Maintains/Returns to pre admission functional level  Description: INTERVENTIONS:  - Perform BMAT or MOVE assessment daily    - Set and communicate daily mobility goal to care team and patient/family/caregiver  - Collaborate with rehabilitation services on mobility goals if consulted  - Perform Range of Motion 2 times a day  - Reposition patient every 2 hours  - Dangle patient 2 times a day  - Stand patient 2 times a day  - Ambulate patient 2 times a day  - Out of bed to chair 2 times a day   - Out of bed for meals 2 times a day  - Out of bed for toileting  - Record patient progress and toleration of activity level   Outcome: Progressing     Problem: DISCHARGE PLANNING  Goal: Discharge to home or other facility with appropriate resources  Description: INTERVENTIONS:  - Identify barriers to discharge w/patient and caregiver  - Arrange for needed discharge resources and transportation as appropriate  - Identify discharge learning needs (meds, wound care, etc )  - Arrange for interpretive services to assist at discharge as needed  - Refer to Case Management Department for coordinating discharge planning if the patient needs post-hospital services based on physician/advanced practitioner order or complex needs related to functional status, cognitive ability, or social support system  Outcome: Progressing     Problem: Knowledge Deficit  Goal: Patient/family/caregiver demonstrates understanding of disease process, treatment plan, medications, and discharge instructions  Description: Complete learning assessment and assess knowledge base    Interventions:  - Provide teaching at level of understanding  - Provide teaching via preferred learning methods  Outcome: Progressing     Problem: METABOLIC, FLUID AND ELECTROLYTES - ADULT  Goal: Glucose maintained within target range  Description: INTERVENTIONS:  - Monitor Blood Glucose as ordered  - Assess for signs and symptoms of hyperglycemia and hypoglycemia  - Administer ordered medications to maintain glucose within target range  - Assess nutritional intake and initiate nutrition service referral as needed  Outcome: Progressing     Problem: SKIN/TISSUE INTEGRITY - ADULT  Goal: Incision(s), wounds(s) or drain site(s) healing without S/S of infection  Description: INTERVENTIONS  - Assess and document dressing, incision, wound bed, drain sites and surrounding tissue  - Provide patient and family education  - Perform skin care/dressing changes  Outcome: Progressing     Problem: Nutrition/Hydration-ADULT  Goal: Nutrient/Hydration intake appropriate for improving, restoring or maintaining nutritional needs  Description: Monitor and assess patient's nutrition/hydration status for malnutrition  Collaborate with interdisciplinary team and initiate plan and interventions as ordered  Monitor patient's weight and dietary intake as ordered or per policy  Utilize nutrition screening tool and intervene as necessary  Determine patient's food preferences and provide high-protein, high-caloric foods as appropriate       INTERVENTIONS:  - Monitor oral intake, urinary output, labs, and treatment plans  - Assess nutrition and hydration status and recommend course of action  - Evaluate amount of meals eaten  - Assist patient with eating if necessary   - Allow adequate time for meals  - Recommend/ encourage appropriate diets, oral nutritional supplements, and vitamin/mineral supplements  - Order, calculate, and assess calorie counts as needed  - Recommend, monitor, and adjust tube feedings and TPN/PPN based on assessed needs  - Assess need for intravenous fluids  - Provide specific nutrition/hydration education as appropriate  - Include patient/family/caregiver in decisions related to nutrition  Outcome: Progressing

## 2022-08-19 LAB
BACTERIA BLD CULT: ABNORMAL
BACTERIA BLD CULT: ABNORMAL
BACTERIA SPEC ANAEROBE CULT: NORMAL
GRAM STN SPEC: ABNORMAL
S LUGDUNENSIS DNA BLD POS QL NAA+NON-PRB: DETECTED

## 2022-08-20 LAB — BACTERIA BLD CULT: NORMAL

## 2022-08-21 LAB
BACTERIA WND AEROBE CULT: ABNORMAL
BACTERIA WND AEROBE CULT: ABNORMAL
GRAM STN SPEC: ABNORMAL

## 2022-11-05 ENCOUNTER — APPOINTMENT (OUTPATIENT)
Dept: LAB | Facility: HOSPITAL | Age: 47
End: 2022-11-05
Attending: INTERNAL MEDICINE

## 2022-11-05 DIAGNOSIS — L03.221 CELLULITIS AND ABSCESS OF NECK: ICD-10-CM

## 2022-11-05 DIAGNOSIS — L02.11 CELLULITIS AND ABSCESS OF NECK: ICD-10-CM

## 2022-11-05 LAB
ALBUMIN SERPL BCP-MCNC: 4.2 G/DL (ref 3.5–5)
ALP SERPL-CCNC: 71 U/L (ref 34–104)
ALT SERPL W P-5'-P-CCNC: 32 U/L (ref 7–52)
ANION GAP SERPL CALCULATED.3IONS-SCNC: 11 MMOL/L (ref 4–13)
AST SERPL W P-5'-P-CCNC: 27 U/L (ref 13–39)
BASOPHILS # BLD AUTO: 0.04 THOUSANDS/ÂΜL (ref 0–0.1)
BASOPHILS NFR BLD AUTO: 1 % (ref 0–1)
BILIRUB SERPL-MCNC: 0.32 MG/DL (ref 0.2–1)
BUN SERPL-MCNC: 34 MG/DL (ref 5–25)
CALCIUM SERPL-MCNC: 9.5 MG/DL (ref 8.4–10.2)
CHLORIDE SERPL-SCNC: 98 MMOL/L (ref 96–108)
CO2 SERPL-SCNC: 21 MMOL/L (ref 21–32)
CREAT SERPL-MCNC: 1.53 MG/DL (ref 0.6–1.3)
EOSINOPHIL # BLD AUTO: 0.11 THOUSAND/ÂΜL (ref 0–0.61)
EOSINOPHIL NFR BLD AUTO: 2 % (ref 0–6)
ERYTHROCYTE [DISTWIDTH] IN BLOOD BY AUTOMATED COUNT: 12.4 % (ref 11.6–15.1)
EST. AVERAGE GLUCOSE BLD GHB EST-MCNC: 269 MG/DL
GFR SERPL CREATININE-BSD FRML MDRD: 52 ML/MIN/1.73SQ M
GLUCOSE P FAST SERPL-MCNC: 356 MG/DL (ref 65–99)
HBA1C MFR BLD: 11 %
HCT VFR BLD AUTO: 37.6 % (ref 36.5–49.3)
HGB BLD-MCNC: 13.2 G/DL (ref 12–17)
IMM GRANULOCYTES # BLD AUTO: 0.01 THOUSAND/UL (ref 0–0.2)
IMM GRANULOCYTES NFR BLD AUTO: 0 % (ref 0–2)
LYMPHOCYTES # BLD AUTO: 1.18 THOUSANDS/ÂΜL (ref 0.6–4.47)
LYMPHOCYTES NFR BLD AUTO: 20 % (ref 14–44)
MCH RBC QN AUTO: 30.3 PG (ref 26.8–34.3)
MCHC RBC AUTO-ENTMCNC: 35.1 G/DL (ref 31.4–37.4)
MCV RBC AUTO: 86 FL (ref 82–98)
MONOCYTES # BLD AUTO: 0.55 THOUSAND/ÂΜL (ref 0.17–1.22)
MONOCYTES NFR BLD AUTO: 9 % (ref 4–12)
NEUTROPHILS # BLD AUTO: 3.97 THOUSANDS/ÂΜL (ref 1.85–7.62)
NEUTS SEG NFR BLD AUTO: 68 % (ref 43–75)
NRBC BLD AUTO-RTO: 0 /100 WBCS
PLATELET # BLD AUTO: 167 THOUSANDS/UL (ref 149–390)
PMV BLD AUTO: 9.4 FL (ref 8.9–12.7)
POTASSIUM SERPL-SCNC: 3.9 MMOL/L (ref 3.5–5.3)
PROT SERPL-MCNC: 7.2 G/DL (ref 6.4–8.4)
RBC # BLD AUTO: 4.36 MILLION/UL (ref 3.88–5.62)
SODIUM SERPL-SCNC: 130 MMOL/L (ref 135–147)
WBC # BLD AUTO: 5.86 THOUSAND/UL (ref 4.31–10.16)

## 2023-02-05 ENCOUNTER — HOSPITAL ENCOUNTER (OUTPATIENT)
Facility: HOSPITAL | Age: 48
Setting detail: OBSERVATION
Discharge: HOME | End: 2023-02-06
Attending: EMERGENCY MEDICINE | Admitting: INTERNAL MEDICINE

## 2023-02-05 ENCOUNTER — APPOINTMENT (EMERGENCY)
Dept: RADIOLOGY | Facility: HOSPITAL | Age: 48
End: 2023-02-05

## 2023-02-05 DIAGNOSIS — R07.9 CHEST PAIN: Primary | ICD-10-CM

## 2023-02-05 PROBLEM — N18.31 STAGE 3A CHRONIC KIDNEY DISEASE (HCC): Status: ACTIVE | Noted: 2023-02-05

## 2023-02-05 LAB
2HR DELTA HS TROPONIN: 0 NG/L
4HR DELTA HS TROPONIN: 3 NG/L
ANION GAP SERPL CALCULATED.3IONS-SCNC: 9 MMOL/L (ref 4–13)
BASOPHILS # BLD AUTO: 0.04 THOUSANDS/ÂΜL (ref 0–0.1)
BASOPHILS NFR BLD AUTO: 1 % (ref 0–1)
BNP SERPL-MCNC: 17 PG/ML (ref 0–100)
BUN SERPL-MCNC: 29 MG/DL (ref 5–25)
CALCIUM SERPL-MCNC: 9.1 MG/DL (ref 8.4–10.2)
CARDIAC TROPONIN I PNL SERPL HS: 14 NG/L
CARDIAC TROPONIN I PNL SERPL HS: 14 NG/L
CARDIAC TROPONIN I PNL SERPL HS: 17 NG/L
CHLORIDE SERPL-SCNC: 98 MMOL/L (ref 96–108)
CHOLEST SERPL-MCNC: 109 MG/DL
CO2 SERPL-SCNC: 23 MMOL/L (ref 21–32)
CREAT SERPL-MCNC: 1.5 MG/DL (ref 0.6–1.3)
EOSINOPHIL # BLD AUTO: 0.1 THOUSAND/ÂΜL (ref 0–0.61)
EOSINOPHIL NFR BLD AUTO: 2 % (ref 0–6)
ERYTHROCYTE [DISTWIDTH] IN BLOOD BY AUTOMATED COUNT: 13.1 % (ref 11.6–15.1)
EST. AVERAGE GLUCOSE BLD GHB EST-MCNC: 269 MG/DL
GFR SERPL CREATININE-BSD FRML MDRD: 53 ML/MIN/1.73SQ M
GLUCOSE SERPL-MCNC: 250 MG/DL (ref 65–140)
GLUCOSE SERPL-MCNC: 323 MG/DL (ref 65–140)
GLUCOSE SERPL-MCNC: 332 MG/DL (ref 65–140)
HBA1C MFR BLD: 11 %
HCT VFR BLD AUTO: 37.7 % (ref 36.5–49.3)
HDLC SERPL-MCNC: 32 MG/DL
HGB BLD-MCNC: 13.1 G/DL (ref 12–17)
IMM GRANULOCYTES # BLD AUTO: 0.01 THOUSAND/UL (ref 0–0.2)
IMM GRANULOCYTES NFR BLD AUTO: 0 % (ref 0–2)
LDLC SERPL CALC-MCNC: 12 MG/DL (ref 0–100)
LYMPHOCYTES # BLD AUTO: 0.98 THOUSANDS/ÂΜL (ref 0.6–4.47)
LYMPHOCYTES NFR BLD AUTO: 16 % (ref 14–44)
MCH RBC QN AUTO: 30.3 PG (ref 26.8–34.3)
MCHC RBC AUTO-ENTMCNC: 34.7 G/DL (ref 31.4–37.4)
MCV RBC AUTO: 87 FL (ref 82–98)
MONOCYTES # BLD AUTO: 0.45 THOUSAND/ÂΜL (ref 0.17–1.22)
MONOCYTES NFR BLD AUTO: 7 % (ref 4–12)
NEUTROPHILS # BLD AUTO: 4.66 THOUSANDS/ÂΜL (ref 1.85–7.62)
NEUTS SEG NFR BLD AUTO: 74 % (ref 43–75)
NONHDLC SERPL-MCNC: 77 MG/DL
NRBC BLD AUTO-RTO: 0 /100 WBCS
PLATELET # BLD AUTO: 141 THOUSANDS/UL (ref 149–390)
PMV BLD AUTO: 9.6 FL (ref 8.9–12.7)
POTASSIUM SERPL-SCNC: 4.2 MMOL/L (ref 3.5–5.3)
RBC # BLD AUTO: 4.33 MILLION/UL (ref 3.88–5.62)
SODIUM SERPL-SCNC: 130 MMOL/L (ref 135–147)
TRIGL SERPL-MCNC: 324 MG/DL
WBC # BLD AUTO: 6.24 THOUSAND/UL (ref 4.31–10.16)

## 2023-02-05 RX ORDER — PRAVASTATIN SODIUM 20 MG
20 TABLET ORAL
Status: DISCONTINUED | OUTPATIENT
Start: 2023-02-05 | End: 2023-02-06 | Stop reason: HOSPADM

## 2023-02-05 RX ORDER — METOPROLOL TARTRATE 50 MG/1
50 TABLET, FILM COATED ORAL EVERY 12 HOURS SCHEDULED
Status: DISCONTINUED | OUTPATIENT
Start: 2023-02-05 | End: 2023-02-06 | Stop reason: HOSPADM

## 2023-02-05 RX ORDER — INSULIN LISPRO 100 [IU]/ML
2-12 INJECTION, SOLUTION INTRAVENOUS; SUBCUTANEOUS
Status: DISCONTINUED | OUTPATIENT
Start: 2023-02-05 | End: 2023-02-06 | Stop reason: HOSPADM

## 2023-02-05 RX ORDER — LOSARTAN POTASSIUM 50 MG/1
100 TABLET ORAL DAILY
Status: DISCONTINUED | OUTPATIENT
Start: 2023-02-06 | End: 2023-02-06 | Stop reason: HOSPADM

## 2023-02-05 RX ORDER — NITROGLYCERIN 0.4 MG/1
0.4 TABLET SUBLINGUAL
Status: DISCONTINUED | OUTPATIENT
Start: 2023-02-05 | End: 2023-02-06 | Stop reason: HOSPADM

## 2023-02-05 RX ORDER — DOXAZOSIN MESYLATE 4 MG/1
4 TABLET ORAL DAILY
Status: DISCONTINUED | OUTPATIENT
Start: 2023-02-06 | End: 2023-02-06 | Stop reason: HOSPADM

## 2023-02-05 RX ORDER — ALLOPURINOL 100 MG/1
100 TABLET ORAL DAILY
Status: DISCONTINUED | OUTPATIENT
Start: 2023-02-06 | End: 2023-02-06 | Stop reason: HOSPADM

## 2023-02-05 RX ORDER — SPIRONOLACTONE 25 MG/1
25 TABLET ORAL DAILY
Status: DISCONTINUED | OUTPATIENT
Start: 2023-02-06 | End: 2023-02-06 | Stop reason: HOSPADM

## 2023-02-05 RX ORDER — HYDROCHLOROTHIAZIDE 25 MG/1
25 TABLET ORAL DAILY
COMMUNITY

## 2023-02-05 RX ORDER — INSULIN LISPRO 100 [IU]/ML
4 INJECTION, SOLUTION INTRAVENOUS; SUBCUTANEOUS
Status: DISCONTINUED | OUTPATIENT
Start: 2023-02-05 | End: 2023-02-06 | Stop reason: HOSPADM

## 2023-02-05 RX ORDER — SODIUM CHLORIDE 9 MG/ML
3 INJECTION INTRAVENOUS
Status: DISCONTINUED | OUTPATIENT
Start: 2023-02-05 | End: 2023-02-06 | Stop reason: HOSPADM

## 2023-02-05 RX ORDER — HEPARIN SODIUM 5000 [USP'U]/ML
7500 INJECTION, SOLUTION INTRAVENOUS; SUBCUTANEOUS EVERY 8 HOURS SCHEDULED
Status: DISCONTINUED | OUTPATIENT
Start: 2023-02-05 | End: 2023-02-06 | Stop reason: HOSPADM

## 2023-02-05 RX ORDER — HYDROCHLOROTHIAZIDE 25 MG/1
25 TABLET ORAL DAILY
Status: DISCONTINUED | OUTPATIENT
Start: 2023-02-06 | End: 2023-02-06 | Stop reason: HOSPADM

## 2023-02-05 RX ORDER — AMLODIPINE BESYLATE 5 MG/1
5 TABLET ORAL DAILY
Status: DISCONTINUED | OUTPATIENT
Start: 2023-02-06 | End: 2023-02-06 | Stop reason: HOSPADM

## 2023-02-05 RX ORDER — HYDRALAZINE HYDROCHLORIDE 25 MG/1
50 TABLET, FILM COATED ORAL 2 TIMES DAILY
Status: DISCONTINUED | OUTPATIENT
Start: 2023-02-05 | End: 2023-02-06 | Stop reason: HOSPADM

## 2023-02-05 RX ORDER — ACETAMINOPHEN 325 MG/1
650 TABLET ORAL EVERY 6 HOURS PRN
Status: DISCONTINUED | OUTPATIENT
Start: 2023-02-05 | End: 2023-02-06 | Stop reason: HOSPADM

## 2023-02-05 RX ORDER — ASPIRIN 81 MG/1
81 TABLET, CHEWABLE ORAL DAILY
Status: DISCONTINUED | OUTPATIENT
Start: 2023-02-06 | End: 2023-02-06 | Stop reason: HOSPADM

## 2023-02-05 RX ORDER — INSULIN GLARGINE 100 [IU]/ML
20 INJECTION, SOLUTION SUBCUTANEOUS
Status: DISCONTINUED | OUTPATIENT
Start: 2023-02-05 | End: 2023-02-06 | Stop reason: HOSPADM

## 2023-02-05 RX ADMIN — HYDRALAZINE HYDROCHLORIDE 50 MG: 25 TABLET, FILM COATED ORAL at 17:49

## 2023-02-05 RX ADMIN — INSULIN GLARGINE 20 UNITS: 100 INJECTION, SOLUTION SUBCUTANEOUS at 21:12

## 2023-02-05 RX ADMIN — INSULIN LISPRO 4 UNITS: 100 INJECTION, SOLUTION INTRAVENOUS; SUBCUTANEOUS at 17:55

## 2023-02-05 RX ADMIN — INSULIN LISPRO 8 UNITS: 100 INJECTION, SOLUTION INTRAVENOUS; SUBCUTANEOUS at 21:13

## 2023-02-05 RX ADMIN — INSULIN LISPRO 6 UNITS: 100 INJECTION, SOLUTION INTRAVENOUS; SUBCUTANEOUS at 17:55

## 2023-02-05 RX ADMIN — METOPROLOL TARTRATE 50 MG: 50 TABLET, FILM COATED ORAL at 20:38

## 2023-02-05 RX ADMIN — PRAVASTATIN SODIUM 20 MG: 20 TABLET ORAL at 17:55

## 2023-02-05 RX ADMIN — HEPARIN SODIUM 7500 UNITS: 5000 INJECTION INTRAVENOUS; SUBCUTANEOUS at 23:33

## 2023-02-05 RX ADMIN — SODIUM CHLORIDE 500 ML: 0.9 INJECTION, SOLUTION INTRAVENOUS at 12:49

## 2023-02-05 RX ADMIN — HEPARIN SODIUM 7500 UNITS: 5000 INJECTION INTRAVENOUS; SUBCUTANEOUS at 17:55

## 2023-02-05 NOTE — ED NOTES
Pt states his mid sternal chest pain is a 1/10   Denies sob at rest     [facility]  02/05/23 [id number]

## 2023-02-05 NOTE — ASSESSMENT & PLAN NOTE
Lab Results   Component Value Date    EGFR 53 02/05/2023    EGFR 52 11/05/2022    EGFR 54 08/18/2022    CREATININE 1 50 (H) 02/05/2023    CREATININE 1 53 (H) 11/05/2022    CREATININE 1 49 (H) 08/18/2022     · Baseline Crt appears to be around 1 4-1 5  · Currently stable  · Monitor renal function while admitted

## 2023-02-05 NOTE — ASSESSMENT & PLAN NOTE
· BP reviewed and elevated  · Likely anxiety/Pain contributing  · Continue Home Medication:  · Amlodipine, Hydralazine, Cardura, Losartan, Metoprolol, HCTZ, and Spirolactone  · Monitor BP while admitted  · Cardiology following

## 2023-02-05 NOTE — ASSESSMENT & PLAN NOTE
· Continue Statin therapy  · Simvastatin transitioned to Pravastatin per Hospital formulary  · Updated Lipid panel: Pending

## 2023-02-05 NOTE — ASSESSMENT & PLAN NOTE
· Likely pseudohyponatremia in the setting of Hyperglycemia  · Na 130 on admission  · Corrected NA of 136 of with a BG of 332  · Plan to initiate glucose correction  · Monitor while admitted

## 2023-02-05 NOTE — ASSESSMENT & PLAN NOTE
Lab Results   Component Value Date    HGBA1C 11 0 (H) 11/05/2022       No results for input(s): POCGLU in the last 72 hours      Blood Sugar Average: Last 72 hrs:     · Home Regimen:  · Humalog 4 units TID with Meals, Lantus 20 units qHS  · Will Hold Metformin and Trulicitiy while admitted   · Updated HgbA1c: Pending  · Notable Hyperglycemia on BMP  · Continue Lantus 20 units qHS  · Continue Humalog 4 units TID with meals  · Initiate Accu-Checks and SSI AC/HS  · Hypoglycemic protocol  · Diabetic diet

## 2023-02-05 NOTE — ASSESSMENT & PLAN NOTE
Patient presented after experiencing chest pain yesterday evening  Patient stated he took a Xanax with resolution of chest pain  Chest pain began again this morning and reported to the ED  · Likely component of anxiety, R/O ACS  · CXR: No acute cardiopulmonary disease  · HS Trop: 14->14  · BNP: Pending  · D-dimer: Pending  · EKG: NSR with possible LVH   No acute ST-T wave abnormalities    · JAI: 2  · Updated Lipid panel/HgbA1c: Pending  · Echo: Ordered  · Initiate Telemetry  · Continue to monitor while admitted  · Notify physician for any chest pain or ST-T wave abnormalities  · Cardiology Consulted

## 2023-02-05 NOTE — H&P
[address]  64   H&P- Jil Chaudhryo 1975, 50 y o  male MRN: [id number]  Unit/Bed#: -01 Encounter: [id number]  Primary Care Provider: Kecia Bob MD   Date and time admitted to hospital: 2/5/2023 11:43 AM    * Chest pain  Assessment & Plan  Patient presented after experiencing chest pain yesterday evening  Patient stated he took a Xanax with resolution of chest pain  Chest pain began again this morning and reported to the ED  · Likely component of anxiety, R/O ACS, PE  · CXR: No acute cardiopulmonary disease  · HS Trop: 14->14  · BNP: Pending  · D-dimer: Pending  · EKG: NSR with possible LVH  No acute ST-T wave abnormalities    · JAI: 2  · Updated Lipid panel/HgbA1c: Pending  · Echo: Ordered  · Initiate Telemetry  · Continue to monitor while admitted  · Notify physician for any chest pain or ST-T wave abnormalities  · Cardiology Consulted    Hyponatremia  Assessment & Plan  · Likely pseudohyponatremia in the setting of Hyperglycemia  · Na 130 on admission  · Corrected NA of 136 of with a BG of 332  · Plan to initiate glucose correction  · Monitor while admitted    Type 2 diabetes mellitus ([facility] [address] )  Assessment & Plan  Lab Results   Component Value Date    HGBA1C 11 0 (H) 11/05/2022       No results for input(s): POCGLU in the last 72 hours      Blood Sugar Average: Last 72 hrs:     · Home Regimen:  · Humalog 4 units TID with Meals, Lantus 20 units qHS  · Will Hold Metformin and Trulicitiy while admitted   · Updated HgbA1c: Pending  · Notable Hyperglycemia on BMP  · Continue Lantus 20 units qHS  · Continue Humalog 4 units TID with meals  · Initiate Accu-Checks and SSI AC/HS  · Hypoglycemic protocol  · Diabetic diet    Stage 3a chronic kidney disease [facility])  Assessment & Plan  Lab Results   Component Value Date    EGFR 53 02/05/2023    EGFR 52 11/05/2022    EGFR 54 08/18/2022    CREATININE 1 50 (H) 02/05/2023    CREATININE 1 53 (H) 11/05/2022    CREATININE 1 49 (H) 08/18/2022 · Baseline Crt appears to be around 1 4-1 5  · Currently stable  · Monitor renal function while admitted    Hypertension  Assessment & Plan  · BP reviewed and elevated  · Likely anxiety/Pain contributing  · Continue Home Medication:  · Amlodipine, Hydralazine, Cardura, Losartan, Metoprolol, HCTZ, and Spirolactone  · Monitor BP while admitted  · Cardiology following    Hyperlipidemia  Assessment & Plan  · Continue Statin therapy  · Simvastatin transitioned to Pravastatin per No Gr U  12   · Updated Lipid panel: Pending    SANJIV (obstructive sleep apnea)  Assessment & Plan  · Follows outpatient with pulmonology, Dr Sirisha Mcnair  · Continue BiPap qHS    Morbid obesity ([facility] [address] )  Assessment & Plan  · Educated on lifestyle modification      VTE Pharmacologic Prophylaxis: VTE Score: 5 High Risk (Score >/= 5) - Pharmacological DVT Prophylaxis Ordered: heparin  Sequential Compression Devices Ordered  Code Status: Level 1 - Full Code   Discussion with family: Patient declined call to   Anticipated Length of Stay: Patient will be admitted on an observation basis with an anticipated length of stay of less than 2 midnights secondary to Chest pain, ACS rule out  Total Time for Visit, including Counseling / Coordination of Care: 60 minutes Greater than 50% of this total time spent on direct patient counseling and coordination of care  Chief Complaint: Chest pain    History of Present Illness:  Kerri Acharya is a 50 y o  male with a PMH of anxiety, hyperlipidemia, hypertension, SANJIV on BiPAP, and diabetes who presents with complaints of chest pain that started yesterday  Patient states he was having ongoing chest pain and took a Xanax with improvement in pain  Patient stated he then began experiencing chest pain again this morning on the day of admission and decided it needed further evaluation      Upon arrival to the ED, vitals with mild hypertension and labs remarkable for hyponatremia, hyperglycemia, and elevated renal function  Patient was given 500 mL bolus  Patient with no episodes of chest pain while in the ED  Due to high risk, patient was admitted observation for further evaluation of chest pain  Review of Systems:  Review of Systems   Constitutional: Negative for chills, fatigue and fever  HENT: Negative for congestion  Eyes: Negative for visual disturbance  Respiratory: Negative for cough, chest tightness and shortness of breath  Cardiovascular: Positive for chest pain and leg swelling  Negative for palpitations  Gastrointestinal: Negative for abdominal pain, diarrhea, nausea and vomiting  Genitourinary: Negative for difficulty urinating  Neurological: Negative for dizziness, syncope and headaches  Psychiatric/Behavioral: Negative for confusion  All other systems reviewed and are negative  Past Medical and Surgical History:   Past Medical History:   Diagnosis Date   • Anxiety    • Diabetes mellitus ([facility] [address] )    • High cholesterol    • Hypertension    • Sleep apnea, obstructive        Past Surgical History:   Procedure Laterality Date   • INCISION AND DRAINAGE ANTERIOR NECK N/A 8/17/2022    Procedure: INCISION AND DRAINAGE  (I&D) NECK;  Surgeon: Amanda Ramos MD;  Location:  MAIN OR;  Service: General       Meds/Allergies:  Prior to Admission medications    Medication Sig Start Date End Date Taking?  Authorizing Provider   allopurinol (ZYLOPRIM) 100 mg tablet  10/18/21  Yes Historical Provider, MD   ALPRAZolam Julia Katelin) 0 5 mg tablet Take 0 5 mg by mouth every 8 (eight) hours as needed 9/21/21  Yes Historical Provider, MD   amLODIPine (NORVASC) 5 mg tablet  10/18/21  Yes Historical Provider, MD   Aspirin Buf,CaCarb-MgCarb-MgO, 81 MG TABS Take 1 tablet by mouth daily   Yes Historical Provider, MD   Cholecalciferol (Vitamin D) 50 MCG (2000 UT) CAPS Take 1 tablet by mouth daily   Yes Historical Provider, MD   doxazosin (CARDURA) 4 mg tablet Take 4 mg by mouth 2 (two) times a day 9/25/21  Yes Historical Provider, MD   hydrALAZINE (APRESOLINE) 50 mg tablet  10/18/21  Yes Historical Provider, MD   hydrochlorothiazide (HYDRODIURIL) 25 mg tablet Take 25 mg by mouth daily   Yes Historical Provider, MD   insulin glargine (LANTUS) 100 units/mL subcutaneous injection Inject 20 Units under the skin daily at bedtime 8/18/22  Yes Winnie Reyes MD   insulin lispro (HumaLOG) 100 units/mL injection Inject 4 Units under the skin 3 (three) times a day with meals 8/18/22  Yes Winnie Reyes MD   losartan (COZAAR) 100 MG tablet  10/18/21  Yes Historical Provider, MD   metFORMIN (GLUCOPHAGE) 1000 MG tablet  10/18/21  Yes Historical Provider, MD   metoprolol tartrate (LOPRESSOR) 50 mg tablet  10/18/21  Yes Historical Provider, MD   Multiple Vitamin (MULTIVITAMIN ADULT PO) Take by mouth   Yes Historical Provider, MD   simvastatin (ZOCOR) 10 mg tablet  10/18/21  Yes Historical Provider, MD   spironolactone (ALDACTONE) 25 mg tablet  10/18/21  Yes Historical Provider, MD   rosuvastatin (CRESTOR) 10 MG tablet Take 10 mg by mouth daily  Patient not taking: Reported on 1/11/2022 12/19/21   Historical Provider, MD Smallity 2 85 UH/2 7YD SOPN inject 0 5 milliliters ( 0 75 milligrams ) subcutaneously every week 9/22/21   Historical Provider, MD     I have reviewed home medications with patient personally      Allergies: No Known Allergies    Social History:  Marital Status: Single   Occupation: Unknown  Patient Pre-hospital Living Situation: Home  Patient Pre-hospital Level of Mobility: walks  Patient Pre-hospital Diet Restrictions: None  Substance Use History:   Social History     Substance and Sexual Activity   Alcohol Use Never     Social History     Tobacco Use   Smoking Status Some Days   • Types: Cigars   Smokeless Tobacco Current   • Types: Chew   Tobacco Comments    chewing tobacco occasionally      Social History     Substance and Sexual Activity   Drug Use Never Family History:  History reviewed  No pertinent family history  Physical Exam:     Vitals:   Blood Pressure: 134/76 (02/05/23 1540)  Pulse: 76 (02/05/23 1540)  Temperature: 98 2 °F (36 8 °C) (02/05/23 1540)  Temp Source: Tympanic (02/05/23 1540)  Respirations: 18 (02/05/23 1540)  Height: 5' 9" (175 3 cm) (02/05/23 1540)  Weight - Scale: (!) 152 kg (335 lb) (02/05/23 1637)  SpO2: 97 % (02/05/23 1540)    Physical Exam  Vitals and nursing note reviewed  Constitutional:       General: He is not in acute distress  Appearance: He is obese  HENT:      Head: Normocephalic  Nose: Nose normal  No congestion  Mouth/Throat:      Mouth: Mucous membranes are dry  Pharynx: Oropharynx is clear  Cardiovascular:      Rate and Rhythm: Normal rate and regular rhythm  Pulses: Normal pulses  Heart sounds: No murmur heard  Pulmonary:      Effort: Pulmonary effort is normal  No respiratory distress  Breath sounds: Decreased breath sounds present  Abdominal:      General: Bowel sounds are normal  There is distension  Palpations: Abdomen is soft  Tenderness: There is no abdominal tenderness  Musculoskeletal:         General: Normal range of motion  Cervical back: Normal range of motion  Right lower leg: Edema present  Left lower leg: Edema present  Skin:     General: Skin is warm and dry  Capillary Refill: Capillary refill takes less than 2 seconds  Neurological:      Mental Status: He is alert and oriented to person, place, and time  Mental status is at baseline  Motor: No weakness  Psychiatric:         Mood and Affect: Mood is anxious  Speech: Speech normal          Behavior: Behavior is cooperative           Additional Data:     Lab Results:  Results from last 7 days   Lab Units 02/05/23  1203   WBC Thousand/uL 6 24   HEMOGLOBIN g/dL 13 1   HEMATOCRIT % 37 7   PLATELETS Thousands/uL 141*   NEUTROS PCT % 74   LYMPHS PCT % 16   MONOS PCT % 7   EOS PCT % 2     Results from last 7 days   Lab Units 02/05/23  1203   SODIUM mmol/L 130*   POTASSIUM mmol/L 4 2   CHLORIDE mmol/L 98   CO2 mmol/L 23   BUN mg/dL 29*   CREATININE mg/dL 1 50*   ANION GAP mmol/L 9   CALCIUM mg/dL 9 1   GLUCOSE RANDOM mg/dL 332*         Results from last 7 days   Lab Units 02/05/23  1645   POC GLUCOSE mg/dl 250*               Lines/Drains:  Invasive Devices     Peripheral Intravenous Line  Duration           Peripheral IV 02/05/23 Right Antecubital <1 day                    Imaging: Reviewed radiology reports from this admission including: chest xray  X-ray chest 1 view portable   ED Interpretation by Edgar Taylor DO (02/05 1216)   No acute cardiopulmonary findings  Final Result by Alice Walls MD (02/05 1251)      No acute cardiopulmonary disease  Workstation performed: [id number]             EKG and Other Studies Reviewed on Admission:   · EKG: NSR  HR 70     ** Please Note: This note has been constructed using a voice recognition system   **

## 2023-02-05 NOTE — ED PROVIDER NOTES
History  Chief Complaint   Patient presents with   • Chest Pain     Chest pain that started last night and was relieved with Xanax, came back again this am and now has some SOB     22-year-old male previous history of obesity, hypertension, diabetes, sleep apnea, hypercholesterolemia  Patient presents for chest pain  Describes it as a dull feeling located in the mid sternum  Nothing makes it better or worse  Started last night  Went away last night  Again started today  Patient with mild pain that comes and goes this morning  Associated with mild dyspnea  No hemoptysis, recent travel or hospitalization, history of VTE, estrogen or testosterone supplementation, unilateral lower extremity edema, cancer history  Patient denies family history of CAD before age 72 in first-degree relatives  Chest Pain  Pain location:  Substernal area  Pain quality: pressure    Pain radiates to the back: no    Pain severity:  Mild  Onset quality:  Gradual  Timing:  Intermittent  Progression:  Worsening  Chronicity:  New  Relieved by:  Nothing  Worsened by:  Nothing tried  Ineffective treatments:  None tried      Prior to Admission Medications   Prescriptions Last Dose Informant Patient Reported? Taking?    ALPRAZolam (XANAX) 0 5 mg tablet 2/4/2023  Yes Yes   Sig: Take 0 5 mg by mouth every 8 (eight) hours as needed   Aspirin Buf,CaCarb-MgCarb-MgO, 81 MG TABS 2/5/2023  Yes Yes   Sig: Take 1 tablet by mouth daily   Cholecalciferol (Vitamin D) 50 MCG (2000 UT) CAPS 2/5/2023  Yes Yes   Sig: Take 1 tablet by mouth daily   Multiple Vitamin (MULTIVITAMIN ADULT PO) 2/5/2023  Yes Yes   Sig: Take by mouth   Trulicity 3 85 MI/1 7II SOPN   Yes No   Sig: inject 0 5 milliliters ( 0 75 milligrams ) subcutaneously every week   allopurinol (ZYLOPRIM) 100 mg tablet 2/5/2023  Yes Yes   amLODIPine (NORVASC) 5 mg tablet 2/5/2023  Yes Yes   doxazosin (CARDURA) 4 mg tablet 2/5/2023  Yes Yes   Sig: Take 4 mg by mouth 2 (two) times a day hydrALAZINE (APRESOLINE) 50 mg tablet 2/5/2023  Yes Yes   hydrochlorothiazide (HYDRODIURIL) 25 mg tablet 2/5/2023  Yes Yes   Sig: Take 25 mg by mouth daily   insulin glargine (LANTUS) 100 units/mL subcutaneous injection 2/4/2023  No Yes   Sig: Inject 20 Units under the skin daily at bedtime   insulin lispro (HumaLOG) 100 units/mL injection 2/5/2023  No Yes   Sig: Inject 4 Units under the skin 3 (three) times a day with meals   losartan (COZAAR) 100 MG tablet 2/5/2023  Yes Yes   metFORMIN (GLUCOPHAGE) 1000 MG tablet 2/5/2023  Yes Yes   metoprolol tartrate (LOPRESSOR) 50 mg tablet 2/5/2023  Yes Yes   rosuvastatin (CRESTOR) 10 MG tablet Not Taking  Yes No   Sig: Take 10 mg by mouth daily   Patient not taking: Reported on 1/11/2022   simvastatin (ZOCOR) 10 mg tablet 2/5/2023  Yes Yes   spironolactone (ALDACTONE) 25 mg tablet 2/5/2023  Yes Yes      Facility-Administered Medications: None       Past Medical History:   Diagnosis Date   • Anxiety    • Diabetes mellitus ([address] )    • High cholesterol    • Hypertension    • Sleep apnea, obstructive        Past Surgical History:   Procedure Laterality Date   • INCISION AND DRAINAGE ANTERIOR NECK N/A 8/17/2022    Procedure: INCISION AND DRAINAGE  (I&D) NECK;  Surgeon: Marge Angeles MD;  Location: [facility] OR;  Service: General       History reviewed  No pertinent family history  I have reviewed and agree with the history as documented  E-Cigarette/Vaping   • E-Cigarette Use Never User      E-Cigarette/Vaping Substances   • Nicotine No    • THC No    • CBD No    • Flavoring No    • Other No    • Unknown No      Social History     Tobacco Use   • Smoking status: Some Days     Types: Cigars   • Smokeless tobacco: Current     Types: Chew   • Tobacco comments:     chewing tobacco occasionally    Vaping Use   • Vaping Use: Never used   Substance Use Topics   • Alcohol use: Never   • Drug use: Never       Review of Systems   Cardiovascular: Positive for chest pain     All other systems reviewed and are negative  Physical Exam  Physical Exam  Vitals and nursing note reviewed  Constitutional:       General: He is not in acute distress  Appearance: He is well-developed  He is not diaphoretic  HENT:      Head: Normocephalic and atraumatic  Right Ear: External ear normal       Left Ear: External ear normal    Eyes:      Conjunctiva/sclera: Conjunctivae normal    Neck:      Trachea: No tracheal deviation  Cardiovascular:      Rate and Rhythm: Normal rate and regular rhythm  Heart sounds: Normal heart sounds  No murmur heard  Pulmonary:      Effort: No respiratory distress  Breath sounds: Normal breath sounds  No stridor  No wheezing or rales  Abdominal:      General: Bowel sounds are normal  There is no distension  Palpations: Abdomen is soft  There is no mass  Tenderness: There is no abdominal tenderness  There is no guarding or rebound  Musculoskeletal:         General: No tenderness or deformity  Right lower leg: No edema  Left lower leg: No edema  Skin:     General: Skin is dry  Findings: No rash  Neurological:      Motor: No abnormal muscle tone  Coordination: Coordination normal    Psychiatric:         Behavior: Behavior normal          Thought Content:  Thought content normal          Judgment: Judgment normal          Vital Signs  ED Triage Vitals   Temperature Pulse Respirations Blood Pressure SpO2   02/05/23 1153 02/05/23 1153 02/05/23 1153 02/05/23 1153 02/05/23 1153   98 4 °F (36 9 °C) 84 20 160/90 100 %      Temp Source Heart Rate Source Patient Position - Orthostatic VS BP Location FiO2 (%)   02/05/23 1153 02/05/23 1358 02/05/23 1358 02/05/23 1358 --   Oral Monitor Lying Left arm       Pain Score       02/05/23 1146       5           Vitals:    02/05/23 1358 02/05/23 1432 02/05/23 1508 02/05/23 1540   BP: 144/100 160/85 164/91 134/76   Pulse: 74 74 83 76   Patient Position - Orthostatic VS: Lying Lying Lying Sitting         Visual Acuity      ED Medications  Medications   sodium chloride (PF) 0 9 % injection 3 mL (has no administration in time range)   sodium chloride 0 9 % bolus 500 mL (0 mL Intravenous Stopped 2/5/23 1358)       Diagnostic Studies  Results Reviewed     Procedure Component Value Units Date/Time    HS Troponin I 2hr [[id number]]  (Normal) Collected: 02/05/23 1400    Lab Status: Final result Specimen: Blood from Arm, Right Updated: 02/05/23 1428     hs TnI 2hr 14 ng/L      Delta 2hr hsTnI 0 ng/L     HS Troponin 0hr (reflex protocol) [[id number]]  (Normal) Collected: 02/05/23 1203    Lab Status: Final result Specimen: Blood from Arm, Right Updated: 02/05/23 1235     hs TnI 0hr 14 ng/L     HS Troponin I 4hr [[id number]]     Lab Status: No result Specimen: Blood     Basic metabolic panel [[id number]]  (Abnormal) Collected: 02/05/23 1203    Lab Status: Final result Specimen: Blood from Arm, Right Updated: 02/05/23 1227     Sodium 130 mmol/L      Potassium 4 2 mmol/L      Chloride 98 mmol/L      CO2 23 mmol/L      ANION GAP 9 mmol/L      BUN 29 mg/dL      Creatinine 1 50 mg/dL      Glucose 332 mg/dL      Calcium 9 1 mg/dL      eGFR 53 ml/min/1 73sq m     Narrative:      Krissy guidelines for Chronic Kidney Disease (CKD):   •  Stage 1 with normal or high GFR (GFR > 90 mL/min/1 73 square meters)  •  Stage 2 Mild CKD (GFR = 60-89 mL/min/1 73 square meters)  •  Stage 3A Moderate CKD (GFR = 45-59 mL/min/1 73 square meters)  •  Stage 3B Moderate CKD (GFR = 30-44 mL/min/1 73 square meters)  •  Stage 4 Severe CKD (GFR = 15-29 mL/min/1 73 square meters)  •  Stage 5 End Stage CKD (GFR <15 mL/min/1 73 square meters)  Note: GFR calculation is accurate only with a steady state creatinine    CBC and differential [[id number]]  (Abnormal) Collected: 02/05/23 1203    Lab Status: Final result Specimen: Blood from Arm, Right Updated: 02/05/23 1217     WBC 6 24 Thousand/uL      RBC 4 33 Million/uL Hemoglobin 13 1 g/dL      Hematocrit 37 7 %      MCV 87 fL      MCH 30 3 pg      MCHC 34 7 g/dL      RDW 13 1 %      MPV 9 6 fL      Platelets 035 Thousands/uL      nRBC 0 /100 WBCs      Neutrophils Relative 74 %      Immat GRANS % 0 %      Lymphocytes Relative 16 %      Monocytes Relative 7 %      Eosinophils Relative 2 %      Basophils Relative 1 %      Neutrophils Absolute 4 66 Thousands/µL      Immature Grans Absolute 0 01 Thousand/uL      Lymphocytes Absolute 0 98 Thousands/µL      Monocytes Absolute 0 45 Thousand/µL      Eosinophils Absolute 0 10 Thousand/µL      Basophils Absolute 0 04 Thousands/µL                  X-ray chest 1 view portable   ED Interpretation by Ignacio Fairbanks DO (02/05 1216)   No acute cardiopulmonary findings  Final Result by Lorna Xiao MD (02/05 1251)      No acute cardiopulmonary disease  Workstation performed: [id number]                    Procedures  Procedures         ED Course  ED Course as of 02/05/23 1604   Sun Feb 05, 2023   1239 Creatinine(!): 1 50  At baseline     1240 Sodium(!): 130  Corrected sodium 134    1241 Glucose, Random(!): 332   1250 Patient informed that he is heart score 5   13% chance of major coronary event over the next 6 weeks  Recommended admission to the hospital   Patient is unsure if he wants to be admitted to the hospital at this point  If patient declines we will send in referral for quick follow-up  1420 Procedure Note: EKG  Date/Time: 02/05/23 2:20 PM   Interpreted by: Tatyana General  Indications / Diagnosis: CP  ECG reviewed by me, the ED Provider: yes   The EKG demonstrates:  Rhythm: normal sinus  Intervals: normal intervals except qrs 150  Axis: L axis  QRS/Blocks: wide QRS  ST Changes: No acute ST Changes, no STD/MELISSA   LVH                 HEART Risk Score    Flowsheet Row Most Recent Value   Heart Score Risk Calculator    History 0 Filed at: 02/05/2023 1241   ECG 1 Filed at: 02/05/2023 1241   Age 1 Filed at: 02/05/2023 1241   Risk Factors 2 Filed at: 02/05/2023 1241   Troponin 1 Filed at: 02/05/2023 1241   HEART Score 5 Filed at: 02/05/2023 1241                        SBIRT 20yo+    Flowsheet Row Most Recent Value   SBIRT (25 yo +)    In order to provide better care to our patients, we are screening all of our patients for alcohol and drug use  Would it be okay to ask you these screening questions? No Filed at: 02/05/2023 1207        JAI Risk Score    Flowsheet Row Most Recent Value   Age >= 72 0 Filed at: 02/05/2023 1556   Known CAD (stenosis >= 50%) 0 Filed at: 02/05/2023 1556   Recent (<=24 hrs) Service Angina 1 Filed at: 02/05/2023 1556   ST Deviation >= 0 5 mm 0 Filed at: 02/05/2023 1556   3+ CAD Risk Factors (FHx, HTN, HLP, DM, Smoker) 1 Filed at: 02/05/2023 1556   Aspirin Use Past 7 Days 0 Filed at: 02/05/2023 1556   Elevated Cardiac Markers 0 Filed at: 02/05/2023 1556   JAI Risk Score (Calculated) 2 Filed at: 02/05/2023 1556                  Medical Decision Making  43-year-old male with chest pain  Multiple cardiac risk factors  Patient's Wells score 0  He has a PERC score of 0  No suspicion for pulmonary embolism  Will evaluate for ACS, arrhythmia, electrolyte abnormality, pneumonia, pneumothorax  Troponin and delta troponin in the normal range  Heart score of 5 secondary to risk factors and history  No acute findings on chest x-ray per my read  Blood work and electrolytes without significant findings  Will plan admission for ACS rule out  Chest pain: self-limited or minor problem  Amount and/or Complexity of Data Reviewed  Labs: ordered  Decision-making details documented in ED Course  Radiology: ordered and independent interpretation performed  Risk  Prescription drug management  Decision regarding hospitalization            Disposition  Final diagnoses:   Chest pain     Time reflects when diagnosis was documented in both MDM as applicable and the Disposition within this note Time User Action Codes Description Comment    2/5/2023  3:12 PM Sb Rodriguez [R07 [id number]] Chest pain       ED Disposition     ED Disposition   Admit    Condition   Stable    Date/Time   Sun Feb 5, 2023  3:12 PM    Comment   Case was discussed with negrito and the patient's admission status was agreed to be Admission Status: observation status to the service of Dr Clovis Shen   Follow-up Information    None         Current Discharge Medication List      CONTINUE these medications which have NOT CHANGED    Details   allopurinol (ZYLOPRIM) 100 mg tablet       ALPRAZolam (XANAX) 0 5 mg tablet Take 0 5 mg by mouth every 8 (eight) hours as needed      amLODIPine (NORVASC) 5 mg tablet       Aspirin Buf,CaCarb-MgCarb-MgO, 81 MG TABS Take 1 tablet by mouth daily      Cholecalciferol (Vitamin D) 50 MCG (2000 UT) CAPS Take 1 tablet by mouth daily      doxazosin (CARDURA) 4 mg tablet Take 4 mg by mouth 2 (two) times a day      hydrALAZINE (APRESOLINE) 50 mg tablet       hydrochlorothiazide (HYDRODIURIL) 25 mg tablet Take 25 mg by mouth daily      insulin glargine (LANTUS) 100 units/mL subcutaneous injection Inject 20 Units under the skin daily at bedtime  Qty: 10 mL, Refills: 0    Associated Diagnoses: Type 2 diabetes mellitus (HCC)      insulin lispro (HumaLOG) 100 units/mL injection Inject 4 Units under the skin 3 (three) times a day with meals  Qty: 3 mL, Refills: 0    Associated Diagnoses: Type 2 diabetes mellitus (HCC)      losartan (COZAAR) 100 MG tablet       metFORMIN (GLUCOPHAGE) 1000 MG tablet       metoprolol tartrate (LOPRESSOR) 50 mg tablet       Multiple Vitamin (MULTIVITAMIN ADULT PO) Take by mouth      simvastatin (ZOCOR) 10 mg tablet       spironolactone (ALDACTONE) 25 mg tablet       rosuvastatin (CRESTOR) 10 MG tablet Take 10 mg by mouth daily      Trulicity 0 63 UO/5 1NE SOPN inject 0 5 milliliters ( 0 75 milligrams ) subcutaneously every week             No discharge procedures on file      PDMP Review     None          ED Provider  Electronically Signed by           Omid Villarreal DO  02/05/23 9351

## 2023-02-05 NOTE — PLAN OF CARE
Problem: CARDIOVASCULAR - ADULT  Goal: Maintains optimal cardiac output and hemodynamic stability  Description: INTERVENTIONS:  - Monitor I/O, vital signs and rhythm  - Monitor for S/S and trends of decreased cardiac output  - Administer and titrate ordered vasoactive medications to optimize hemodynamic stability  - Assess quality of pulses, skin color and temperature  - Assess for signs of decreased coronary artery perfusion  - Instruct patient to report change in severity of symptoms  Outcome: Progressing  Goal: Absence of cardiac dysrhythmias or at baseline rhythm  Description: INTERVENTIONS:  - Continuous cardiac monitoring, vital signs, obtain 12 lead EKG if ordered  - Administer antiarrhythmic and heart rate control medications as ordered  - Monitor electrolytes and administer replacement therapy as ordered  Outcome: Progressing     Problem: PAIN - ADULT  Goal: Verbalizes/displays adequate comfort level or baseline comfort level  Description: Interventions:  - Encourage patient to monitor pain and request assistance  - Assess pain using appropriate pain scale  - Administer analgesics based on type and severity of pain and evaluate response  - Implement non-pharmacological measures as appropriate and evaluate response  - Consider cultural and social influences on pain and pain management  - Notify physician/advanced practitioner if interventions unsuccessful or patient reports new pain  Outcome: Progressing     Problem: INFECTION - ADULT  Goal: Absence or prevention of progression during hospitalization  Description: INTERVENTIONS:  - Assess and monitor for signs and symptoms of infection  - Monitor lab/diagnostic results  - Administer medications as ordered  - Instruct and encourage patient and family to use good hand hygiene technique  Outcome: Progressing     Problem: INFECTION - ADULT  Goal: Absence of fever/infection during neutropenic period  Description: INTERVENTIONS:  - Monitor WBC    Outcome: Progressing     Problem: SAFETY ADULT  Goal: Patient will remain free of falls  Description: INTERVENTIONS:  - Educate patient/family on patient safety including physical limitations  - Instruct patient to call for assistance with activity   - Consult OT/PT to assist with strengthening/mobility   - Keep Call bell within reach  - Keep bed low and locked with side rails adjusted as appropriate  - Keep care items and personal belongings within reach  - Initiate and maintain comfort rounds  - Offer Toileting every  2 Hours, in advance of need  - Initiate/Maintain  bed and chair alarm  - Apply yellow socks and bracelet for high fall risk patients  - Consider moving patient to room near nurses station  Outcome: Progressing  Goal: Maintain or return to baseline ADL function  Description: INTERVENTIONS:  -  Assess patient's ability to carry out ADLs; assess patient's baseline for ADL function and identify physical deficits which impact ability to perform ADLs (bathing, care of mouth/teeth, toileting, grooming, dressing, etc )  - Assess/evaluate cause of self-care deficits   - Assess range of motion  - Assess patient's mobility; develop plan if impaired  - Assess patient's need for assistive devices and provide as appropriate  - Encourage maximum independence but intervene and supervise when necessary  - Involve family in performance of ADLs  - Assess for home care needs following discharge   - Consider OT consult to assist with ADL evaluation and planning for discharge  - Provide patient education as appropriate  Outcome: Progressing  Goal: Maintains/Returns to pre admission functional level  Description: INTERVENTIONS:  - Perform BMAT or MOVE assessment daily    - Set and communicate daily mobility goal to care team and patient/family/caregiver  - Collaborate with rehabilitation services on mobility goals if consulted  - Perform Range of Motion 3  times a day    - Dangle patient 3 times a day  - Stand patient  3  times a day  - Ambulate patient 3  times a day  - Out of bed to chair 3  times a day   - Out of bed for meals  3  times a day  - Out of bed for toileting  - Record patient progress and toleration of activity level   Outcome: Progressing     Problem: DISCHARGE PLANNING  Goal: Discharge to home or other facility with appropriate resources  Description: INTERVENTIONS:  - Identify barriers to discharge w/patient and caregiver  - Arrange for needed discharge resources and transportation as appropriate  - Identify discharge learning needs (meds, wound care, etc )  - Arrange for interpretive services to assist at discharge as needed  - Refer to Case Management Department for coordinating discharge planning if the patient needs post-hospital services based on physician/advanced practitioner order or complex needs related to functional status, cognitive ability, or social support system  Outcome: Progressing     Problem: Knowledge Deficit  Goal: Patient/family/caregiver demonstrates understanding of disease process, treatment plan, medications, and discharge instructions  Description: Complete learning assessment and assess knowledge base    Interventions:  - Provide teaching at level of understanding  - Provide teaching via preferred learning methods  Outcome: Progressing

## 2023-02-06 ENCOUNTER — APPOINTMENT (OUTPATIENT)
Dept: NON INVASIVE DIAGNOSTICS | Facility: HOSPITAL | Age: 48
End: 2023-02-06

## 2023-02-06 VITALS
HEART RATE: 74 BPM | HEIGHT: 69 IN | OXYGEN SATURATION: 96 % | TEMPERATURE: 97.8 F | BODY MASS INDEX: 46.65 KG/M2 | RESPIRATION RATE: 16 BRPM | DIASTOLIC BLOOD PRESSURE: 87 MMHG | SYSTOLIC BLOOD PRESSURE: 143 MMHG | WEIGHT: 315 LBS

## 2023-02-06 LAB
ALBUMIN SERPL BCP-MCNC: 4.1 G/DL (ref 3.5–5)
ALP SERPL-CCNC: 59 U/L (ref 34–104)
ALT SERPL W P-5'-P-CCNC: 43 U/L (ref 7–52)
ANION GAP SERPL CALCULATED.3IONS-SCNC: 10 MMOL/L (ref 4–13)
AORTIC ROOT: 2.8 CM
APICAL FOUR CHAMBER EJECTION FRACTION: 63 %
ASCENDING AORTA: 2.8 CM
AST SERPL W P-5'-P-CCNC: 34 U/L (ref 13–39)
ATRIAL RATE: 70 BPM
ATRIAL RATE: 70 BPM
ATRIAL RATE: 77 BPM
BASOPHILS # BLD AUTO: 0.04 THOUSANDS/ÂΜL (ref 0–0.1)
BASOPHILS NFR BLD AUTO: 1 % (ref 0–1)
BILIRUB SERPL-MCNC: 0.56 MG/DL (ref 0.2–1)
BUN SERPL-MCNC: 25 MG/DL (ref 5–25)
CALCIUM SERPL-MCNC: 9.1 MG/DL (ref 8.4–10.2)
CHLORIDE SERPL-SCNC: 102 MMOL/L (ref 96–108)
CO2 SERPL-SCNC: 24 MMOL/L (ref 21–32)
CREAT SERPL-MCNC: 1.4 MG/DL (ref 0.6–1.3)
D DIMER PPP FEU-MCNC: 0.49 UG/ML FEU
E WAVE DECELERATION TIME: 230 MS
EOSINOPHIL # BLD AUTO: 0.11 THOUSAND/ÂΜL (ref 0–0.61)
EOSINOPHIL NFR BLD AUTO: 2 % (ref 0–6)
ERYTHROCYTE [DISTWIDTH] IN BLOOD BY AUTOMATED COUNT: 13.2 % (ref 11.6–15.1)
FRACTIONAL SHORTENING: 30 % (ref 28–44)
GFR SERPL CREATININE-BSD FRML MDRD: 58 ML/MIN/1.73SQ M
GLUCOSE SERPL-MCNC: 158 MG/DL (ref 65–140)
GLUCOSE SERPL-MCNC: 168 MG/DL (ref 65–140)
GLUCOSE SERPL-MCNC: 255 MG/DL (ref 65–140)
GLUCOSE SERPL-MCNC: 303 MG/DL (ref 65–140)
HCT VFR BLD AUTO: 38.9 % (ref 36.5–49.3)
HGB BLD-MCNC: 13.1 G/DL (ref 12–17)
IMM GRANULOCYTES # BLD AUTO: 0.01 THOUSAND/UL (ref 0–0.2)
IMM GRANULOCYTES NFR BLD AUTO: 0 % (ref 0–2)
INTERVENTRICULAR SEPTUM IN DIASTOLE (PARASTERNAL SHORT AXIS VIEW): 1.2 CM
INTERVENTRICULAR SEPTUM: 1.2 CM (ref 0.6–1.1)
LAAS-AP2: 27.2 CM2
LAAS-AP4: 22.8 CM2
LEFT ATRIUM SIZE: 4.8 CM
LEFT INTERNAL DIMENSION IN SYSTOLE: 3.3 CM (ref 2.1–4)
LEFT VENTRICULAR INTERNAL DIMENSION IN DIASTOLE: 4.7 CM (ref 3.5–6)
LEFT VENTRICULAR POSTERIOR WALL IN END DIASTOLE: 1.3 CM
LEFT VENTRICULAR STROKE VOLUME: 58 ML
LVSV (TEICH): 58 ML
LYMPHOCYTES # BLD AUTO: 1.39 THOUSANDS/ÂΜL (ref 0.6–4.47)
LYMPHOCYTES NFR BLD AUTO: 27 % (ref 14–44)
MAGNESIUM SERPL-MCNC: 1.9 MG/DL (ref 1.9–2.7)
MCH RBC QN AUTO: 29.4 PG (ref 26.8–34.3)
MCHC RBC AUTO-ENTMCNC: 33.7 G/DL (ref 31.4–37.4)
MCV RBC AUTO: 87 FL (ref 82–98)
MONOCYTES # BLD AUTO: 0.42 THOUSAND/ÂΜL (ref 0.17–1.22)
MONOCYTES NFR BLD AUTO: 8 % (ref 4–12)
MV E'TISSUE VEL-LAT: 11 CM/S
MV PEAK A VEL: 0.76 M/S
MV PEAK E VEL: 75 CM/S
MV STENOSIS PRESSURE HALF TIME: 67 MS
MV VALVE AREA P 1/2 METHOD: 3.28 CM2
NEUTROPHILS # BLD AUTO: 3.28 THOUSANDS/ÂΜL (ref 1.85–7.62)
NEUTS SEG NFR BLD AUTO: 62 % (ref 43–75)
NRBC BLD AUTO-RTO: 0 /100 WBCS
P AXIS: 151 DEGREES
P AXIS: 26 DEGREES
P AXIS: 48 DEGREES
PLATELET # BLD AUTO: 146 THOUSANDS/UL (ref 149–390)
PMV BLD AUTO: 9.6 FL (ref 8.9–12.7)
POTASSIUM SERPL-SCNC: 3.8 MMOL/L (ref 3.5–5.3)
PR INTERVAL: 160 MS
PR INTERVAL: 162 MS
PR INTERVAL: 170 MS
PROT SERPL-MCNC: 6.9 G/DL (ref 6.4–8.4)
QRS AXIS: -57 DEGREES
QRS AXIS: -60 DEGREES
QRS AXIS: 228 DEGREES
QRSD INTERVAL: 150 MS
QRSD INTERVAL: 150 MS
QRSD INTERVAL: 152 MS
QT INTERVAL: 374 MS
QT INTERVAL: 398 MS
QT INTERVAL: 404 MS
QTC INTERVAL: 423 MS
QTC INTERVAL: 429 MS
QTC INTERVAL: 436 MS
RBC # BLD AUTO: 4.45 MILLION/UL (ref 3.88–5.62)
RIGHT ATRIUM AREA SYSTOLE A4C: 13.8 CM2
RIGHT VENTRICLE ID DIMENSION: 3.9 CM
SL CV LEFT ATRIUM LENGTH A2C: 6.6 CM
SL CV LV EF: 60
SL CV PED ECHO LEFT VENTRICLE DIASTOLIC VOLUME (MOD BIPLANE) 2D: 103 ML
SL CV PED ECHO LEFT VENTRICLE SYSTOLIC VOLUME (MOD BIPLANE) 2D: 45 ML
SODIUM SERPL-SCNC: 136 MMOL/L (ref 135–147)
T WAVE AXIS: 111 DEGREES
T WAVE AXIS: 71 DEGREES
T WAVE AXIS: 97 DEGREES
TRICUSPID ANNULAR PLANE SYSTOLIC EXCURSION: 2.5 CM
VENTRICULAR RATE: 70 BPM
VENTRICULAR RATE: 70 BPM
VENTRICULAR RATE: 77 BPM
WBC # BLD AUTO: 5.25 THOUSAND/UL (ref 4.31–10.16)

## 2023-02-06 RX ORDER — PANTOPRAZOLE SODIUM 40 MG/1
40 TABLET, DELAYED RELEASE ORAL
Status: DISCONTINUED | OUTPATIENT
Start: 2023-02-06 | End: 2023-02-06 | Stop reason: HOSPADM

## 2023-02-06 RX ORDER — PANTOPRAZOLE SODIUM 40 MG/1
40 TABLET, DELAYED RELEASE ORAL
Qty: 30 TABLET | Refills: 0 | Status: SHIPPED | OUTPATIENT
Start: 2023-02-07

## 2023-02-06 RX ADMIN — SPIRONOLACTONE 25 MG: 25 TABLET ORAL at 08:32

## 2023-02-06 RX ADMIN — HYDROCHLOROTHIAZIDE 25 MG: 25 TABLET ORAL at 08:25

## 2023-02-06 RX ADMIN — INSULIN LISPRO 4 UNITS: 100 INJECTION, SOLUTION INTRAVENOUS; SUBCUTANEOUS at 08:24

## 2023-02-06 RX ADMIN — PRAVASTATIN SODIUM 20 MG: 20 TABLET ORAL at 17:06

## 2023-02-06 RX ADMIN — METOPROLOL TARTRATE 50 MG: 50 TABLET, FILM COATED ORAL at 08:25

## 2023-02-06 RX ADMIN — INSULIN LISPRO 6 UNITS: 100 INJECTION, SOLUTION INTRAVENOUS; SUBCUTANEOUS at 12:22

## 2023-02-06 RX ADMIN — INSULIN LISPRO 4 UNITS: 100 INJECTION, SOLUTION INTRAVENOUS; SUBCUTANEOUS at 12:22

## 2023-02-06 RX ADMIN — INSULIN LISPRO 2 UNITS: 100 INJECTION, SOLUTION INTRAVENOUS; SUBCUTANEOUS at 08:24

## 2023-02-06 RX ADMIN — HYDRALAZINE HYDROCHLORIDE 50 MG: 25 TABLET, FILM COATED ORAL at 08:25

## 2023-02-06 RX ADMIN — LOSARTAN POTASSIUM 100 MG: 50 TABLET, FILM COATED ORAL at 08:25

## 2023-02-06 RX ADMIN — HEPARIN SODIUM 7500 UNITS: 5000 INJECTION INTRAVENOUS; SUBCUTANEOUS at 13:13

## 2023-02-06 RX ADMIN — DOXAZOSIN 4 MG: 4 TABLET ORAL at 08:25

## 2023-02-06 RX ADMIN — HYDRALAZINE HYDROCHLORIDE 50 MG: 25 TABLET, FILM COATED ORAL at 17:06

## 2023-02-06 RX ADMIN — INSULIN LISPRO 4 UNITS: 100 INJECTION, SOLUTION INTRAVENOUS; SUBCUTANEOUS at 17:06

## 2023-02-06 RX ADMIN — ALLOPURINOL 100 MG: 100 TABLET ORAL at 08:25

## 2023-02-06 RX ADMIN — HEPARIN SODIUM 7500 UNITS: 5000 INJECTION INTRAVENOUS; SUBCUTANEOUS at 05:14

## 2023-02-06 RX ADMIN — ASPIRIN 81 MG 81 MG: 81 TABLET ORAL at 08:25

## 2023-02-06 RX ADMIN — INSULIN LISPRO 8 UNITS: 100 INJECTION, SOLUTION INTRAVENOUS; SUBCUTANEOUS at 17:07

## 2023-02-06 RX ADMIN — PERFLUTREN 0.6 ML/MIN: 6.52 INJECTION, SUSPENSION INTRAVENOUS at 12:00

## 2023-02-06 RX ADMIN — AMLODIPINE BESYLATE 5 MG: 5 TABLET ORAL at 08:25

## 2023-02-06 RX ADMIN — PANTOPRAZOLE SODIUM 40 MG: 40 TABLET, DELAYED RELEASE ORAL at 13:13

## 2023-02-06 NOTE — CONSULTS
[facility]                                              Cardiology Consult  Debbie Fuentes 50 y o  male   YOB: 1975 MRN: [id number]  Unit/Bed#: -[place] Encounter: [id number]      Physician Requesting Consult: Moody Carson*  Reason for Consult / Principal Problem: Chest pain    Assessments  Principal Problem:    Chest pain  Active Problems: Morbid obesity ([address] )    Hypertension    SANJIV (obstructive sleep apnea)    Hyperlipidemia    Hyponatremia    Type 2 diabetes mellitus (HCC)    Stage 3a chronic kidney disease [facility])    Outpatient cardiologist - Vahe Ratel    Plan  · Atypical chest pain, at least 2 episodes in the last 2 days occurring at rest, pressure-like sensation, denies exertional worsening, and currently is resolved  · ECG with nonspecific intraventricular conduction block, no acute ST-T changes  · Hs-troponins negative x3  · He does have multiple risk factors for CAD, including uncontrolled insulin-dependent diabetes (A1c 11), CKD (Cr 1 4), morbid obesity (BMI 49)  · ? GERD/GI related v CAD  · Check echocardiogram  · Ambulate around the floor to reassess symptoms  · He is unable to exercise adequately for stress due to knee arthritis  · If no chest pain with exertion, and echocardiogram within normal limits, then can plan on completing a short interval outpatient nuclear Lexiscan stress test  · Recommend 4 week trial of PPI in the interim as well    ECG: Personally reviewed  NSR, non-specific intraventricular   Telemetry: Personally reviewed  NSR, no acute events    History of Present Illness   HPI: Alexsander Fernando is a 50y o  year old male , who runs the local [employer], presents with complaints of chest pain, occurring on and off for the past 2 days  It first occurred while he was working at [employer] on Saturday    It started while he was at rest, and was described as a pressure-like sensation without any clear radiation, or any associated symptoms of nausea vomiting or diaphoresis  It resolved shortly thereafter, and he continued working that day  He hoped his symptoms would be gone, but next morning when he woke up he again had similar symptoms on sitting upright  He was concerned and as result decided to come into the ED for evaluation  He does admit to a lot of increased stress recently  No prior history of CAD or heart failure  Family history  Father - had ? CAD in 76s, had pacemaker +/- stent, alive at 80  Mother - had heart attack in 76s, had open heart surgery    Past Medical History:   Diagnosis Date   • Anxiety    • Diabetes mellitus ([address] )    • High cholesterol    • Hypertension    • Sleep apnea, obstructive      Past Surgical History:   Procedure Laterality Date   • INCISION AND DRAINAGE ANTERIOR NECK N/A 8/17/2022    Procedure: INCISION AND DRAINAGE  (I&D) NECK;  Surgeon: Travon Acharya MD;  Location:  MAIN OR;  Service: General     History reviewed  No pertinent family history    Meds/Allergies   all current active meds have been reviewed and current meds:   Current Facility-Administered Medications   Medication Dose Route Frequency   • acetaminophen (TYLENOL) tablet 650 mg  650 mg Oral Q6H PRN   • allopurinol (ZYLOPRIM) tablet 100 mg  100 mg Oral Daily   • amLODIPine (NORVASC) tablet 5 mg  5 mg Oral Daily   • aspirin chewable tablet 81 mg  81 mg Oral Daily   • doxazosin (CARDURA) tablet 4 mg  4 mg Oral Daily   • heparin (porcine) subcutaneous injection 7,500 Units  7,500 Units Subcutaneous Q8H [address]   • hydrALAZINE (APRESOLINE) tablet 50 mg  50 mg Oral BID   • hydrochlorothiazide (HYDRODIURIL) tablet 25 mg  25 mg Oral Daily   • insulin glargine (LANTUS) subcutaneous injection 20 Units 0 2 mL  20 Units Subcutaneous HS   • insulin lispro (HumaLOG) 100 units/mL subcutaneous injection 2-12 Units  2-12 Units Subcutaneous TID AC   • insulin lispro (HumaLOG) 100 units/mL subcutaneous injection 2-12 Units  2-12 Units Subcutaneous HS   • insulin lispro (HumaLOG) 100 units/mL subcutaneous injection 4 Units  4 Units Subcutaneous TID With Meals   • losartan (COZAAR) tablet 100 mg  100 mg Oral Daily   • metoprolol tartrate (LOPRESSOR) tablet 50 mg  50 mg Oral Q12H LUTHER   • nitroglycerin (NITROSTAT) SL tablet 0 4 mg  0 4 mg Sublingual Q5 Min PRN   • pravastatin (PRAVACHOL) tablet 20 mg  20 mg Oral Daily With Dinner   • sodium chloride (PF) 0 9 % injection 3 mL  3 mL Intravenous Q1H PRN   • spironolactone (ALDACTONE) tablet 25 mg  25 mg Oral Daily     Medications Prior to Admission   Medication   • allopurinol (ZYLOPRIM) 100 mg tablet   • ALPRAZolam (XANAX) 0 5 mg tablet   • amLODIPine (NORVASC) 5 mg tablet   • Aspirin Buf,CaCarb-MgCarb-MgO, 81 MG TABS   • Cholecalciferol (Vitamin D) 50 MCG (2000 UT) CAPS   • doxazosin (CARDURA) 4 mg tablet   • hydrALAZINE (APRESOLINE) 50 mg tablet   • hydrochlorothiazide (HYDRODIURIL) 25 mg tablet   • insulin glargine (LANTUS) 100 units/mL subcutaneous injection   • insulin lispro (HumaLOG) 100 units/mL injection   • losartan (COZAAR) 100 MG tablet   • metFORMIN (GLUCOPHAGE) 1000 MG tablet   • metoprolol tartrate (LOPRESSOR) 50 mg tablet   • Multiple Vitamin (MULTIVITAMIN ADULT PO)   • simvastatin (ZOCOR) 10 mg tablet   • spironolactone (ALDACTONE) 25 mg tablet   • Trulicity 8 52 FY/3 6BL SOPN   • rosuvastatin (CRESTOR) 10 MG tablet     No Known Allergies  Social History     Socioeconomic History   • Marital status: Single     Spouse name: None   • Number of children: None   • Years of education: None   • Highest education level: None   Occupational History   • None   Tobacco Use   • Smoking status: Some Days     Types: Cigars   • Smokeless tobacco: Current     Types: Chew   • Tobacco comments:     chewing tobacco occasionally    Vaping Use   • Vaping Use: Never used   Substance and Sexual Activity   • Alcohol use: Never   • Drug use: Never   • Sexual activity: Not Currently   Other Topics Concern   • None   Social History Narrative   • None     Social Determinants of Health     Financial Resource Strain: Not on file   Food Insecurity: Not on file   Transportation Needs: Not on file   Physical Activity: Not on file   Stress: Not on file   Social Connections: Not on file   Intimate Partner Violence: Not on file   Housing Stability: Not on file         Review of Systems   All other systems reviewed and are negative  Vitals:    02/06/23 0400 02/06/23 0430 02/06/23 0600 02/06/23 0800   BP: 117/69   129/75   BP Location: Left arm   Left arm   Pulse: 68   90   Resp: 18   16   Temp: 97 8 °F (36 6 °C)   98 6 °F (37 °C)   TempSrc: Tympanic   Oral   SpO2: 96% 96%  98%   Weight:   (!) 151 kg (332 lb 8 oz)    Height:         Orthostatic Blood Pressures    Flowsheet Row Most Recent Value   Blood Pressure 129/75 filed at 02/06/2023 0800   Patient Position - Orthostatic VS Sitting filed at 02/06/2023 0800        Body mass index is 49 1 kg/m²  Wt Readings from Last 5 Encounters:   02/06/23 (!) 151 kg (332 lb 8 oz)   08/17/22 (!) 151 kg (332 lb 14 3 oz)   04/12/22 (!) 150 kg (330 lb)   01/11/22 (!) 151 kg (333 lb 4 oz)   10/19/21 (!) 152 kg (335 lb)     I/O last 3 completed shifts: In: 500 [IV Piggyback:500]  Out: -       Physical Exam  Vitals and nursing note reviewed  Constitutional:       General: He is not in acute distress  Appearance: He is well-developed  He is obese  He is not ill-appearing or diaphoretic  HENT:      Head: Normocephalic and atraumatic  Nose: No congestion  Eyes:      General: No scleral icterus  Conjunctiva/sclera: Conjunctivae normal    Neck:      Vascular: No carotid bruit or JVD  Cardiovascular:      Rate and Rhythm: Normal rate and regular rhythm  Heart sounds: Normal heart sounds  No murmur heard  No friction rub  No gallop  Pulmonary:      Effort: Pulmonary effort is normal  No respiratory distress  Breath sounds: Normal breath sounds  No wheezing or rales     Chest: Chest wall: No tenderness  Abdominal:      General: There is no distension  Palpations: Abdomen is soft  Tenderness: There is no abdominal tenderness  Musculoskeletal:         General: No swelling, tenderness or deformity  Cervical back: Neck supple  No muscular tenderness  Right lower leg: No edema  Left lower leg: No edema  Skin:     General: Skin is warm  Neurological:      General: No focal deficit present  Mental Status: He is alert and oriented to person, place, and time  Mental status is at baseline  Psychiatric:         Mood and Affect: Mood normal          Behavior: Behavior normal          Thought Content: Thought content normal              Labs:  Results from last 7 days   Lab Units 02/06/23  0513 02/05/23  1203   WBC Thousand/uL 5 25 6 24   HEMOGLOBIN g/dL 13 1 13 1   HEMATOCRIT % 38 9 37 7   RDW % 13 2 13 1   PLATELETS Thousands/uL 146* 141*     Results from last 7 days   Lab Units 02/06/23  0513 02/05/23  1203   POTASSIUM mmol/L 3 8 4 2   CHLORIDE mmol/L 102 98   CO2 mmol/L 24 23   MAGNESIUM mg/dL 1 9  --    BUN mg/dL 25 29*   CREATININE mg/dL 1 40* 1 50*   CALCIUM mg/dL 9 1 9 1   AST U/L 34  --    ALT U/L 43  --    ALK PHOS U/L 59  --          Results from last 7 days   Lab Units 02/05/23  1203   HDL mg/dL 32*         Results from last 7 days   Lab Units 02/05/23  1756   HEMOGLOBIN A1C % 11 0*         Imaging: X-ray chest 1 view portable    Result Date: 2/5/2023  Narrative: CHEST INDICATION:   chest pain  COMPARISON:  Chest radiograph January 6, 2015 EXAM PERFORMED/VIEWS:  XR CHEST PORTABLE FINDINGS: Cardiomediastinal silhouette appears unremarkable  The lungs are clear  No pneumothorax or pleural effusion  Osseous structures appear within normal limits for patient age  Impression: No acute cardiopulmonary disease  Workstation performed: [id number]       Cardiac testing:   No results found for this or any previous visit      No results found for this or any previous visit  No results found for this or any previous visit  No results found for this or any previous visit

## 2023-02-06 NOTE — ASSESSMENT & PLAN NOTE
Lab Results   Component Value Date    HGBA1C 11 0 (H) 02/05/2023       Recent Labs     02/05/23  2043 02/06/23  0708 02/06/23  1125 02/06/23  1701   POCGLU 323* 168* 255* 303*       Blood Sugar Average: Last 72 hrs:  (P) 259 8   · Restart Home Regimen upon discharge:  · Humalog 4 units TID with Meals, Lantus 20 units qHS  · Will Hold Metformin and Trulicitiy while admitted   · Updated HgbA1c: 11  · With uncontrolled diabetes  · Recommend outpatient follow up with PCP for further management  · Recommend keeping glucose log on discharge to discuss with PCP  · Continue Diabetic diet upon discharge

## 2023-02-06 NOTE — ASSESSMENT & PLAN NOTE
· Likely pseudohyponatremia in the setting of Hyperglycemia  · Na resolved with improvement in Hyperglycemia

## 2023-02-06 NOTE — PLAN OF CARE
Problem: CARDIOVASCULAR - ADULT  Goal: Maintains optimal cardiac output and hemodynamic stability  Description: INTERVENTIONS:  - Monitor I/O, vital signs and rhythm  - Monitor for S/S and trends of decreased cardiac output  - Administer and titrate ordered vasoactive medications to optimize hemodynamic stability  - Assess quality of pulses, skin color and temperature  - Assess for signs of decreased coronary artery perfusion  - Instruct patient to report change in severity of symptoms  Outcome: Progressing  Goal: Absence of cardiac dysrhythmias or at baseline rhythm  Description: INTERVENTIONS:  - Continuous cardiac monitoring, vital signs, obtain 12 lead EKG if ordered  - Administer antiarrhythmic and heart rate control medications as ordered  - Monitor electrolytes and administer replacement therapy as ordered  Outcome: Progressing     Problem: PAIN - ADULT  Goal: Verbalizes/displays adequate comfort level or baseline comfort level  Description: Interventions:  - Encourage patient to monitor pain and request assistance  - Assess pain using appropriate pain scale  - Administer analgesics based on type and severity of pain and evaluate response  - Implement non-pharmacological measures as appropriate and evaluate response  - Consider cultural and social influences on pain and pain management  - Notify physician/advanced practitioner if interventions unsuccessful or patient reports new pain  Outcome: Progressing     Problem: INFECTION - ADULT  Goal: Absence or prevention of progression during hospitalization  Description: INTERVENTIONS:  - Assess and monitor for signs and symptoms of infection  - Monitor lab/diagnostic results  - Monitor all insertion sites, i e  indwelling lines, tubes, and drains  - Monitor endotracheal if appropriate and nasal secretions for changes in amount and color  - Fox appropriate cooling/warming therapies per order  - Administer medications as ordered  - Instruct and encourage patient and family to use good hand hygiene technique  - Identify and instruct in appropriate isolation precautions for identified infection/condition  Outcome: Progressing  Goal: Absence of fever/infection during neutropenic period  Description: INTERVENTIONS:  - Monitor WBC    Outcome: Progressing     Problem: SAFETY ADULT  Goal: Patient will remain free of falls  Description: INTERVENTIONS:  - Educate patient/family on patient safety including physical limitations  - Instruct patient to call for assistance with activity   - Consult OT/PT to assist with strengthening/mobility   - Keep Call bell within reach  - Keep bed low and locked with side rails adjusted as appropriate  - Keep care items and personal belongings within reach  - Initiate and maintain comfort rounds  - Make Fall Risk Sign visible to staff  - Offer Toileting every 2 Hours, in advance of need  - Initiate/Maintain bed alarm    - Apply yellow socks and bracelet for high fall risk patients  - Consider moving patient to room near nurses station  Outcome: Progressing  Goal: Maintain or return to baseline ADL function  Description: INTERVENTIONS:  -  Assess patient's ability to carry out ADLs; assess patient's baseline for ADL function and identify physical deficits which impact ability to perform ADLs (bathing, care of mouth/teeth, toileting, grooming, dressing, etc )  - Assess/evaluate cause of self-care deficits   - Assess range of motion  - Assess patient's mobility; develop plan if impaired  - Assess patient's need for assistive devices and provide as appropriate  - Encourage maximum independence but intervene and supervise when necessary  - Involve family in performance of ADLs  - Assess for home care needs following discharge   - Consider OT consult to assist with ADL evaluation and planning for discharge  - Provide patient education as appropriate  Outcome: Progressing  Goal: Maintains/Returns to pre admission functional level  Description: INTERVENTIONS:  - Perform BMAT or MOVE assessment daily    - Set and communicate daily mobility goal to care team and patient/family/caregiver  - Collaborate with rehabilitation services on mobility goals if consulted  - Perform Range of Motion 2 times a day  - Reposition patient every 2 hours  - Dangle patient 2 times a day  - Stand patient 2 times a day  - Ambulate patient 2 times a day  - Out of bed to chair 2 times a day   - Out of bed for meals 2 times a day  - Out of bed for toileting  - Record patient progress and toleration of activity level   Outcome: Progressing     Problem: DISCHARGE PLANNING  Goal: Discharge to home or other facility with appropriate resources  Description: INTERVENTIONS:  - Identify barriers to discharge w/patient and caregiver  - Arrange for needed discharge resources and transportation as appropriate  - Identify discharge learning needs (meds, wound care, etc )  - Arrange for interpretive services to assist at discharge as needed  - Refer to Case Management Department for coordinating discharge planning if the patient needs post-hospital services based on physician/advanced practitioner order or complex needs related to functional status, cognitive ability, or social support system  Outcome: Progressing     Problem: Knowledge Deficit  Goal: Patient/family/caregiver demonstrates understanding of disease process, treatment plan, medications, and discharge instructions  Description: Complete learning assessment and assess knowledge base    Interventions:  - Provide teaching at level of understanding  - Provide teaching via preferred learning methods  Outcome: Progressing

## 2023-02-06 NOTE — UTILIZATION REVIEW
Initial Clinical Review    Admission: Date/Time/Statement:   Admission Orders (From admission, onward)     Ordered        02/05/23 1512  Place in Observation  Once                   Orders Placed This Encounter   Procedures   • Place in Observation     Standing Status:   Standing     Number of Occurrences:   1     Order Specific Question:   Level of Care     Answer:   Med Surg [16]     ED Arrival Information     Expected   -    Arrival   2/5/2023 11:40    Acuity   Emergent            Means of arrival   Walk-In    Escorted by   Friend    Service   Hospitalist    Admission type   Emergency            Arrival complaint   CP         Chief Complaint   Patient presents with   • Chest Pain     Chest pain that started last night and was relieved with Xanax, came back again this am and now has some SOB     Initial Presentation:   49 y/o male with PMHx Obesity, HTN, HLD, SANJIV on BiPAP, DM, CKD stage 3,  anxiety - presents to  [facility] ED on 2/5/23 2nd 1 day history of intermittent dull midsternal chest pain with mild dyspnea with some improvement after taking a xanax  IN ED - /90 Exam negative EKG shows NSR, LAD + non-specific IVCD, no acute ST-T changes  Troponin negative      ED Tx: IVF NSS x 500 cc  Placed in Observation 2/5/23 at 1512 2nd Chest Pain r/o ACS - Cardiology consulted        ED Triage Vitals   Temperature Pulse Respirations Blood Pressure SpO2   02/05/23 1153 02/05/23 1153 02/05/23 1153 02/05/23 1153 02/05/23 1153   98 4 °F (36 9 °C) 84 20 160/90 100 %      Temp Source Heart Rate Source Patient Position - Orthostatic VS BP Location FiO2 (%)   02/05/23 1153 02/05/23 1358 02/05/23 1358 02/05/23 1358 --   Oral Monitor Lying Left arm       Pain Score       02/05/23 1146       5          Wt Readings from Last 1 Encounters:   02/06/23 (!) 151 kg (332 lb 8 oz)     Additional Vital Signs:   Date/Time Temp Pulse Resp BP MAP (mmHg) SpO2 O2 Device O2 Interface Device Patient Position - Orthostatic VS   02/06/23 0800 98 6 °F (37 °C) 90 16 129/75 -- 98 % -- -- Sitting   02/06/23 0430 -- -- -- -- -- 96 % -- Face mask --   02/06/23 0400 97 8 °F (36 6 °C) 68 18 117/69 88 96 % None (Room air) -- Lying   02/05/23 2340 97 5 °F (36 4 °C) 70 20 144/74 105 99 % None (Room air) -- Sitting   02/05/23 2300 -- -- -- -- -- 96 % None (Room air) Face mask --   02/05/23 1950 98 3 °F (36 8 °C) 76 19 114/72 -- 97 % -- -- Sitting   02/05/23 1749 -- -- -- 117/84 -- -- -- -- --   02/05/23 1540 98 2 °F (36 8 °C) 76 18 134/76 -- 97 % -- -- Sitting   02/05/23 1508 -- 83 18 164/91 -- 100 % None (Room air) -- Lying   02/05/23 1432 -- 74 18 160/85 -- 100 % None (Room air) -- Lying   02/05/23 1358 -- 74 20 144/100 -- 96 % None (Room air) -- Lying   02/05/23 1153 98 4 °F (36 9 °C) 84 20 160/90 -- 100 % None (Room air) -- --      02/05 0701 02/06 0700 02/06 0701 02/07 0700   P  O   240   IV Piggyback 500    Total Intake(mL/kg) 500 (3 3) 240 (1 6)   Net +500 +240        Unmeasured Urine Occurrence  2 x     Pertinent Labs/Diagnostic Test Results:   2/5/ 23 - 12 LEAD EKG  Normal sinus rhythm  Left axis deviation  Non-specific intra-ventricular conduction block  Abnormal ECG  No previous ECGs available    X-ray chest 1 view portable   No acute cardiopulmonary disease            Results from last 7 days   Lab Units 02/06/23  0513 02/05/23  1203   WBC Thousand/uL 5 25 6 24   HEMOGLOBIN g/dL 13 1 13 1   HEMATOCRIT % 38 9 37 7   PLATELETS Thousands/uL 146* 141*   NEUTROS ABS Thousands/µL 3 28 4 66     Results from last 7 days   Lab Units 02/06/23  0513 02/05/23  1203   SODIUM mmol/L 136 130*   POTASSIUM mmol/L 3 8 4 2   CHLORIDE mmol/L 102 98   CO2 mmol/L 24 23   ANION GAP mmol/L 10 9   BUN mg/dL 25 29*   CREATININE mg/dL 1 40* 1 50*   EGFR ml/min/1 73sq m 58 53   CALCIUM mg/dL 9 1 9 1   MAGNESIUM mg/dL 1 9  --      Results from last 7 days   Lab Units 02/06/23  0513   AST U/L 34   ALT U/L 43   ALK PHOS U/L 59   TOTAL PROTEIN g/dL 6 9   ALBUMIN g/dL 4 1   TOTAL BILIRUBIN mg/dL 0 56     Results from last 7 days   Lab Units 02/06/23  0708 02/05/23  2043 02/05/23  1645   POC GLUCOSE mg/dl 168* 323* 250*     Results from last 7 days   Lab Units 02/06/23  0513 02/05/23  1203   GLUCOSE RANDOM mg/dL 158* 332*     Results from last 7 days   Lab Units 02/05/23  1756   HEMOGLOBIN A1C % 11 0*   EAG mg/dl 269     Results from last 7 days   Lab Units 02/05/23  1631 02/05/23  1400 02/05/23  1203   HS TNI 0HR ng/L  --   --  14   HS TNI 2HR ng/L  --  14  --    HSTNI D2 ng/L  --  0  --    HS TNI 4HR ng/L 17  --   --    HSTNI D4 ng/L 3  --   --      Results from last 7 days   Lab Units 02/06/23  0513   D-DIMER QUANTITATIVE ug/ml FEU 0 49     Results from last 7 days   Lab Units 02/05/23  1203   BNP pg/mL 17     ED Treatment:   Medication Administration from 02/05/2023 1140 to 02/05/2023 1531       Date/Time Order Dose Route Action     02/05/2023 1358 EST sodium chloride 0 9 % bolus 500 mL 0 mL Intravenous Stopped     02/05/2023 1249 EST sodium chloride 0 9 % bolus 500 mL 500 mL Intravenous New Bag     Past Medical History:   Diagnosis Date   • Anxiety    • Diabetes mellitus ([place] [address] )    • High cholesterol    • Hypertension    • Sleep apnea, obstructive      Present on Admission:  • Hyponatremia  • Morbid obesity (HCC)  • SANJIV (obstructive sleep apnea)  • Type 2 diabetes mellitus ([place] [address] )  • Hyperlipidemia  • Hypertension    Admitting Diagnosis: Chest pain [R07 9]    Age/Sex: 50 y o  male    Admission Orders:  Telemetry  ST Segment Monitoring  VS q4hrs  Continuous Pulse Oximetry  SCD  Up with assistance  Diet Cardiovascular;  Sodium 2 GM; Consistent Carbohydrate Diet Level 2 (5 carb servings/75 grams CHO/meal), Fluid Restriction 1800 ML   BBG qid before meals + BT  I+O q shift  Daily weight  Repeat EKG prn chest pain  Serial HS Troponin q2hrs x 3    Scheduled Medications:  allopurinol, 100 mg, Oral, Daily  amLODIPine, 5 mg, Oral, Daily  aspirin, 81 mg, Oral, Daily  doxazosin, 4 mg, Oral, Daily  heparin (porcine), 7,500 Units, Subcutaneous, Q8H LUTHER  hydrALAZINE, 50 mg, Oral, BID  hydrochlorothiazide, 25 mg, Oral, Daily  insulin glargine, 20 Units, Subcutaneous, HS  insulin lispro, 2-12 Units, Subcutaneous, TID AC  insulin lispro, 2-12 Units, Subcutaneous, HS  insulin lispro, 4 Units, Subcutaneous, TID With Meals  losartan, 100 mg, Oral, Daily  metoprolol tartrate, 50 mg, Oral, Q12H LUTHER  pravastatin, 20 mg, Oral, Daily With Dinner  spironolactone, 25 mg, Oral, Daily    Continuous IV Infusions:  NONE    PRN Meds:  acetaminophen, 650 mg, Oral, Q6H PRN  nitroglycerin, 0 4 mg, Sublingual, Q5 Min PRN  sodium chloride (PF), 3 mL, Intravenous, Q1H PRN    IP CONSULT TO CARDIOLOGY    Network Utilization Review Department  ATTENTION: Please call with any questions or concerns to [phone] and carefully listen to the prompts so that you are directed to the right person  All voicemails are confidential   Estrellita Kent all requests for admission clinical reviews, approved or denied determinations and any other requests to dedicated fax number below belonging to the campus where the patient is receiving treatment   List of dedicated fax numbers for the Facilities:  [address] DENIALS (Administrative/Medical Necessity) [phone]   [address] (Maternity/NICU/Pediatrics) [phone]   [address] [phone]   [place] [address] [phone]   [address] [place] [id number] [place] [address] 28 [phone] [address] [place] Salma [facility] [address] [facility] [phone]

## 2023-02-06 NOTE — ASSESSMENT & PLAN NOTE
Lab Results   Component Value Date    EGFR 58 02/06/2023    EGFR 53 02/05/2023    EGFR 52 11/05/2022    CREATININE 1 40 (H) 02/06/2023    CREATININE 1 50 (H) 02/05/2023    CREATININE 1 53 (H) 11/05/2022     · Baseline Crt appears to be around 1 4-1 5  · Currently stable

## 2023-02-06 NOTE — ASSESSMENT & PLAN NOTE
Patient presented after experiencing chest pain yesterday evening  Patient stated he took a Xanax with resolution of chest pain  Chest pain began again this morning and reported to the ED  · Likely component of anxiety, vs GERD/GI related vs R/O ACS  · CXR: No acute cardiopulmonary disease  · HS Trop: 14->14  · BNP/D-dimer: WNL  · EKG: NSR with possible LVH  No acute ST-T wave abnormalities    · JAI: 2  · Updated Lipid panel: Elevated triglycerides  Low HDL  · HgbA1c: 11  · Echo: EF 60% mild concentric hypertrophy  Normal diastolic function  · Telemetry reviewed: NSR with no acute events overnight  · No further episodes of CP noted while admitted  · Cardiology Consulted  · GERD/GI related versus CAD  · Recommend short interval outpatient nuclear Lexiscan stress test upon discharge, orders placed  · Recommend 4-week trial of PPI in the interim as well    · Recommend outpatient follow-up with primary cardiologist upon discharge

## 2023-02-06 NOTE — DISCHARGE SUMMARY
[address] [address]  Discharge- Harpreet Fuentes 1975, 50 y o  male MRN: [id number]  Unit/Bed#: -01 Encounter: [id number]  Primary Care Provider: Lalo Merrill MD   Date and time admitted to hospital: 2/5/2023 11:43 AM    * Chest pain  Assessment & Plan  Patient presented after experiencing chest pain yesterday evening  Patient stated he took a Xanax with resolution of chest pain  Chest pain began again this morning and reported to the ED  · Likely component of anxiety, vs GERD/GI related vs R/O ACS  · CXR: No acute cardiopulmonary disease  · HS Trop: 14->14  · BNP/D-dimer: WNL  · EKG: NSR with possible LVH  No acute ST-T wave abnormalities    · JAI: 2  · Updated Lipid panel: Elevated triglycerides  Low HDL  · HgbA1c: 11  · Echo: EF 60% mild concentric hypertrophy  Normal diastolic function  · Telemetry reviewed: NSR with no acute events overnight  · No further episodes of CP noted while admitted  · Cardiology Consulted  · GERD/GI related versus CAD  · Recommend short interval outpatient nuclear Lexiscan stress test upon discharge, orders placed  · Recommend 4-week trial of PPI in the interim as well    · Recommend outpatient follow-up with primary cardiologist upon discharge    Hyponatremia  Assessment & Plan  · Likely pseudohyponatremia in the setting of Hyperglycemia  · Na resolved with improvement in Hyperglycemia    Type 2 diabetes mellitus [facility])  Assessment & Plan  Lab Results   Component Value Date    HGBA1C 11 0 (H) 02/05/2023       Recent Labs     02/05/23  2043 02/06/23  0708 02/06/23  1125 02/06/23  1701   POCGLU 323* 168* 255* 303*       Blood Sugar Average: Last 72 hrs:  (P) 259 8   · Restart Home Regimen upon discharge:  · Humalog 4 units TID with Meals, Lantus 20 units qHS  · Will Hold Metformin and Trulicitiy while admitted   · Updated HgbA1c: 11  · With uncontrolled diabetes  · Recommend outpatient follow up with PCP for further management  · Recommend keeping glucose log on discharge to discuss with PCP  · Continue Diabetic diet upon discharge    Stage 3a chronic kidney disease [facility])  Assessment & Plan  Lab Results   Component Value Date    EGFR 58 02/06/2023    EGFR 53 02/05/2023    EGFR 52 11/05/2022    CREATININE 1 40 (H) 02/06/2023    CREATININE 1 50 (H) 02/05/2023    CREATININE 1 53 (H) 11/05/2022     · Baseline Crt appears to be around 1 4-1 5  · Currently stable    Hypertension  Assessment & Plan  · BP reviewed and currently stable  · Continue Home Medication:  · Amlodipine, Hydralazine, Cardura, Losartan, Metoprolol, HCTZ, and Spirolactone    Hyperlipidemia  Assessment & Plan  · Continue Statin therapy  · Simvastatin transitioned to Pravastatin per Hospital formulary    SANJIV (obstructive sleep apnea)  Assessment & Plan  · Continue outpatient follow up with pulmonology, Dr Alysa Hyman  · Continue BiPap qHS    Morbid obesity ([address] )  Assessment & Plan  · Educated on lifestyle modification, including diet and exercise        Medical Problems     Resolved Problems  Date Reviewed: 2/6/2023   None       Discharging Physician / Practitioner: SONA Andres  PCP: Kecia Bob MD  Admission Date:   Admission Orders (From admission, onward)     Ordered        02/05/23 1512  Place in Observation  Once                      Discharge Date: 02/06/23    Consultations During Hospital Stay:  · Cardiology    Procedures Performed:   · None    Significant Findings / Test Results:   · HgbA1C: 11 0  · Lipid Panel: elevated Triglycerides    Incidental Findings:   · None    Test Results Pending at Discharge (will require follow up):    · None     Outpatient Tests Requested:  · Recommend outpatient follow-up with PCP  · Recommend outpatient follow-up with cardiology    Complications: Hyperglycemia    Reason for Admission: Chest pain    Hospital Course:   Miranda Gallegos is a 50 y o  male patient with a PMH of anxiety, hyperlipidemia, hypertension, SANJIV on BiPAP, and diabetes who originally presented to the hospital on 2/5/2023 due to complaints of chest pain that started the day prior to admission  Patient dated he finished a long day at work started to feel anxious distress of the evening when the chest pain began  Patient states took a Xanax with resolution of the pain at the time  Then the morning of admission, patient with recurrent chest pain and presented to the ED for further evaluation  Upon arrival to the ED, vitals with mild hypertension and labs remarkable for hyponatremia, hyperglycemia, and elevated renal function  Patient was given 500 mL bolus  Patient with no episodes of chest pain while in the ED  Due to high risk, patient was admitted observation for further evaluation of chest pain  Cardiology was consulted  Patient remained free of chest pain while admitted  Troponin's remain negative  EKGs and telemetry readings revealed normal sinus rhythm with possible LVH and no acute arrhythmias noted overnight  Lipid panel revealed elevated triglycerides and updated hemoglobin A1c greater than 11  Echocardiogram was completed and revealed EF 60% with mild concentric hypertrophy and normal diastolic function  Discussion with cardiology, likely GERD/GI versus CAD  Recommend short interval outpatient nuclear Klarissa scan stress test upon discharge  Recommend 4-week trial of PPI in the interim and outpatient follow-up with primary cardiologist upon discharge  With uncontrolled diabetes, educated on the importance of diabetic regiment and need for close carb control while outpatient  Glucose improved with initiation of insulin regiment  Recommended restarting home diabetic regiment upon discharge and close outpatient follow-up with PCP for further management of uncontrolled diabetes  Discussed plan with patient at bedside, all questions were answered  Please see above list of diagnoses and related plan for additional information       Condition at Discharge: stable    Discharge Day Visit / Exam:   Subjective: Patient denies any active chest pain, shortness breath, abdominal pain, nausea, vomiting or diarrhea  Vitals: Blood Pressure: 143/87 (02/06/23 1710)  Pulse: 74 (02/06/23 1710)  Temperature: 97 8 °F (36 6 °C) (02/06/23 1710)  Temp Source: Oral (02/06/23 1710)  Respirations: 16 (02/06/23 1710)  Height: 5' 9" (175 3 cm) (02/06/23 1137)  Weight - Scale: (!) 151 kg (332 lb) (02/06/23 1137)  SpO2: 96 % (02/06/23 1137)  Exam:   Physical Exam  Vitals and nursing note reviewed  Constitutional:       General: He is not in acute distress  Appearance: He is obese  HENT:      Head: Normocephalic  Nose: Nose normal  No congestion  Mouth/Throat:      Mouth: Mucous membranes are moist       Pharynx: Oropharynx is clear  Cardiovascular:      Rate and Rhythm: Normal rate and regular rhythm  Pulses: Normal pulses  Heart sounds: No murmur heard  Pulmonary:      Effort: Pulmonary effort is normal  No respiratory distress  Breath sounds: Decreased breath sounds present  Abdominal:      General: Bowel sounds are normal  There is no distension  Palpations: Abdomen is soft  Tenderness: There is no abdominal tenderness  Musculoskeletal:         General: Normal range of motion  Cervical back: Normal range of motion  Right lower leg: No edema  Left lower leg: No edema  Skin:     General: Skin is warm and dry  Capillary Refill: Capillary refill takes less than 2 seconds  Neurological:      Mental Status: He is alert and oriented to person, place, and time  Mental status is at baseline  Motor: No weakness  Psychiatric:         Mood and Affect: Mood normal          Speech: Speech normal          Behavior: Behavior is cooperative  Discussion with Family: Patient declined call to        Discharge instructions/Information to patient and family:   See after visit summary for information provided to patient and family  Provisions for Follow-Up Care:  See after visit summary for information related to follow-up care and any pertinent home health orders  Disposition:   Home    Planned Readmission: No     Discharge Statement:  I spent 60 minutes discharging the patient  This time was spent on the day of discharge  I had direct contact with the patient on the day of discharge  Greater than 50% of the total time was spent examining patient, answering all patient questions, arranging and discussing plan of care with patient as well as directly providing post-discharge instructions  Additional time then spent on discharge activities  Discharge Medications:  See after visit summary for reconciled discharge medications provided to patient and/or family        **Please Note: This note may have been constructed using a voice recognition system**

## 2023-02-06 NOTE — ASSESSMENT & PLAN NOTE
· BP reviewed and currently stable  · Continue Home Medication:  · Amlodipine, Hydralazine, Cardura, Losartan, Metoprolol, HCTZ, and Spirolactone

## 2023-02-14 ENCOUNTER — HOSPITAL ENCOUNTER (OUTPATIENT)
Facility: HOSPITAL | Age: 48
Discharge: HOME | End: 2023-02-14
Attending: INTERNAL MEDICINE

## 2023-02-14 ENCOUNTER — HOSPITAL ENCOUNTER (OUTPATIENT)
Dept: NON INVASIVE DIAGNOSTICS | Facility: HOSPITAL | Age: 48
Discharge: HOME | End: 2023-02-14
Attending: INTERNAL MEDICINE

## 2023-02-14 VITALS — WEIGHT: 315 LBS | BODY MASS INDEX: 46.65 KG/M2 | HEIGHT: 69 IN

## 2023-02-14 LAB
CHEST PAIN STATEMENT: NORMAL
MAX DIASTOLIC BP: 74 MMHG
MAX HEART RATE: 105 BPM
MAX HR: 105 BPM
MAX PREDICTED HEART RATE: 171 BPM
MAX. SYSTOLIC BP: 122 MMHG
NUC STRESS EJECTION FRACTION: 52 %
PROTOCOL NAME: NORMAL
RATE PRESSURE PRODUCT: NORMAL
REASON FOR TERMINATION: NORMAL
SL CV REST NUCLEAR ISOTOPE DOSE: 14.9 MCI
SL CV STRESS NUCLEAR ISOTOPE DOSE: 49 MCI
SL CV STRESS RECOVERY BP: 118 MMHG
SL CV STRESS RECOVERY HR: 85 BPM
SL CV STRESS RECOVERY O2 SAT: 70 %
STRESS ANGINA INDEX: 0
STRESS BASELINE BP: NORMAL MMHG
STRESS BASELINE HR: 69 BPM
STRESS DUKE TREADMILL SCORE: 3
STRESS O2 SAT REST: 98 %
STRESS PEAK HR: 105 BPM
STRESS POST EXERCISE DUR MIN: 3 MIN
STRESS POST O2 SAT PEAK: 99 %
STRESS POST PEAK BP: 110 MMHG
STRESS ST DEPRESSION: 0 MM
STRESS/REST PERFUSION RATIO: 1
TARGET HR FORMULA: NORMAL
TEST INDICATION: NORMAL
TIME IN EXERCISE PHASE: NORMAL

## 2023-02-14 RX ADMIN — REGADENOSON 0.4 MG: 0.08 INJECTION, SOLUTION INTRAVENOUS at 09:02

## 2023-02-27 ENCOUNTER — APPOINTMENT (OUTPATIENT)
Dept: LAB | Facility: HOSPITAL | Age: 48
End: 2023-02-27
Attending: INTERNAL MEDICINE

## 2023-02-27 ENCOUNTER — HOSPITAL ENCOUNTER (OUTPATIENT)
Dept: CT IMAGING | Facility: HOSPITAL | Age: 48
Discharge: HOME | End: 2023-02-27
Attending: INTERNAL MEDICINE

## 2023-02-27 DIAGNOSIS — R10.31 ABDOMINAL PAIN, RIGHT LOWER QUADRANT: ICD-10-CM

## 2023-02-27 DIAGNOSIS — N20.0 CALCULUS OF KIDNEY: ICD-10-CM

## 2023-02-28 LAB
BACTERIA UR CULT: ABNORMAL
BACTERIA UR CULT: ABNORMAL

## 2023-03-28 ENCOUNTER — HOSPITAL ENCOUNTER (OUTPATIENT)
Dept: MRI IMAGING | Facility: HOSPITAL | Age: 48
Discharge: HOME | End: 2023-03-28
Attending: INTERNAL MEDICINE

## 2023-03-28 DIAGNOSIS — K86.89 PANCREATIC MASS: ICD-10-CM

## 2023-03-28 RX ADMIN — GADOBUTROL 15 ML: 604.72 INJECTION INTRAVENOUS at 13:30

## 2023-07-11 ENCOUNTER — OFFICE VISIT (OUTPATIENT)
Dept: PULMONOLOGY | Facility: CLINIC | Age: 48
End: 2023-07-11
Payer: COMMERCIAL

## 2023-07-11 VITALS
HEIGHT: 69 IN | DIASTOLIC BLOOD PRESSURE: 84 MMHG | OXYGEN SATURATION: 97 % | TEMPERATURE: 98.6 F | SYSTOLIC BLOOD PRESSURE: 124 MMHG | WEIGHT: 315 LBS | HEART RATE: 87 BPM | BODY MASS INDEX: 46.65 KG/M2

## 2023-07-11 DIAGNOSIS — E66.01 MORBID OBESITY (HCC): ICD-10-CM

## 2023-07-11 DIAGNOSIS — G47.33 OSA (OBSTRUCTIVE SLEEP APNEA): Primary | ICD-10-CM

## 2023-07-11 DIAGNOSIS — I10 PRIMARY HYPERTENSION: ICD-10-CM

## 2023-07-11 PROCEDURE — 99213 OFFICE O/P EST LOW 20 MIN: CPT | Performed by: INTERNAL MEDICINE

## 2023-07-11 RX ORDER — INSULIN ASPART 100 [IU]/ML
INJECTION, SOLUTION INTRAVENOUS; SUBCUTANEOUS
COMMUNITY
Start: 2023-07-09

## 2023-07-11 RX ORDER — CALCIUM CITRATE/VITAMIN D3 200MG-6.25
TABLET ORAL 2 TIMES DAILY
COMMUNITY
Start: 2023-04-11

## 2023-07-11 RX ORDER — PEN NEEDLE, DIABETIC 32GX 5/32"
NEEDLE, DISPOSABLE MISCELLANEOUS
COMMUNITY
Start: 2023-07-10

## 2023-07-11 RX ORDER — ROSUVASTATIN CALCIUM 10 MG/1
10 TABLET, COATED ORAL DAILY
COMMUNITY
Start: 2023-06-19

## 2023-07-11 NOTE — ASSESSMENT & PLAN NOTE
Magalie Foster was diagnosed with obstructive sleep apnea more than 10 years back and was started on CPAP therapy. Richard Roberto has been using the CPAP regularly since then. [facility] last titration study was more than 5 years back. Richard Roberto  has been buying the supplies online.  Previously he was with the [address] [place] [address] used to follow with Dr. Darline Baeza. Richard Roberto has hypertension as comorbidity.  On clinical examination, he was morbidly obese and had Mallampati class 3 oropharyngeal crowding.   his repeat sleep study showed BiPAP requirement of 20/16 with a full face mask to correct his sleep apnea. He has been using the BIPAP regularly and his compliance has been excellent. His residual AHI was low. He is motivated to continue on BiPAP therapy. He is getting clinical benefit from BiPAP therapy. Currently he has  marks from the straps which are occasionally itchy. I have referred him for a mask fit.  I had a long discussion with him and I have answered all his questions.

## 2023-07-11 NOTE — PROGRESS NOTES
Assessment/Plan:    SANJIV (obstructive sleep apnea)  Mr.Joseph Fuentes was diagnosed with obstructive sleep apnea more than 10 years back and was started on CPAP therapy. [facility] has been using the CPAP regularly since then. [facility] last titration study was more than 5 years back. [facility]  has been buying the supplies online.  Previously he was with the [address] [place] [address] used to follow with Dr. Nataliya Owen. Spring rTiplett has hypertension as comorbidity.  On clinical examination, he was morbidly obese and had Mallampati class 3 oropharyngeal crowding.   his repeat sleep study showed BiPAP requirement of 20/16 with a full face mask to correct his sleep apnea. He has been using the BIPAP regularly and his compliance has been excellent. His residual AHI was low. He is motivated to continue on BiPAP therapy. He is getting clinical benefit from BiPAP therapy. Currently he has  marks from the straps which are occasionally itchy. I have referred him for a mask fit.  I had a long discussion with him and I have answered all his questions. Hypertension  He has history of hypertension and currently this is controlled with spironolactone metoprolol and losartan. He also has CKD. Morbid obesity ([address])  He is morbidly obese and understands need for weight reduction. He understands that weight reduction can significantly improve sleep apnea and other symptoms. Diagnoses and all orders for this visit:    SANJIV (obstructive sleep apnea)  -     Mask fitting only; Future    Primary hypertension    Morbid obesity (HCC)    Other orders  -     rosuvastatin (CRESTOR) 10 MG tablet; Take 10 mg by mouth daily  -     metFORMIN (GLUCOPHAGE) 500 mg tablet;  Take 500 mg by mouth 2 (two) times a day  -     BD Pen Needle Cece 2nd Gen 32G X 4 MM MISC  -     insulin aspart, w/niacinamide, (FIASP) 100 Units/mL injection pen; INJECT 20 UNITS UNDER SKIN THREE TIMES DAILY BEFORE EACH MEAL  -     insulin aspart (NovoLOG FlexPen) 100 UNIT/ML injection pen  - True Metrix Blood Glucose Test test strip; 2 (two) times a day Test as directed          Subjective:      Patient ID: Nadia Pablo is a 50 y.o. male. Mr. Bailey Chaves came for follow-up for his obstructive sleep apnea on CPAP therapy. Currently he is on BiPAP 20/16 using a full facemask. His daytime sleepiness and tiredness is improved and he sleeps uninterrupted. However he has noted red marks from the straps which are occasionally itchy. He denied any significant leak. I reviewed his BIPAP compliance records and they are excellent. His residual AHI was 1.5. He is getting clinical benefit from BIPAP therapy. 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 He has gained few pounds. He has hypertension and has been on treatment. He understands the need for weight reduction. He had questions about [facility] and I explained to him in detail. The following portions of the patient's history were reviewed and updated as appropriate: allergies, current medications, past family history, past medical history, past social history, past surgical history and problem list.    Review of Systems   Constitutional: Negative for appetite change, chills, fatigue and fever. HENT: Negative for hearing loss, rhinorrhea, sneezing, sore throat and trouble swallowing. Eyes: Negative for visual disturbance. Respiratory: Negative for cough, shortness of breath and wheezing. Cardiovascular: Negative for chest pain, palpitations and leg swelling. Gastrointestinal: Negative for abdominal pain, constipation, diarrhea, nausea and vomiting. Genitourinary: Negative for dysuria, frequency and urgency. Musculoskeletal: Positive for arthralgias. Negative for gait problem and joint swelling. Skin: Negative for rash. Allergic/Immunologic: Negative for environmental allergies. Neurological: Negative for dizziness, syncope and light-headedness. Psychiatric/Behavioral: Negative for agitation, confusion and sleep disturbance. The patient is not nervous/anxious. Objective:      /84   Pulse 87   Temp 98.6 °F (37 °C)   Ht 5' 9" (1.753 m)   Wt (!) 152 kg (334 lb)   SpO2 97%   BMI 49.32 kg/m²          Physical Exam  Vitals reviewed. Constitutional:       General: He is not in acute distress. Appearance: He is obese. He is not ill-appearing, toxic-appearing or diaphoretic. HENT:      Head: Normocephalic. Mouth/Throat:      Mouth: Mucous membranes are moist.      Comments: Mallampatti Class 3  Eyes:      General: No scleral icterus. Conjunctiva/sclera: Conjunctivae normal.   Cardiovascular:      Rate and Rhythm: Normal rate and regular rhythm. Heart sounds: Normal heart sounds. No murmur heard. Pulmonary:      Effort: Pulmonary effort is normal. No respiratory distress. Breath sounds: Normal breath sounds. No stridor. No wheezing, rhonchi or rales. Chest:      Chest wall: No tenderness. Abdominal:      General: Bowel sounds are normal.      Palpations: Abdomen is soft. Tenderness: There is no abdominal tenderness. There is no guarding. Musculoskeletal:      Cervical back: No rigidity. Right lower leg: No edema. Left lower leg: No edema. Lymphadenopathy:      Cervical: No cervical adenopathy. Skin:     Coloration: Skin is not jaundiced or pale. Findings: No rash. Neurological:      Mental Status: He is alert and oriented to person, place, and time. Gait: Gait normal.   Psychiatric:         Mood and Affect: Mood normal.         Behavior: Behavior normal.         Thought Content:  Thought content normal.         Judgment: Judgment normal.

## 2023-07-12 NOTE — ASSESSMENT & PLAN NOTE
He is morbidly obese and understands need for weight reduction. He understands that weight reduction can significantly improve sleep apnea and other symptoms.

## 2023-07-12 NOTE — ASSESSMENT & PLAN NOTE
He has history of hypertension and currently this is controlled with spironolactone metoprolol and losartan. He also has CKD.

## 2023-07-12 NOTE — PROGRESS NOTES
CPAP mask fit order sent to [facility] [employer] via [employer]. Documents uploaded.

## 2023-07-13 LAB
DME PARACHUTE DELIVERY DATE ACTUAL: NORMAL
DME PARACHUTE DELIVERY DATE REQUESTED: NORMAL
DME PARACHUTE ITEM DESCRIPTION: NORMAL
DME PARACHUTE ORDER STATUS: NORMAL
DME PARACHUTE SUPPLIER NAME: NORMAL
DME PARACHUTE SUPPLIER PHONE: NORMAL

## 2024-02-12 ENCOUNTER — OFFICE VISIT (OUTPATIENT)
Dept: PULMONOLOGY | Facility: CLINIC | Age: 49
End: 2024-02-12
Payer: COMMERCIAL

## 2024-02-12 VITALS
DIASTOLIC BLOOD PRESSURE: 88 MMHG | WEIGHT: 315 LBS | HEART RATE: 82 BPM | TEMPERATURE: 97.6 F | BODY MASS INDEX: 46.65 KG/M2 | HEIGHT: 69 IN | OXYGEN SATURATION: 96 % | SYSTOLIC BLOOD PRESSURE: 130 MMHG

## 2024-02-12 DIAGNOSIS — I10 PRIMARY HYPERTENSION: ICD-10-CM

## 2024-02-12 DIAGNOSIS — E66.01 MORBID OBESITY (HCC): ICD-10-CM

## 2024-02-12 DIAGNOSIS — G47.33 OSA (OBSTRUCTIVE SLEEP APNEA): Primary | ICD-10-CM

## 2024-02-12 PROCEDURE — 99213 OFFICE O/P EST LOW 20 MIN: CPT | Performed by: INTERNAL MEDICINE

## 2024-02-12 NOTE — PROGRESS NOTES
Assessment/Plan:    SANJIV (obstructive sleep apnea)  Mr.Joseph Fuentes was diagnosed with obstructive sleep apnea more than 10 years back and was started on CPAP therapy.  He has been using the CPAP regularly since then.  His last titration study was more than 5 years back.  He  has been buying the supplies online.  Previously he was with the [employer].  He used to follow with Dr. Merlos..  He has hypertension as comorbidity.  On clinical examination, he was morbidly obese and had Mallampati class 3 oropharyngeal crowding.   his repeat sleep study showed BiPAP requirement of 20/16 with a full face mask to correct his sleep apnea.  He has been using the BIPAP regularly and his compliance has been excellent.  His residual AHI was low.  He is motivated to continue on BiPAP therapy.  He is getting clinical benefit from BiPAP therapy.  I advised him to continue with CPAP therapy as before.  I had a long discussion with him and I have answered all his questions.       Hypertension  He has history of hypertension and currently this is controlled with spironolactone metoprolol and losartan. He also has CKD.     Morbid obesity (HCC)  He is morbidly obese and understands the need for weight reduction.  He understands that weight reduction can significantly improve his sleep apnea other symptoms .       Diagnoses and all orders for this visit:    SANJIV (obstructive sleep apnea)    Primary hypertension    Morbid obesity (HCC)    Other orders  -     Empagliflozin (Jardiance) 25 MG TABS; Take 25 mg by mouth daily  -     tirzepatide (Mounjaro) 5 MG/0.5ML; Mounjaro 5 mg/0.5 mL subcutaneous pen injector          Subjective:      Patient ID: Vel Fuentes is a 48 y.o. male.    Mr. Vel Fuentes came for follow-up for his obstructive sleep apnea on BiPAP therapy 20/16 cm of water.  He has been using the BiPAP regularly for at least 6 and half hours every night on an average.  He is comfortable with the mask and pressure  "and is very motivated to continue on BiPAP therapy.  He has no significant daytime sleepiness or morning headache.  His sleep is not significantly interrupted.  I reviewed his BiPAP compliance records and they are very good.  His residual AHI was low at 2.  He feels benefit from BiPAP therapy.  He is also morbidly obese and understands the need for weight reduction.  He also has hypertension and has been on treatment with amlodipine and spironolactone, losartan and hydrochlorothiazide.  He denied any significant shortness of breath cough or phlegm or wheeze or chest pain        The following portions of the patient's history were reviewed and updated as appropriate: allergies, current medications, past family history, past medical history, past social history, past surgical history, and problem list.    Review of Systems   Constitutional:  Negative for chills, fatigue and fever.   HENT:  Negative for hearing loss, rhinorrhea, sneezing, sore throat and trouble swallowing.    Eyes:  Negative for visual disturbance.   Respiratory:  Negative for cough, chest tightness, shortness of breath and wheezing.    Cardiovascular:  Negative for chest pain, palpitations and leg swelling.   Gastrointestinal:  Negative for abdominal pain, constipation, diarrhea, nausea and vomiting.   Genitourinary:  Negative for dysuria, frequency and urgency.   Musculoskeletal:  Positive for arthralgias. Negative for back pain and gait problem.   Skin:  Negative for rash.   Allergic/Immunologic: Negative for environmental allergies.   Neurological:  Negative for dizziness, syncope, light-headedness and headaches.   Psychiatric/Behavioral:  Negative for agitation, confusion and sleep disturbance. The patient is not nervous/anxious.          Objective:      /88   Pulse 82   Temp 97.6 °F (36.4 °C) (Tympanic)   Ht 5' 9\" (1.753 m)   Wt (!) 154 kg (338 lb 8 oz)   SpO2 96%   BMI 49.99 kg/m²          Physical Exam  Vitals reviewed. "   Constitutional:       General: He is not in acute distress.     Appearance: He is obese. He is not ill-appearing, toxic-appearing or diaphoretic.   HENT:      Head: Normocephalic.      Mouth/Throat:      Mouth: Mucous membranes are moist.      Comments: Oropharyngeal crowding.  Eyes:      General: No scleral icterus.     Conjunctiva/sclera: Conjunctivae normal.   Cardiovascular:      Rate and Rhythm: Normal rate and regular rhythm.      Heart sounds: Normal heart sounds. No murmur heard.  Pulmonary:      Effort: Pulmonary effort is normal. No respiratory distress.      Breath sounds: Normal breath sounds. No stridor. No wheezing, rhonchi or rales.   Chest:      Chest wall: No tenderness.   Abdominal:      General: Bowel sounds are normal.      Palpations: Abdomen is soft.      Tenderness: There is no abdominal tenderness. There is no guarding.   Musculoskeletal:      Cervical back: Neck supple. No rigidity.      Right lower leg: No edema.      Left lower leg: No edema.   Lymphadenopathy:      Cervical: No cervical adenopathy.   Skin:     Coloration: Skin is not jaundiced or pale.      Findings: No rash.   Neurological:      Mental Status: He is alert and oriented to person, place, and time.      Gait: Gait normal.   Psychiatric:         Mood and Affect: Mood normal.         Behavior: Behavior normal.         Thought Content: Thought content normal.         Judgment: Judgment normal.

## 2024-02-13 NOTE — ASSESSMENT & PLAN NOTE
He is morbidly obese and understands the need for weight reduction.  He understands that weight reduction can significantly improve his sleep apnea other symptoms .

## 2024-03-27 NOTE — ASSESSMENT & PLAN NOTE
Chronic, controlled. Latest blood pressure and vitals reviewed-     Temp:  [98.7 °F (37.1 °C)-98.8 °F (37.1 °C)]   Pulse:  []   Resp:  [18-20]   BP: (132-160)/(65-97)   SpO2:  [89 %-97 %] .   Home meds for hypertension were reviewed and noted below.   Hypertension Medications               carvediloL (COREG) 25 MG tablet Take 1 tablet (25 mg total) by mouth 2 (two) times daily with meals.            While in the hospital, will manage blood pressure as follows; Adjust home antihypertensive regimen as follows- hold antihypertensives currently his blood pressure is not significantly elevated we will trend    Will utilize p.r.n. blood pressure medication only if patient's blood pressure greater than 180/110 and she develops symptoms such as worsening chest pain or shortness of breath.   Mr.Joseph Fuentes was diagnosed with obstructive sleep apnea more than 10 years back and was started on CPAP therapy.  He has been using the CPAP regularly since then.  His last titration study was more than 5 years back.  He  has been buying the supplies online.  Previously he was with the [employer].  He had hypertension as comorbidity.  On clinical examination, he was morbidly obese and had Mallampati class 3 oropharyngeal crowding. His repeat sleep study showed BiPAP requirement of 20/16 with a full face mask to correct his sleep apnea.  He has been using the BIPAP regularly and his compliance has been very good.  His residual AHI was low.  He is motivated to continue on BiPAP therapy.  He is getting clinical benefit from BiPAP therapy.  I advised him to continue with CPAP therapy as before.  I had a long discussion with him and I have answered all his questions.

## 2024-09-24 ENCOUNTER — TELEPHONE (OUTPATIENT)
Age: 49
End: 2024-09-24

## 2024-09-24 NOTE — TELEPHONE ENCOUNTER
New Patient    What is the reason for the patient’s appointment?: NP calling to schedule appt for hx of kidney stones. Pt was previously seen by Dr Duarte years ago and would like to establish care at .    Pt scheduled for next available on 12/17 @ 10am.     What office location does the patient prefer?: [place]        INSURANCE:   Do we accept the patient's insurance or is the patient Self-Pay?:    Insurance Provider:[employer]   Plan Type/Number:   Member ID#: [id number]

## 2024-12-09 ENCOUNTER — CONSULT (OUTPATIENT)
Dept: ENDOCRINOLOGY | Facility: CLINIC | Age: 49
End: 2024-12-09
Payer: COMMERCIAL

## 2024-12-09 VITALS
SYSTOLIC BLOOD PRESSURE: 132 MMHG | OXYGEN SATURATION: 98 % | TEMPERATURE: 97.8 F | BODY MASS INDEX: 46.65 KG/M2 | WEIGHT: 315 LBS | HEART RATE: 79 BPM | DIASTOLIC BLOOD PRESSURE: 86 MMHG | HEIGHT: 69 IN

## 2024-12-09 DIAGNOSIS — E55.9 VITAMIN D DEFICIENCY: ICD-10-CM

## 2024-12-09 DIAGNOSIS — E66.01 MORBID OBESITY (HCC): ICD-10-CM

## 2024-12-09 DIAGNOSIS — Z79.4 TYPE 2 DIABETES MELLITUS WITH HYPERGLYCEMIA, WITH LONG-TERM CURRENT USE OF INSULIN (HCC): Primary | ICD-10-CM

## 2024-12-09 DIAGNOSIS — E11.65 TYPE 2 DIABETES MELLITUS WITH HYPERGLYCEMIA, WITH LONG-TERM CURRENT USE OF INSULIN (HCC): Primary | ICD-10-CM

## 2024-12-09 DIAGNOSIS — E29.1 HYPOGONADISM, MALE: ICD-10-CM

## 2024-12-09 DIAGNOSIS — N18.31 STAGE 3A CHRONIC KIDNEY DISEASE (HCC): ICD-10-CM

## 2024-12-09 DIAGNOSIS — E78.2 MIXED HYPERLIPIDEMIA: ICD-10-CM

## 2024-12-09 DIAGNOSIS — G47.33 OSA (OBSTRUCTIVE SLEEP APNEA): ICD-10-CM

## 2024-12-09 DIAGNOSIS — E11.9 TYPE 2 DIABETES MELLITUS (HCC): ICD-10-CM

## 2024-12-09 PROBLEM — E11.22 TYPE 2 DIABETES MELLITUS WITH STAGE 2 CHRONIC KIDNEY DISEASE, WITH LONG-TERM CURRENT USE OF INSULIN (HCC): Status: ACTIVE | Noted: 2022-08-15

## 2024-12-09 PROBLEM — N18.2 TYPE 2 DIABETES MELLITUS WITH STAGE 2 CHRONIC KIDNEY DISEASE, WITH LONG-TERM CURRENT USE OF INSULIN (HCC): Status: ACTIVE | Noted: 2022-08-15

## 2024-12-09 PROCEDURE — 99204 OFFICE O/P NEW MOD 45 MIN: CPT | Performed by: INTERNAL MEDICINE

## 2024-12-09 RX ORDER — DEXAMETHASONE 1 MG
TABLET ORAL
Qty: 1 TABLET | Refills: 0 | Status: SHIPPED | OUTPATIENT
Start: 2024-12-09

## 2024-12-09 RX ORDER — ACETAMINOPHEN 500 MG
500 TABLET ORAL EVERY 6 HOURS PRN
COMMUNITY

## 2024-12-09 RX ORDER — IBUPROFEN 200 MG
200 TABLET ORAL EVERY 6 HOURS PRN
COMMUNITY

## 2024-12-09 RX ORDER — TIRZEPATIDE 2.5 MG/.5ML
2.5 INJECTION, SOLUTION SUBCUTANEOUS WEEKLY
Qty: 2 ML | Refills: 0 | Status: SHIPPED | OUTPATIENT
Start: 2024-12-09

## 2024-12-09 RX ORDER — INSULIN GLARGINE 100 [IU]/ML
20 INJECTION, SOLUTION SUBCUTANEOUS
Qty: 30 ML | Refills: 0 | Status: SHIPPED | OUTPATIENT
Start: 2024-12-09

## 2024-12-09 NOTE — PATIENT INSTRUCTIONS
Instructions    Diet:   Take 1500 vin/day of balanced diet with 1/3rd carbs, 1/3rd protein and rest fiber and small amount fat.   Drink at least 64 oz fluids daily.    Lifestyle:   30 min brisk activity most days. 150min-220min/week of cardiac and muscle building exercise that causes sweating.  Consider [facility] medical fitness training program.  Medical fitness: follow these exercise links  Full Version - [email or web address]     Warmup - [email or web address]     Lower Body - [email or web address]     Upper Body - [email or web address]     Core -  [email or web address]    Fasting:  Can consider after becoming regular with exercise - 14 to 24 hrs fasting 1-3 times a week.    Medications:   look up Wegovy, Zepbound, saxenda - weight loss meds.  Consider switching from medications that cause weight gain to weight neutral medications.    Other:   Make sure you are treated appropriately if you have sleep apnea.  May consider bariatric surgery if we dont see benefit over 6-12 months of all above.

## 2024-12-09 NOTE — PROGRESS NOTES
"    New Consult Note      CC: Diabetes    History of Present Illness:   49 yr male with T2DM, HTN, HLD, SANJIV, morbid obesity BMI 48, CKD 3a and vit D deficiency.    Mac adult weight: 350 lbs. Lowest adult weight: 300 lbs in 2018.    SAGM:    Current meds:  Metformin 1000mg PO BID    Opthamology:   Podiatry:   vaccination:   Dental:  Pancreatitis:     Ace/ARB: losartan  Statin: zocor  Thyroid issues:      Physical Exam:  Body mass index is 48.88 kg/m².  /86 (BP Location: Left arm, Patient Position: Sitting, Cuff Size: Standard)   Pulse 79   Temp 97.8 °F (36.6 °C) (Temporal)   Ht 5' 9\" (1.753 m)   Wt (!) 150 kg (331 lb)   SpO2 98%   BMI 48.88 kg/m²    Vitals:    12/09/24 1043   Weight: (!) 150 kg (331 lb)        Physical Exam  Constitutional:       General: He is not in acute distress.     Appearance: He is well-developed.   HENT:      Head: Normocephalic and atraumatic.      Nose: Nose normal.   Eyes:      Conjunctiva/sclera: Conjunctivae normal.   Pulmonary:      Effort: Pulmonary effort is normal.   Abdominal:      General: There is no distension.   Musculoskeletal:      Cervical back: Normal range of motion and neck supple.   Skin:     Findings: No rash.      Comments: No icterus   Neurological:      Mental Status: He is alert and oriented to person, place, and time.       Labs:   Lab Results   Component Value Date    HGBA1C 11.0 (H) 02/05/2023       No results found for: \"HGO2RKHSPQCR\", \"TSH\", \"A9LSVCN\", \"T5TGECR\", \"THYROIDAB\"    Lab Results   Component Value Date    CREATININE 1.40 (H) 02/06/2023    CREATININE 1.50 (H) 02/05/2023    CREATININE 1.53 (H) 11/05/2022    BUN 25 02/06/2023    K 3.8 02/06/2023     02/06/2023    CO2 24 02/06/2023     eGFR   Date Value Ref Range Status   02/06/2023 58 ml/min/1.73sq m Final       Lab Results   Component Value Date    ALT 43 02/06/2023    AST 34 02/06/2023    ALKPHOS 59 02/06/2023       Lab Results   Component Value Date    CHOLESTEROL 109 02/05/2023 "     Lab Results   Component Value Date    HDL 32 (L) 02/05/2023     Lab Results   Component Value Date    TRIG 324 (H) 02/05/2023     Lab Results   Component Value Date    NONHDLC 77 02/05/2023         Assessment/Plan:    1. Type 2 diabetes mellitus with hyperglycemia, with long-term current use of insulin (HCC)  Assessment & Plan:  He seems to be poorly controlled.    Today we discussed all aspects of diabetes including pathophysiology, risk factors, complications, SAGM, CGM, diet, lifestyle modifications, medical fitness training, diabetes education, goals of therapy, follow up needs and medications including insulin, metformin, Jardiance and GLP1 agonists.  Advised to maintain goal blood sugars 70-180mg/dL.    We agreed to continue metformin and add a basal insulin and GLP1 agonist.  Follow up in 3 months.    Lab Results   Component Value Date    HGBA1C 11.0 (H) 02/05/2023     Orders:  -     Ambulatory Referral to Endocrinology  -     C-peptide; Future  -     Insulin, fasting; Future  -     Hemoglobin A1C; Future  -     Albumin / creatinine urine ratio; Future  -     Insulin-like growth factor 1 (IGF-1); Future  -     Mounjaro 2.5 MG/0.5ML SOAJ; Inject 2.5 mg under the skin once a week  -     insulin glargine (LANTUS) 100 units/mL subcutaneous injection; Inject 20 Units under the skin daily at bedtime  2. SANJIV (obstructive sleep apnea)  3. Stage 3a chronic kidney disease (HCC)  Assessment & Plan:  May consider Farxiga in future.      Lab Results   Component Value Date    EGFR 58 02/06/2023    EGFR 53 02/05/2023    EGFR 52 11/05/2022    CREATININE 1.40 (H) 02/06/2023    CREATININE 1.50 (H) 02/05/2023    CREATININE 1.53 (H) 11/05/2022     4. Mixed hyperlipidemia  5. Morbid obesity (HCC)  Assessment & Plan:  Today we discussed all aspects of obesity and metabolism including pathophysiology, risk factors, complications, goal of sustaining weight loss long term, usual propensity to regain weight with short term  approaches, follow up needs and medications - efficacy and limitations.   Discussed role of endocrinopathies, inflammatory conditions, sleep disorders, mental health disorders, lifestyle issues and polypharmacy and weight gain.  Briefly discussed bariatric surgery.  Diet: carb controlled diet <1500cal/day/ VLCD, 64oz fluids/day   lifestyle modifications: intermittent fasting and 10,000 steps/day  medical fitness training: muscle building  education: nutrition input    Orders:  -     Cortisol Level,7-9 AM Specimen; Future  -     dexamethasone (DECADRON) 1 mg tablet; Take 1 tab at 11pm and get cortisol levels checked between 7:00 a.m. and 9:00 a.m. on the next morning.  -     T4, free; Future  -     TSH, 3rd generation; Future  6. Type 2 diabetes mellitus (HCC)  Assessment & Plan:  He seems to be poorly controlled.    Today we discussed all aspects of diabetes including pathophysiology, risk factors, complications, SAGM, CGM, diet, lifestyle modifications, medical fitness training, diabetes education, goals of therapy, follow up needs and medications including insulin, metformin, Jardiance and GLP1 agonists.  Advised to maintain goal blood sugars 70-180mg/dL.    We agreed to continue metformin and add a basal insulin and GLP1 agonist.  Follow up in 3 months.    Lab Results   Component Value Date    HGBA1C 11.0 (H) 02/05/2023     7. Vitamin D deficiency  -     Vitamin D 25 hydroxy; Future  8. Hypogonadism, male  Assessment & Plan:  Today we discussed all aspects of hypogonadism including normal physiology, pathophysiology, contributing systemic conditions, common symptoms, management principles, complications of treatment, treatment contracts for testosterone replacement and goals of therapy.    Orders:  -     Testosterone, free, total; Future  -     Estradiol; Future  -     Sex Hormone Binding Globulin; Future  -     Luteinizing hormone; Future  -     Follicle stimulating hormone; Future; Expected date:  12/10/2024        I have spent a total time of 45 minutes on 12/09/24 in caring for this patient including greater than 50% of this time was spent in counseling/coordination of care as listed above.       Discussed with the patient and all questioned fully answered. He will contact me with concerns.    Willian Severino

## 2024-12-10 ENCOUNTER — TELEPHONE (OUTPATIENT)
Age: 49
End: 2024-12-10

## 2024-12-10 NOTE — ASSESSMENT & PLAN NOTE
He seems to be poorly controlled.    Today we discussed all aspects of diabetes including pathophysiology, risk factors, complications, SAGM, CGM, diet, lifestyle modifications, medical fitness training, diabetes education, goals of therapy, follow up needs and medications including insulin, metformin, Jardiance and GLP1 agonists.  Advised to maintain goal blood sugars 70-180mg/dL.    We agreed to continue metformin and add a basal insulin and GLP1 agonist.  Follow up in 3 months.    Lab Results   Component Value Date    HGBA1C 11.0 (H) 02/05/2023

## 2024-12-10 NOTE — ASSESSMENT & PLAN NOTE
May consider Farxiga in future.      Lab Results   Component Value Date    EGFR 58 02/06/2023    EGFR 53 02/05/2023    EGFR 52 11/05/2022    CREATININE 1.40 (H) 02/06/2023    CREATININE 1.50 (H) 02/05/2023    CREATININE 1.53 (H) 11/05/2022

## 2024-12-10 NOTE — TELEPHONE ENCOUNTER
PA for Mounjaro 2.5mg SUBMITTED to [employer]    via    []CMM-KEY:   []Surescripts-Case ID #   [x]Availity-Auth ID # [id number] NDC #[id number]  []Faxed to plan   []Other website   []Phone call Case ID #     [x]PA sent as URGENT    All office notes, labs and other pertaining documents and studies sent. Clinical questions answered. Awaiting determination from insurance company.     Turnaround time for your insurance to make a decision on your Prior Authorization can take 7-21 business days.

## 2024-12-10 NOTE — ASSESSMENT & PLAN NOTE
Today we discussed all aspects of hypogonadism including normal physiology, pathophysiology, contributing systemic conditions, common symptoms, management principles, complications of treatment, treatment contracts for testosterone replacement and goals of therapy.

## 2024-12-18 NOTE — TELEPHONE ENCOUNTER
PA for Mounjaro 2.5mg APPROVED     Date(s) approved 10/10/2024-12/09/2025      Patient advised by          []MyChart Message  []Phone call   [x]LMOM  []L/M to call office as no active Communication consent on file  []Unable to leave detailed message as VM not approved on Communication consent       Pharmacy advised by    [x]Fax  []Phone call    Approval letter scanned into Media Yes

## 2024-12-20 ENCOUNTER — TELEPHONE (OUTPATIENT)
Dept: ENDOCRINOLOGY | Facility: CLINIC | Age: 49
End: 2024-12-20

## 2024-12-20 NOTE — TELEPHONE ENCOUNTER
Called patient to follow up on recent visit. Patient was working and unable to talk at this time. Asked for a call back on Monday

## 2024-12-23 DIAGNOSIS — E11.65 TYPE 2 DIABETES MELLITUS WITH HYPERGLYCEMIA, WITH LONG-TERM CURRENT USE OF INSULIN (HCC): ICD-10-CM

## 2024-12-23 DIAGNOSIS — Z79.4 TYPE 2 DIABETES MELLITUS WITH HYPERGLYCEMIA, WITH LONG-TERM CURRENT USE OF INSULIN (HCC): ICD-10-CM

## 2024-12-23 NOTE — TELEPHONE ENCOUNTER
LVM as a courtesy follow up call from recent office visit. Informed patient to call [phone] with any questions or concerns.

## 2024-12-24 RX ORDER — INSULIN GLARGINE-YFGN 100 [IU]/ML
20 INJECTION, SOLUTION SUBCUTANEOUS
Qty: 30 ML | Refills: 0 | Status: SHIPPED | OUTPATIENT
Start: 2024-12-24 | End: 2024-12-27 | Stop reason: SDUPTHER

## 2024-12-27 RX ORDER — INSULIN GLARGINE-YFGN 100 [IU]/ML
20 INJECTION, SOLUTION SUBCUTANEOUS
Qty: 30 ML | Refills: 0 | Status: SHIPPED | OUTPATIENT
Start: 2024-12-27

## 2025-01-06 ENCOUNTER — TELEPHONE (OUTPATIENT)
Dept: ENDOCRINOLOGY | Facility: CLINIC | Age: 50
End: 2025-01-06

## 2025-01-06 DIAGNOSIS — Z79.4 TYPE 2 DIABETES MELLITUS WITH HYPERGLYCEMIA, WITH LONG-TERM CURRENT USE OF INSULIN (HCC): Primary | ICD-10-CM

## 2025-01-06 DIAGNOSIS — E11.65 TYPE 2 DIABETES MELLITUS WITH HYPERGLYCEMIA, WITH LONG-TERM CURRENT USE OF INSULIN (HCC): Primary | ICD-10-CM

## 2025-01-06 NOTE — TELEPHONE ENCOUNTER
Patient called the RX Refill Line. Message is being forwarded to the office.     Patient called requesting script for insulin needles sent to [facility] pharmacy. Inquired about needles script from 2023. Patient states did not want those specific needles. Patient ok with phone call to further discuss if needed.

## 2025-01-07 DIAGNOSIS — E11.65 TYPE 2 DIABETES MELLITUS WITH HYPERGLYCEMIA, WITH LONG-TERM CURRENT USE OF INSULIN (HCC): ICD-10-CM

## 2025-01-07 DIAGNOSIS — Z79.4 TYPE 2 DIABETES MELLITUS WITH HYPERGLYCEMIA, WITH LONG-TERM CURRENT USE OF INSULIN (HCC): ICD-10-CM

## 2025-01-07 RX ORDER — INSULIN GLARGINE 100 [IU]/ML
INJECTION, SOLUTION SUBCUTANEOUS
Qty: 90 ML | Refills: 1 | Status: SHIPPED | OUTPATIENT
Start: 2025-01-07

## 2025-01-08 ENCOUNTER — TELEPHONE (OUTPATIENT)
Dept: ENDOCRINOLOGY | Facility: CLINIC | Age: 50
End: 2025-01-08

## 2025-01-08 DIAGNOSIS — E11.65 TYPE 2 DIABETES MELLITUS WITH HYPERGLYCEMIA, WITH LONG-TERM CURRENT USE OF INSULIN (HCC): ICD-10-CM

## 2025-01-08 DIAGNOSIS — Z79.4 TYPE 2 DIABETES MELLITUS WITH HYPERGLYCEMIA, WITH LONG-TERM CURRENT USE OF INSULIN (HCC): ICD-10-CM

## 2025-01-08 NOTE — TELEPHONE ENCOUNTER
PA for Lantus 100 unit/mL SUBMITTED to [employer]    via    []CMM-KEY:   []Surescripts-Case ID #   [x]Availity-Auth ID # [id number] NDC #[id number]  []Faxed to plan   []Other website   []Phone call Case ID #     [x]PA sent as URGENT    All office notes, labs and other pertaining documents and studies sent. Clinical questions answered. Awaiting determination from insurance company.     Turnaround time for your insurance to make a decision on your Prior Authorization can take 7-21 business days.

## 2025-01-14 ENCOUNTER — OFFICE VISIT (OUTPATIENT)
Dept: UROLOGY | Facility: CLINIC | Age: 50
End: 2025-01-14
Payer: COMMERCIAL

## 2025-01-14 VITALS
OXYGEN SATURATION: 99 % | HEART RATE: 72 BPM | WEIGHT: 315 LBS | HEIGHT: 69 IN | BODY MASS INDEX: 46.65 KG/M2 | SYSTOLIC BLOOD PRESSURE: 140 MMHG | DIASTOLIC BLOOD PRESSURE: 80 MMHG

## 2025-01-14 DIAGNOSIS — Z12.5 SCREENING FOR PROSTATE CANCER: ICD-10-CM

## 2025-01-14 DIAGNOSIS — Z80.42 FAMILY HISTORY OF PROSTATE CANCER IN FATHER: Primary | ICD-10-CM

## 2025-01-14 DIAGNOSIS — N20.0 CALCULUS OF KIDNEY: ICD-10-CM

## 2025-01-14 PROCEDURE — 99204 OFFICE O/P NEW MOD 45 MIN: CPT | Performed by: PHYSICIAN ASSISTANT

## 2025-01-14 RX ORDER — PEN NEEDLE, DIABETIC 32GX 5/32"
NEEDLE, DISPOSABLE MISCELLANEOUS
Qty: 90 EACH | Refills: 1 | Status: SHIPPED | OUTPATIENT
Start: 2025-01-14

## 2025-01-14 NOTE — PROGRESS NOTES
Vel Fuentes is a(n) 49 y.o. male. , :  1975    Subjective     Assessment:  The primary encounter diagnosis was Family history of prostate cancer in father. Diagnoses of Calculus of kidney and Screening for prostate cancer were also pertinent to this visit.    Renal stones  Open right stone surgery ~ 15-20 yrs ago.  Dr. Eastman  (Pt noted had ~10 stones in kidney)   CT ap wo CTS 23 10mm right lower pole stone  Renal stones  on hct 25mg for HTN and allopurinal from PCP     Family history of prostate cancer  Prostate cancer in father    BPH  BPH on DESTINEE.  Asymptomatic   On doxazosin 4mg for HTN    Pancreatic lesion   9mm pancreatic cyst on MRI 23    Plan:  Follow up after renal US, KUB, and PSA prior   F/u with PCP about pancreatic lesion         Dietary Management of Kidney Stone Disease    The dietary recommendations for most people who make kidney stones (especially the most common calcium oxalate stones) are uncomplicated and are not too tedious or bland.  Most importantly, the following recommendations also promote better health for a variety of reasons.    FLUIDS:  The single most important change for the majority patients is the need to greatly increase fluid intake.  You should at least produce two liters (about two quarts) of urine each day.  Depending on the heat outdoors and your level of physical activity, this usually means consuming ten, 10 ounce glasses (100 ounces) of fluid per day.  Water is always a good choice, but other drinks including tea, coffee, soda, and juice are also allowed as long as no one beverage becomes the sole source of fluid.    CALCIUM:  There is excellent evidence that calcium should not be avoided, but instead moderated.  A range of 600 to 1,100 mg of calcium per day, especially consumed at meals is probably a reasonable target. (i.e. 2-3 dairy servings per day) This might include small servings of yogurt, milk or ice cream.  This amount helps avoid  over-absorption of oxalate from the digestive tract and also allows for healthy bone maintenance.    SODIUM (SALT):  Too much salt in your diet (both from the shaker and in the prepared foods that we buy) is bad for your blood pressure, bad for your heart, and also increases the amount of calcium in your urine.  A reasonable sodium restriction to 2,000-2,500mg/day (about the amount in one teaspoon) is an excellent target.  You should get into the habit of reading the “Nutrients” labels on all the foods that you eat and watch out for the foods that have a high sodium content (snack foods, smoked or processed foods, caned foods).  Fresh and frozen foods usually have the least amount of sodium.    PROTEIN:  High protein diets from animal meat (beef, chicken, pork, fish) also increases the rate of kidney stone formation and is equally unhealthy for your heart.  All patients should moderate their meat intake to 3-7 ounces per day, and particularly stay away from red meat protein.    OXALATE:  Most stone-formers should avoid heavy intake of oxalate-rich foods.  These include green roughage (spinach, mustard, kale), strawberries, chocolate, tea, iced tea, and nuts.  In addition, heavy, excess doses of Vitamin C can also produce surges in urinary oxalate levels and should be avoided.    BARE-BONES RECOMMENDATIONS:  Fluids, fluids, fluids.    Low salt diet (your primary care doctor will love you).  Moderate calcium (dairy products), especially with meals.  Moderate red meat intake.         Radiology  02/27/23 CT ap wo CTS  1.  Nonobstructing 10 mm right lower pole renal collecting system calculus.  2.  Mild-moderate circumferential bladder wall thickening which may be due to chronic bladder outlet obstruction and/or edema/inflammation.  3.  Diffuse hepatic steatosis.  4.  Fatty involution of the pancreas with more relatively solid area in the pancreatic tail as described above which may barely reflect an area of lesser  "fatty replacement.  However, solid lesion cannot be entirely excluded and further evaluation with   contrast-enhanced MRI or CT is advised.  5.  Gallstones without evidence for cholecystitis or biliary obstruction.       History  Family history of prostate cancer in father  Renal stones  on hct 25mg for HTN and allopurinal from PCP   BPH on exam.  Asymptomatic-  On doxazosin 4mg for HTN    Past  surgical history   Open right stone surgery ~ 15-20 yrs ago.  Dr. Eastman  (Pt noted had ~10 stones in kidney)         1/14/2025 OV Papi Siddiqi  48 y/o male with prostate cancer in father and hx of open right kidney stone surgery ~ 15-20 years ago.  Presents to establish care.  Notes a good urinary stream, no dysuria, no dysuria, no hematuria.     Review of Systems    No results found for: \"PSA\"  No results found for: \"TESTOSTERONE\"  No components found for: \"CR\"  No results found for: \"HBA1C\"    Objective     /80 (BP Location: Left arm, Patient Position: Sitting, Cuff Size: Adult)   Pulse 72   Ht 5' 9\" (1.753 m)   Wt (!) 147 kg (324 lb)   SpO2 99%   BMI 47.85 kg/m²     Physical Exam  Constitutional:       Appearance: He is obese.   HENT:      Head: Normocephalic.   Pulmonary:      Effort: Pulmonary effort is normal.   Genitourinary:     Comments: Prostate   Grade II  Soft, NT  No nodules   Skin:     General: Skin is warm.   Neurological:      Mental Status: He is alert.   Psychiatric:         Mood and Affect: Mood normal.           Papi Siddiqi, [facility]  "

## 2025-01-17 NOTE — TELEPHONE ENCOUNTER
PA for Lantus 100 unit DENIED    Reason:        Message sent to office clinical pool Yes    Denial letter scanned into Media Yes    Appeal started No (Provider will need to decide if appeal is warranted and send clinical documentation to Prior Authorization Team for initiation.)    **Please follow up with your patient regarding denial and next steps**

## 2025-01-17 NOTE — TELEPHONE ENCOUNTER
Tried contacting pt twice but phone states not in service to make him aware to call his insurance to see which alternatives are covered/he can afford. He should let us know what he decides so we can send to the pharmacy.     [employer] message also sent.

## 2025-01-22 ENCOUNTER — RESULTS FOLLOW-UP (OUTPATIENT)
Dept: UROLOGY | Facility: CLINIC | Age: 50
End: 2025-01-22

## 2025-01-22 LAB — PSA SERPL-MCNC: 0.85 NG/ML

## 2025-01-25 ENCOUNTER — APPOINTMENT (EMERGENCY)
Dept: RADIOLOGY | Facility: HOSPITAL | Age: 50
End: 2025-01-25
Payer: COMMERCIAL

## 2025-01-25 ENCOUNTER — HOSPITAL ENCOUNTER (EMERGENCY)
Facility: HOSPITAL | Age: 50
Discharge: HOME | End: 2025-01-25
Attending: STUDENT IN AN ORGANIZED HEALTH CARE EDUCATION/TRAINING PROGRAM | Admitting: EMERGENCY MEDICINE
Payer: COMMERCIAL

## 2025-01-25 VITALS
TEMPERATURE: 98 F | WEIGHT: 315 LBS | SYSTOLIC BLOOD PRESSURE: 138 MMHG | RESPIRATION RATE: 16 BRPM | DIASTOLIC BLOOD PRESSURE: 72 MMHG | HEIGHT: 69 IN | OXYGEN SATURATION: 98 % | HEART RATE: 87 BPM | BODY MASS INDEX: 46.65 KG/M2

## 2025-01-25 DIAGNOSIS — Z51.89 VISIT FOR WOUND CHECK: Primary | ICD-10-CM

## 2025-01-25 PROCEDURE — 99284 EMERGENCY DEPT VISIT MOD MDM: CPT | Performed by: PHYSICIAN ASSISTANT

## 2025-01-25 PROCEDURE — 73590 X-RAY EXAM OF LOWER LEG: CPT

## 2025-01-25 PROCEDURE — 99283 EMERGENCY DEPT VISIT LOW MDM: CPT

## 2025-01-25 NOTE — DISCHARGE INSTRUCTIONS
Use Tylenol every 4 hours or Motrin every 6 hours; you can alternate the 2 medications taking something every 3 hours for pain.     During the day, wash area gently with warm, soapy water, circular motion, using a washcloth to remove the dead/scabbed skin, then   pat dry keep covered with antibiotic ointment and dressing until healed.  You can leave open at night.    Follow-up with PCP or wound care as needed

## 2025-01-25 NOTE — ED PROVIDER NOTES
Time reflects when diagnosis was documented in both MDM as applicable and the Disposition within this note       Time User Action Codes Description Comment    1/25/2025  6:19 PM Alida Jalloh Add [Z51.89] Visit for wound check           ED Disposition       ED Disposition   Discharge    Condition   Stable    Date/Time   Sat Jan 25, 2025  6:19 PM    Comment   Vel Fuentes discharge to home/self care.                   Assessment & Plan       Medical Decision Making  Scabbed lesion, no signs of infection  Bandaid placed  Stable for dc, outpt fu with pcp, wound care; local basic wound care instructions given to patient    Amount and/or Complexity of Data Reviewed  Radiology: ordered. Decision-making details documented in ED Course.     Details: No fx             Medications - No data to display    ED Risk Strat Scores                          SBIRT 22yo+      Flowsheet Row Most Recent Value   Initial Alcohol Screen: US AUDIT-C     1. How often do you have a drink containing alcohol? 0 Filed at: 01/25/2025 1657   2. How many drinks containing alcohol do you have on a typical day you are drinking?  0 Filed at: 01/25/2025 1657   3a. Male UNDER 65: How often do you have five or more drinks on one occasion? 0 Filed at: 01/25/2025 1657   Audit-C Score 0 Filed at: 01/25/2025 1657   JAXSON: How many times in the past year have you...    Used an illegal drug or used a prescription medication for non-medical reasons? Never Filed at: 01/25/2025 1657                            History of Present Illness       Chief Complaint   Patient presents with    Wound Check     Pt states he was given a medication for a wound on the right ankle 2-3 months ago. He completed the medication but the wound is still there. Pt states that he believes it's slow healing due to DM.        Past Medical History:   Diagnosis Date    Anxiety     Diabetes mellitus (HCC)     High cholesterol     HL (hearing loss)     Hypertension     Sleep apnea,  obstructive     Sleep difficulties       Past Surgical History:   Procedure Laterality Date    INCISION AND DRAINAGE ANTERIOR NECK N/A 8/17/2022    Procedure: INCISION AND DRAINAGE  (I&D) NECK;  Surgeon: Robert Bloch, MD;  Location:  MAIN OR;  Service: General      History reviewed. No pertinent family history.   Social History     Tobacco Use    Smoking status: Some Days     Types: Cigars    Smokeless tobacco: Current     Types: Chew    Tobacco comments:     chewing tobacco occasionally    Vaping Use    Vaping status: Never Used   Substance Use Topics    Alcohol use: Never    Drug use: Never      E-Cigarette/Vaping    E-Cigarette Use Never User       E-Cigarette/Vaping Substances    Nicotine No     THC No     CBD No     Flavoring No     Other No     Unknown No       I have reviewed and agree with the history as documented.     PMH: Morbid obesity, HTN, SANJIV, Hyperlipidemia, Cellulitis and abscess of neck, Type 2 diabetes mellitus with long-term current use of insulin, CKD Hypogonadism, male, Anxiety,   PSH: Incision and drainage anterior neck/abscess    Pt presents to ED for evaluation of wound to mid, lateral RLE.  Pt states he scraped area over 2 months ago, seen by PCP, who put him on oral antibiotics and local wound care.  Pt states still has a scab to area, has been having some throbbing pain to area and difficulty laying on that side.  Also pt had a bad infection on neck and has DM, so just wanted someone to evaluated to make sure he didn't need anything.  No fever, dc, redness, itching          Review of Systems   Constitutional:  Negative for fever.   Skin:  Positive for wound. Negative for color change.   All other systems reviewed and are negative.          Objective       ED Triage Vitals [01/25/25 1657]   Temperature Pulse Blood Pressure Respirations SpO2 Patient Position - Orthostatic VS   98 °F (36.7 °C) 87 138/72 16 98 % Lying      Temp Source Heart Rate Source BP Location FiO2 (%) Pain Score     Oral Monitor Right arm -- 2      Vitals      Date and Time Temp Pulse SpO2 Resp BP Pain Score FACES Pain Rating User   01/25/25 1657 98 °F (36.7 °C) 87 98 % 16 138/72 2 -- FN            Physical Exam  Vitals and nursing note reviewed.   Constitutional:       General: He is not in acute distress.     Appearance: He is well-developed. He is obese.   HENT:      Head: Normocephalic and atraumatic.      Nose: Nose normal.      Mouth/Throat:      Mouth: Mucous membranes are moist.      Pharynx: Oropharynx is clear.   Eyes:      Conjunctiva/sclera: Conjunctivae normal.   Cardiovascular:      Rate and Rhythm: Normal rate.   Pulmonary:      Effort: Pulmonary effort is normal.   Musculoskeletal:         General: Normal range of motion.      Cervical back: Normal range of motion.   Skin:     General: Skin is warm and dry.      Comments: + circular, scabbed lesion with hypertrophic/scarring skin surrounding noted to distal, lateral aspect of RLE (see pic), non tender, no fluctuance, no dc, no warmth.  Area cleaned and bandaid placed   Neurological:      Mental Status: He is alert and oriented to person, place, and time.   Psychiatric:         Behavior: Behavior normal.         Results Reviewed       None            XR tibia fibula 2 views RIGHT    (Results Pending)       Procedures    ED Medication and Procedure Management   Prior to Admission Medications   Prescriptions Last Dose Informant Patient Reported? Taking?   ALPRAZolam (XANAX) 0.5 mg tablet   Yes No   Sig: Take 0.5 mg by mouth every 8 (eight) hours as needed   Aspirin Buf,CaCarb-MgCarb-MgO, 81 MG TABS   Yes No   Sig: Take 1 tablet by mouth daily   BD Pen Needle Cece 2nd Gen 32G X 4 MM MISC   No No   Sig: Use daily with Lantus   Cholecalciferol (Vitamin D) 50 MCG (2000 UT) CAPS   Yes No   Sig: Take 1 tablet by mouth daily   Lantus 100 UNIT/ML subcutaneous injection   No No   Sig: INJECT 0.2 ML (20 UNITS TOTAL) UNDER THE SKIN DAILY AT BEDTIME   Patient not taking:  Reported on 2025   Multiple Vitamin (MULTIVITAMIN ADULT PO)   Yes No   Sig: Take by mouth   True Metrix Blood Glucose Test test strip   Yes No   Si (two) times a day Test as directed   Patient not taking: Reported on 10/7/2024   acetaminophen (TYLENOL) 500 mg tablet   Yes No   Sig: Take 500 mg by mouth every 6 (six) hours as needed for mild pain   allopurinol (ZYLOPRIM) 100 mg tablet   Yes No   amLODIPine (NORVASC) 5 mg tablet   Yes No   dexamethasone (DECADRON) 1 mg tablet   No No   Sig: Take 1 tab at 11pm and get cortisol levels checked between 7:00 a.m. and 9:00 a.m. on the next morning.   doxazosin (CARDURA) 4 mg tablet   Yes No   Sig: Take 4 mg by mouth 2 (two) times a day   hydrALAZINE (APRESOLINE) 50 mg tablet   Yes No   Si (two) times a day   hydrochlorothiazide (HYDRODIURIL) 25 mg tablet   Yes No   Sig: Take 25 mg by mouth daily   ibuprofen (MOTRIN) 200 mg tablet   Yes No   Sig: Take 200 mg by mouth every 6 (six) hours as needed for mild pain   losartan (COZAAR) 100 MG tablet   Yes No   metFORMIN (GLUCOPHAGE) 1000 MG tablet   Yes No   Si (two) times a day with meals   metoprolol tartrate (LOPRESSOR) 50 mg tablet   Yes No   Sig: every 12 (twelve) hours   simvastatin (ZOCOR) 10 mg tablet   Yes No   spironolactone (ALDACTONE) 25 mg tablet   Yes No      Facility-Administered Medications: None     Discharge Medication List as of 2025  6:25 PM        CONTINUE these medications which have NOT CHANGED    Details   acetaminophen (TYLENOL) 500 mg tablet Take 500 mg by mouth every 6 (six) hours as needed for mild pain, Historical Med      allopurinol (ZYLOPRIM) 100 mg tablet Starting Mon 10/18/2021, Historical Med      ALPRAZolam (XANAX) 0.5 mg tablet Take 0.5 mg by mouth every 8 (eight) hours as needed, Starting 2021, Historical Med      amLODIPine (NORVASC) 5 mg tablet Starting Mon 10/18/2021, Historical Med      Aspirin Buf,CaCarb-MgCarb-MgO, 81 MG TABS Take 1 tablet by mouth daily,  Historical Med      BD Pen Needle Cece 2nd Gen 32G X 4 MM MISC Use daily with Lantus, Normal      Cholecalciferol (Vitamin D) 50 MCG (2000 UT) CAPS Take 1 tablet by mouth daily, Historical Med      dexamethasone (DECADRON) 1 mg tablet Take 1 tab at 11pm and get cortisol levels checked between 7:00 a.m. and 9:00 a.m. on the next morning., Normal      doxazosin (CARDURA) 4 mg tablet Take 4 mg by mouth 2 (two) times a day, Starting Sat 9/25/2021, Historical Med      hydrALAZINE (APRESOLINE) 50 mg tablet 2 (two) times a day, Starting Mon 10/18/2021, Historical Med      hydrochlorothiazide (HYDRODIURIL) 25 mg tablet Take 25 mg by mouth daily, Historical Med      ibuprofen (MOTRIN) 200 mg tablet Take 200 mg by mouth every 6 (six) hours as needed for mild pain, Historical Med      Lantus 100 UNIT/ML subcutaneous injection INJECT 0.2 ML (20 UNITS TOTAL) UNDER THE SKIN DAILY AT BEDTIME, Normal      losartan (COZAAR) 100 MG tablet Starting Mon 10/18/2021, Historical Med      metFORMIN (GLUCOPHAGE) 1000 MG tablet 2 (two) times a day with meals, Starting Mon 10/18/2021, Historical Med      metoprolol tartrate (LOPRESSOR) 50 mg tablet every 12 (twelve) hours, Starting Mon 10/18/2021, Historical Med      Multiple Vitamin (MULTIVITAMIN ADULT PO) Take by mouth, Historical Med      simvastatin (ZOCOR) 10 mg tablet Starting Mon 10/18/2021, Historical Med      spironolactone (ALDACTONE) 25 mg tablet Starting Mon 10/18/2021, Historical Med      True Metrix Blood Glucose Test test strip 2 (two) times a day Test as directed, Starting Tue 4/11/2023, Historical Med           No discharge procedures on file.  ED SEPSIS DOCUMENTATION   Time reflects when diagnosis was documented in both MDM as applicable and the Disposition within this note       Time User Action Codes Description Comment    1/25/2025  6:19 PM Alida Jalloh [Z51.89] Visit for wound check                  Alida Jalloh PA-C  01/25/25 2008

## 2025-01-28 ENCOUNTER — HOSPITAL ENCOUNTER (OUTPATIENT)
Dept: ULTRASOUND IMAGING | Facility: HOSPITAL | Age: 50
Discharge: HOME | End: 2025-01-28
Payer: COMMERCIAL

## 2025-01-28 ENCOUNTER — HOSPITAL ENCOUNTER (OUTPATIENT)
Dept: RADIOLOGY | Facility: HOSPITAL | Age: 50
Discharge: HOME | End: 2025-01-28
Payer: COMMERCIAL

## 2025-01-28 DIAGNOSIS — N20.0 CALCULUS OF KIDNEY: ICD-10-CM

## 2025-01-28 PROCEDURE — 74018 RADEX ABDOMEN 1 VIEW: CPT

## 2025-01-28 PROCEDURE — 76775 US EXAM ABDO BACK WALL LIM: CPT

## 2025-02-10 ENCOUNTER — HOSPITAL ENCOUNTER (OUTPATIENT)
Dept: MRI IMAGING | Facility: HOSPITAL | Age: 50
Discharge: HOME | End: 2025-02-10
Attending: INTERNAL MEDICINE
Payer: COMMERCIAL

## 2025-02-10 DIAGNOSIS — K86.2 CYST OF PANCREAS: ICD-10-CM

## 2025-02-10 PROCEDURE — 74183 MRI ABD W/O CNTR FLWD CNTR: CPT

## 2025-02-10 PROCEDURE — A9585 GADOBUTROL INJECTION: HCPCS | Performed by: INTERNAL MEDICINE

## 2025-02-10 RX ORDER — GADOBUTROL 604.72 MG/ML
14 INJECTION INTRAVENOUS
Status: COMPLETED | OUTPATIENT
Start: 2025-02-10 | End: 2025-02-10

## 2025-02-10 RX ADMIN — GADOBUTROL 14 ML: 604.72 INJECTION INTRAVENOUS at 10:45

## 2025-02-13 NOTE — ASSESSMENT & PLAN NOTE
Subjective     REVIEW OF SYSTEMS:  Review of Systems   Constitutional:  Negative for chills, diaphoresis, fatigue and fever.   HENT:  Negative for congestion, ear pain, hearing loss, rhinorrhea, sinus pressure, sinus pain, sneezing and sore throat.    Eyes:  Negative for photophobia and visual disturbance.   Respiratory:  Negative for cough, shortness of breath and wheezing.    Cardiovascular:  Negative for chest pain, palpitations and leg swelling.   Gastrointestinal:  Negative for abdominal distention and abdominal pain.   Endocrine: Negative for polyuria.   Genitourinary:  Negative for difficulty urinating, frequency and urgency.   Musculoskeletal:  Negative for arthralgias, gait problem and neck pain.   Skin:  Negative for color change and rash.   Neurological:  Negative for dizziness, seizures, weakness, light-headedness, numbness and headaches.   Psychiatric/Behavioral:  Negative for agitation and behavioral problems.        I/O's    Intake/Output Summary (Last 24 hours) at 2/13/2025 1333  Last data filed at 2/13/2025 0900  Gross per 24 hour   Intake 690 ml   Output 0 ml   Net 690 ml       Last Recorded Vitals  Blood pressure 111/77, pulse 83, temperature 97.7 °F (36.5 °C), temperature source Oral, resp. rate 16, height 5' 5\" (1.651 m), weight 81.6 kg (179 lb 14.3 oz), SpO2 99%.  Body mass index is 29.94 kg/m².      Physical Exam  Vitals and nursing note reviewed.   Constitutional:       General: He is not in acute distress.     Appearance: He is well-developed. He is not diaphoretic.   HENT:      Head: Normocephalic and atraumatic.      Nose: Nose normal.      Mouth/Throat:      Pharynx: No oropharyngeal exudate.   Eyes:      General: No scleral icterus.        Left eye: No discharge.      Conjunctiva/sclera: Conjunctivae normal.      Pupils: Pupils are equal, round, and reactive to light.   Neck:      Thyroid: No thyromegaly.      Vascular: No JVD.      Trachea: No tracheal deviation.   Cardiovascular:       · BP elevated on admission 171/84  · Continue PTA antihypertensives: doxazosin, hydralazine, Norvasc, HCTZ, losartan, spironolactone, lopressor Rate and Rhythm: Normal rate and regular rhythm.      Heart sounds: Normal heart sounds. No murmur heard.     No friction rub. No gallop.   Pulmonary:      Effort: Pulmonary effort is normal. No respiratory distress.      Breath sounds: Normal breath sounds. No wheezing or rales.   Chest:      Chest wall: No tenderness.   Abdominal:      General: Bowel sounds are normal. There is no distension.      Palpations: Abdomen is soft. There is no mass.      Tenderness: There is no abdominal tenderness. There is no guarding or rebound.   Musculoskeletal:         General: No tenderness or deformity. Normal range of motion.      Cervical back: Normal range of motion and neck supple.   Lymphadenopathy:      Cervical: No cervical adenopathy.   Skin:     General: Skin is warm and dry.      Coloration: Skin is not pale.      Findings: No erythema or rash.   Neurological:      Mental Status: He is alert and oriented to person, place, and time.      Cranial Nerves: No cranial nerve deficit.      Coordination: Coordination normal.      Deep Tendon Reflexes: Reflexes are normal and symmetric. Reflexes normal.           Labs     Recent Labs   Lab 02/13/25  1036 02/12/25  0945 02/09/25  0659   SODIUM 136 135 141   POTASSIUM 3.4 3.5 3.7   CHLORIDE 106 105 114*   CO2 26 23 23   BUN 6 6 5*   CREATININE 0.72 0.82 0.75   GLUCOSE 138* 118* 98   CALCIUM 8.9 9.8 8.5      Recent Labs   Lab 02/13/25  1036 02/12/25  0945 02/09/25  0659   SODIUM 136 135 141   CHLORIDE 106 105 114*   CO2 26 23 23   BUN 6 6 5*   CREATININE 0.72 0.82 0.75   CALCIUM 8.9 9.8 8.5   ALBUMIN 3.1* 3.7 3.1*   BILIRUBIN 1.0 1.2* 0.9   ALKPT 70 75 58   GPT 71* 71* 79*   AST 27 27 21   GLUCOSE 138* 118* 98      Recent Labs   Lab 02/13/25  0613   WBC 7.4   RBC 4.41*   HGB 13.7   HCT 39.0           Imaging      XR ABDOMEN 1 VIEW   Final Result by Bakari Watson MD (02/10 0837)   FINDINGS AND IMPRESSION:    Frontal view of the abdomen was obtained.       Previously described  borderline dilated small bowel in the central abdomen   has resolved. No evidence of bowel obstruction or ileus. No dilated bowel   loops identified. Nonspecific bowel gas pattern. No evidence of free   intraperitoneal air. No evidence of pneumatosis intestinalis. Lung bases   are clear.         Electronically Signed by: CRUZ MADRID M.D.    Signed on: 2/10/2025 8:37 AM    Workstation ID: [id number]      XR ABDOMEN 1 VIEW   Final Result by Mesfin Telles MD (02/09 2302)   Borderline dilated small bowel in the central abdomen can   represent developing small bowel obstruction or ileus.      Electronically Signed by: MESFIN TELLES MD    Signed on: 2/9/2025 11:02 PM    Workstation ID: [id number]      CT ABDOMEN PELVIS W CONTRAST - IV contrast only   Final Result by Josue Nelson MD (02/08 3001)   1. No findings of acute intra-abdominal inflammatory process. No free air   or free fluid.   2. Hepatic steatosis.      Electronically Signed by: JOSUE NELSON M.D.    Signed on: 2/8/2025 9:51 PM    Workstation ID: [id number]             ASSESSMENT/ PLAN  Intractable nausea vomiting-improved  Patient was scheduled with GI patient may be discharged after if he tolerates food have explained to the patient as well.  Based upon evaluation from psychiatry patient will need to follow-up with psychology/psychiatrist outpatient    Smoking Cessation  Counseling given: Not Answered            Chapo Mota DO

## 2025-02-14 ENCOUNTER — APPOINTMENT (EMERGENCY)
Dept: RADIOLOGY | Facility: HOSPITAL | Age: 50
End: 2025-02-14
Payer: COMMERCIAL

## 2025-02-14 ENCOUNTER — HOSPITAL ENCOUNTER (EMERGENCY)
Facility: HOSPITAL | Age: 50
Discharge: HOME | End: 2025-02-14
Attending: EMERGENCY MEDICINE
Payer: COMMERCIAL

## 2025-02-14 VITALS
SYSTOLIC BLOOD PRESSURE: 119 MMHG | HEART RATE: 72 BPM | OXYGEN SATURATION: 99 % | RESPIRATION RATE: 20 BRPM | TEMPERATURE: 98.1 F | DIASTOLIC BLOOD PRESSURE: 69 MMHG

## 2025-02-14 DIAGNOSIS — K21.9 GERD (GASTROESOPHAGEAL REFLUX DISEASE): Primary | ICD-10-CM

## 2025-02-14 LAB
2HR DELTA HS TROPONIN: -2 NG/L
ALBUMIN SERPL BCG-MCNC: 4.6 G/DL (ref 3.5–5)
ALP SERPL-CCNC: 56 U/L (ref 34–104)
ALT SERPL W P-5'-P-CCNC: 34 U/L (ref 7–52)
ANION GAP SERPL CALCULATED.3IONS-SCNC: 11 MMOL/L (ref 4–13)
AST SERPL W P-5'-P-CCNC: 26 U/L (ref 13–39)
ATRIAL RATE: 76 BPM
BASOPHILS # BLD AUTO: 0.04 THOUSANDS/ÂΜL (ref 0–0.1)
BASOPHILS NFR BLD AUTO: 1 % (ref 0–1)
BILIRUB SERPL-MCNC: 0.37 MG/DL (ref 0.2–1)
BUN SERPL-MCNC: 37 MG/DL (ref 5–25)
CALCIUM SERPL-MCNC: 9.6 MG/DL (ref 8.4–10.2)
CARDIAC TROPONIN I PNL SERPL HS: 11 NG/L (ref ?–50)
CARDIAC TROPONIN I PNL SERPL HS: 13 NG/L (ref ?–50)
CHLORIDE SERPL-SCNC: 103 MMOL/L (ref 96–108)
CO2 SERPL-SCNC: 19 MMOL/L (ref 21–32)
CREAT SERPL-MCNC: 1.6 MG/DL (ref 0.6–1.3)
EOSINOPHIL # BLD AUTO: 0.1 THOUSAND/ÂΜL (ref 0–0.61)
EOSINOPHIL NFR BLD AUTO: 2 % (ref 0–6)
ERYTHROCYTE [DISTWIDTH] IN BLOOD BY AUTOMATED COUNT: 13 % (ref 11.6–15.1)
GFR SERPL CREATININE-BSD FRML MDRD: 49 ML/MIN/1.73SQ M
GLUCOSE SERPL-MCNC: 190 MG/DL (ref 65–140)
HCT VFR BLD AUTO: 37.4 % (ref 36.5–49.3)
HGB BLD-MCNC: 13.2 G/DL (ref 12–17)
IMM GRANULOCYTES # BLD AUTO: 0.03 THOUSAND/UL (ref 0–0.2)
IMM GRANULOCYTES NFR BLD AUTO: 1 % (ref 0–2)
LIPASE SERPL-CCNC: 82 U/L (ref 11–82)
LYMPHOCYTES # BLD AUTO: 1.23 THOUSANDS/ÂΜL (ref 0.6–4.47)
LYMPHOCYTES NFR BLD AUTO: 19 % (ref 14–44)
MCH RBC QN AUTO: 30.6 PG (ref 26.8–34.3)
MCHC RBC AUTO-ENTMCNC: 35.3 G/DL (ref 31.4–37.4)
MCV RBC AUTO: 87 FL (ref 82–98)
MONOCYTES # BLD AUTO: 0.49 THOUSAND/ÂΜL (ref 0.17–1.22)
MONOCYTES NFR BLD AUTO: 8 % (ref 4–12)
NEUTROPHILS # BLD AUTO: 4.67 THOUSANDS/ÂΜL (ref 1.85–7.62)
NEUTS SEG NFR BLD AUTO: 69 % (ref 43–75)
NRBC BLD AUTO-RTO: 0 /100 WBCS
P AXIS: 38 DEGREES
PLATELET # BLD AUTO: 207 THOUSANDS/UL (ref 149–390)
PMV BLD AUTO: 9 FL (ref 8.9–12.7)
POTASSIUM SERPL-SCNC: 4.6 MMOL/L (ref 3.5–5.3)
PR INTERVAL: 168 MS
PROT SERPL-MCNC: 7.3 G/DL (ref 6.4–8.4)
QRS AXIS: -64 DEGREES
QRSD INTERVAL: 138 MS
QT INTERVAL: 370 MS
QTC INTERVAL: 416 MS
RBC # BLD AUTO: 4.31 MILLION/UL (ref 3.88–5.62)
SODIUM SERPL-SCNC: 133 MMOL/L (ref 135–147)
T WAVE AXIS: 15 DEGREES
TSH SERPL DL<=0.05 MIU/L-ACNC: 1.14 UIU/ML (ref 0.45–4.5)
VENTRICULAR RATE: 76 BPM
WBC # BLD AUTO: 6.56 THOUSAND/UL (ref 4.31–10.16)

## 2025-02-14 PROCEDURE — 96360 HYDRATION IV INFUSION INIT: CPT

## 2025-02-14 PROCEDURE — 85025 COMPLETE CBC W/AUTO DIFF WBC: CPT | Performed by: PHYSICIAN ASSISTANT

## 2025-02-14 PROCEDURE — 36415 COLL VENOUS BLD VENIPUNCTURE: CPT | Performed by: PHYSICIAN ASSISTANT

## 2025-02-14 PROCEDURE — 96361 HYDRATE IV INFUSION ADD-ON: CPT

## 2025-02-14 PROCEDURE — 83690 ASSAY OF LIPASE: CPT | Performed by: PHYSICIAN ASSISTANT

## 2025-02-14 PROCEDURE — 99284 EMERGENCY DEPT VISIT MOD MDM: CPT

## 2025-02-14 PROCEDURE — 93005 ELECTROCARDIOGRAM TRACING: CPT

## 2025-02-14 PROCEDURE — 99285 EMERGENCY DEPT VISIT HI MDM: CPT | Performed by: PHYSICIAN ASSISTANT

## 2025-02-14 PROCEDURE — 84443 ASSAY THYROID STIM HORMONE: CPT | Performed by: PHYSICIAN ASSISTANT

## 2025-02-14 PROCEDURE — 84484 ASSAY OF TROPONIN QUANT: CPT | Performed by: PHYSICIAN ASSISTANT

## 2025-02-14 PROCEDURE — 80053 COMPREHEN METABOLIC PANEL: CPT | Performed by: PHYSICIAN ASSISTANT

## 2025-02-14 PROCEDURE — 93010 ELECTROCARDIOGRAM REPORT: CPT | Performed by: INTERNAL MEDICINE

## 2025-02-14 PROCEDURE — 71046 X-RAY EXAM CHEST 2 VIEWS: CPT

## 2025-02-14 RX ORDER — LIDOCAINE HYDROCHLORIDE 20 MG/ML
15 SOLUTION OROPHARYNGEAL ONCE
Status: COMPLETED | OUTPATIENT
Start: 2025-02-14 | End: 2025-02-14

## 2025-02-14 RX ORDER — FAMOTIDINE 20 MG/1
20 TABLET, FILM COATED ORAL 2 TIMES DAILY
Qty: 30 TABLET | Refills: 0 | Status: SHIPPED | OUTPATIENT
Start: 2025-02-14

## 2025-02-14 RX ORDER — MAGNESIUM HYDROXIDE/ALUMINUM HYDROXICE/SIMETHICONE 120; 1200; 1200 MG/30ML; MG/30ML; MG/30ML
30 SUSPENSION ORAL ONCE
Status: COMPLETED | OUTPATIENT
Start: 2025-02-14 | End: 2025-02-14

## 2025-02-14 RX ORDER — SUCRALFATE 1 G/1
1 TABLET ORAL 4 TIMES DAILY
Qty: 20 TABLET | Refills: 0 | Status: SHIPPED | OUTPATIENT
Start: 2025-02-14

## 2025-02-14 RX ADMIN — LIDOCAINE HYDROCHLORIDE 15 ML: 20 SOLUTION ORAL at 13:00

## 2025-02-14 RX ADMIN — SODIUM CHLORIDE 1000 ML: 0.9 INJECTION, SOLUTION INTRAVENOUS at 13:00

## 2025-02-14 RX ADMIN — ALUMINUM HYDROXIDE, MAGNESIUM HYDROXIDE, AND DIMETHICONE 30 ML: 200; 20; 200 SUSPENSION ORAL at 13:00

## 2025-02-14 NOTE — DISCHARGE INSTRUCTIONS
Please return to the emergency department for worsening symptoms including chest pain, shortness of breath, dizziness, lightheadedness, fever greater than 103, severe pain, inability to walk, fainting episodes, etc..  Please follow-up with your family practice provider as soon as possible.  I have sent medications over to the pharmacy for your symptoms.  Please take as directed.  Avoid spicy foods, caffeine, and chocolate.  Also avoid NSAIDs.  This could trigger your acid reflux.  Follow-up with a gastroenterologist.  I have given you a referral.

## 2025-02-15 NOTE — ED PROVIDER NOTES
Time reflects when diagnosis was documented in both MDM as applicable and the Disposition within this note       Time User Action Codes Description Comment    2/14/2025  3:25 PM Jonathan Vogt Add [K21.9] GERD (gastroesophageal reflux disease)           ED Disposition       ED Disposition   Discharge    Condition   Stable    Date/Time   Fri Feb 14, 2025  3:25 PM    Comment   Vel DARLING Mckeejennifermaldonado discharge to home/self care.                   Assessment & Plan       Medical Decision Making  49-year-old male presenting to the emergency department today for left upper quadrant pain and a burning sensation in his chest since this morning.  Vitals are stable.  Afebrile.  No shortness of breath.  On physical examination, the patient has a benign abdominal examination.  Lungs clear to auscultation bilaterally.  EKG shows normal sinus rhythm with a rate of 76.  I reviewed prior EKG.  Chest x-ray is without acute cardiopulmonary disease on my independent interpretation.  Patient was given intravenous fluids in addition to a GI cocktail and notes that symptoms completely resolved.  He had a negative troponin x 2.  He has a chronic elevation of creatinine.  Today is 1.6.  Negative lipase.  CBC is reassuring.  Heart score is 5.  Will refer to cardiology.  Stable for discharge at this time.  Pepcid and Carafate sent to the patient's pharmacy.  Avoid triggering foods.  Follow-up with gastroenterology.  Referral placed.    Amount and/or Complexity of Data Reviewed  Labs: ordered.  Radiology: ordered.    Risk  OTC drugs.  Prescription drug management.             Medications   sodium chloride 0.9 % bolus 1,000 mL (0 mL Intravenous Stopped 2/14/25 1431)   Lidocaine Viscous HCl (XYLOCAINE) 2 % mucosal solution 15 mL (15 mL Swish & Swallow Given 2/14/25 1300)   aluminum-magnesium hydroxide-simethicone (MAALOX) oral suspension 30 mL (30 mL Oral Given 2/14/25 1300)       ED Risk Strat Scores   HEART Risk Score      Flowsheet Row  "Most Recent Value   Heart Score Risk Calculator    History 0 Filed at: 02/15/2025 1020   ECG 1 Filed at: 02/15/2025 1020   Age 1 Filed at: 02/15/2025 1020   Risk Factors 2 Filed at: 02/15/2025 1020   Troponin 1 Filed at: 02/15/2025 1020   HEART Score 5 Filed at: 02/15/2025 1020          HEART Risk Score      Flowsheet Row Most Recent Value   Heart Score Risk Calculator    History 0 Filed at: 02/15/2025 1020   ECG 1 Filed at: 02/15/2025 1020   Age 1 Filed at: 02/15/2025 1020   Risk Factors 2 Filed at: 02/15/2025 1020   Troponin 1 Filed at: 02/15/2025 1020   HEART Score 5 Filed at: 02/15/2025 1020                              SBIRT 20yo+      Flowsheet Row Most Recent Value   Initial Alcohol Screen: US AUDIT-C     1. How often do you have a drink containing alcohol? 0 Filed at: 02/14/2025 1228   2. How many drinks containing alcohol do you have on a typical day you are drinking?  0 Filed at: 02/14/2025 1228   3a. Male UNDER 65: How often do you have five or more drinks on one occasion? 0 Filed at: 02/14/2025 1228   Audit-C Score 0 Filed at: 02/14/2025 1228   JAXSON: How many times in the past year have you...    Used an illegal drug or used a prescription medication for non-medical reasons? Never Filed at: 02/14/2025 1228                            History of Present Illness       Chief Complaint   Patient presents with    Shortness of Breath     Pt presents to ed for sob and palpitations that started this morning, states he had a panic attack 2 days ago, is consistent with his anxiety meds. Reports having similar episodes a few years ago and being told it was heart burn. Pt also states he's had chills for the last week but thinks it was due to a \"sauce\" no other complaints       Past Medical History:   Diagnosis Date    Anxiety     Diabetes mellitus (HCC)     High cholesterol     HL (hearing loss)     Hypertension     Sleep apnea, obstructive     Sleep difficulties       Past Surgical History:   Procedure Laterality " Date    INCISION AND DRAINAGE ANTERIOR NECK N/A 8/17/2022    Procedure: INCISION AND DRAINAGE  (I&D) NECK;  Surgeon: Robert Bloch, MD;  Location: [facility];  Service: General      History reviewed. No pertinent family history.   Social History     Tobacco Use    Smoking status: Some Days     Types: Cigars    Smokeless tobacco: Current     Types: Chew    Tobacco comments:     chewing tobacco occasionally    Vaping Use    Vaping status: Never Used   Substance Use Topics    Alcohol use: Never    Drug use: Never      E-Cigarette/Vaping    E-Cigarette Use Never User       E-Cigarette/Vaping Substances    Nicotine No     THC No     CBD No     Flavoring No     Other No     Unknown No       I have reviewed and agree with the history as documented.     This is a 49-year-old male with past medical history significant for type 2 diabetes mellitus, high cholesterol, and hypertension presenting to the emergency department today for left-sided chest pain.  The patient notes it is a burning sensation that radiates up from his left lower quadrant.  The pain began this morning.  It has been intermittent.  It does not radiate to his back.  The patient ate Ha's yesterday and questions if that might be the cause.  He denies any associated shortness of breath or palpitations.  The patient denies any dizziness, lightheadedness, or visual disturbances.  He denies any lower extremity swelling or calf pain.  He denies any nausea or vomiting.  He has no diarrhea or constipation.  He denies any flank pain.  No fevers or chills.  No other complaints at this time.      History provided by:  Patient   used: No    Heartburn  Location:  LUQ Pain; Burning Sensation  Severity:  Moderate  Onset quality:  Gradual  Duration: since this AM.  Timing:  Constant  Progression:  Waxing and waning  Chronicity:  New  Associated symptoms: chest pain    Associated symptoms: no abdominal pain, no congestion, no cough, no diarrhea, no  ear pain, no fatigue, no fever, no headaches, no loss of consciousness, no myalgias, no nausea, no rash, no rhinorrhea, no shortness of breath, no sore throat, no vomiting and no wheezing        Review of Systems   Constitutional:  Negative for appetite change, chills, diaphoresis, fatigue and fever.   HENT:  Negative for congestion, ear pain, rhinorrhea and sore throat.    Respiratory:  Positive for chest tightness. Negative for cough, shortness of breath and wheezing.    Cardiovascular:  Positive for chest pain. Negative for palpitations and leg swelling.   Gastrointestinal:  Negative for abdominal pain, constipation, diarrhea, nausea and vomiting.   Musculoskeletal:  Negative for myalgias, neck pain and neck stiffness.   Skin:  Negative for rash and wound.   Neurological:  Negative for dizziness, seizures, loss of consciousness, syncope, weakness, light-headedness, numbness and headaches.   Psychiatric/Behavioral:  Negative for confusion.    All other systems reviewed and are negative.          Objective       ED Triage Vitals   Temperature Pulse Blood Pressure Respirations SpO2 Patient Position - Orthostatic VS   02/14/25 1221 02/14/25 1221 02/14/25 1227 02/14/25 1221 02/14/25 1221 02/14/25 1430   98.1 °F (36.7 °C) 89 133/72 17 98 % Lying      Temp src Heart Rate Source BP Location FiO2 (%) Pain Score    -- 02/14/25 1221 02/14/25 1221 -- 02/14/25 1221     Monitor Right arm  No Pain      Vitals      Date and Time Temp Pulse SpO2 Resp BP Pain Score FACES Pain Rating User   02/14/25 1520 -- 72 99 % 20 119/69 -- -- BRIE   02/14/25 1430 -- 81 97 % 18 130/70 -- -- DG   02/14/25 1230 -- 75 95 % 20 133/72 -- -- DG   02/14/25 1227 -- -- -- -- 133/72 -- -- BRIE   02/14/25 1221 98.1 °F (36.7 °C) 89 98 % 17 -- No Pain -- JV            Physical Exam  Vitals and nursing note reviewed.   Constitutional:       General: He is not in acute distress.     Appearance: Normal appearance. He is obese. He is not ill-appearing,  toxic-appearing or diaphoretic.   HENT:      Head: Normocephalic and atraumatic.      Nose: Nose normal. No congestion or rhinorrhea.      Mouth/Throat:      Mouth: Mucous membranes are moist.      Pharynx: No oropharyngeal exudate or posterior oropharyngeal erythema.   Eyes:      General: No scleral icterus.        Right eye: No discharge.         Left eye: No discharge.      Conjunctiva/sclera: Conjunctivae normal.   Cardiovascular:      Rate and Rhythm: Normal rate and regular rhythm.      Pulses: Normal pulses.      Heart sounds: Normal heart sounds. No murmur heard.     No friction rub. No gallop.   Pulmonary:      Effort: Pulmonary effort is normal. No respiratory distress.      Breath sounds: Normal breath sounds. No stridor. No wheezing, rhonchi or rales.   Chest:      Chest wall: No tenderness.   Abdominal:      General: Abdomen is flat. There is no distension.      Palpations: Abdomen is soft.      Tenderness: There is no abdominal tenderness. There is no right CVA tenderness, left CVA tenderness, guarding or rebound.      Comments: The abdomen is soft, nontender, nondistended, and without organomegaly.  No rebound, Rovsing, or McBurney's point tenderness.  Negative Moore sign.  Nonperitoneal.   Musculoskeletal:         General: Normal range of motion.      Cervical back: Normal range of motion. No rigidity.      Right lower leg: No edema.      Left lower leg: No edema.      Comments: Negative Homans' sign bilaterally   Skin:     General: Skin is warm and dry.      Capillary Refill: Capillary refill takes less than 2 seconds.      Coloration: Skin is not jaundiced or pale.   Neurological:      General: No focal deficit present.      Mental Status: He is alert and oriented to person, place, and time. Mental status is at baseline.   Psychiatric:         Mood and Affect: Mood normal.         Behavior: Behavior normal.         Results Reviewed       Procedure Component Value Units Date/Time    HS Troponin I  2hr [[id number]]  (Normal) Collected: 02/14/25 1448    Lab Status: Final result Specimen: Blood from Arm, Left Updated: 02/14/25 1517     hs TnI 2hr 11 ng/L      Delta 2hr hsTnI -2 ng/L     TSH, 3rd generation with Free T4 reflex [[id number]]  (Normal) Collected: 02/14/25 1258    Lab Status: Final result Specimen: Blood from Arm, Right Updated: 02/14/25 1340     TSH 3RD GENERATON 1.140 uIU/mL     HS Troponin 0hr (reflex protocol) [[id number]]  (Normal) Collected: 02/14/25 1258    Lab Status: Final result Specimen: Blood from Arm, Right Updated: 02/14/25 1332     hs TnI 0hr 13 ng/L     Comprehensive metabolic panel [[id number]]  (Abnormal) Collected: 02/14/25 1258    Lab Status: Final result Specimen: Blood from Arm, Right Updated: 02/14/25 1325     Sodium 133 mmol/L      Potassium 4.6 mmol/L      Chloride 103 mmol/L      CO2 19 mmol/L      ANION GAP 11 mmol/L      BUN 37 mg/dL      Creatinine 1.60 mg/dL      Glucose 190 mg/dL      Calcium 9.6 mg/dL      AST 26 U/L      ALT 34 U/L      Alkaline Phosphatase 56 U/L      Total Protein 7.3 g/dL      Albumin 4.6 g/dL      Total Bilirubin 0.37 mg/dL      eGFR 49 ml/min/1.73sq m     Narrative:      National Kid[employer] Foundation guidelines for Chronic Kidney Disease (CKD):     Stage 1 with normal or high GFR (GFR > 90 mL/min/1.73 square meters)    Stage 2 Mild CKD (GFR = 60-89 mL/min/1.73 square meters)    Stage 3A Moderate CKD (GFR = 45-59 mL/min/1.73 square meters)    Stage 3B Moderate CKD (GFR = 30-44 mL/min/1.73 square meters)    Stage 4 Severe CKD (GFR = 15-29 mL/min/1.73 square meters)    Stage 5 End Stage CKD (GFR <15 mL/min/1.73 square meters)  Note: GFR calculation is accurate only with a steady state creatinine    Lipase [[id number]]  (Normal) Collected: 02/14/25 1258    Lab Status: Final result Specimen: Blood from Arm, Right Updated: 02/14/25 1325     Lipase 82 u/L     CBC and differential [[id number]] Collected: 02/14/25 1258    Lab Status: Final result Specimen:  Blood from Arm, Right Updated: 02/14/25 1309     WBC 6.56 Thousand/uL      RBC 4.31 Million/uL      Hemoglobin 13.2 g/dL      Hematocrit 37.4 %      MCV 87 fL      MCH 30.6 pg      MCHC 35.3 g/dL      RDW 13.0 %      MPV 9.0 fL      Platelets 207 Thousands/uL      nRBC 0 /100 WBCs      Segmented % 69 %      Immature Grans % 1 %      Lymphocytes % 19 %      Monocytes % 8 %      Eosinophils Relative 2 %      Basophils Relative 1 %      Absolute Neutrophils 4.67 Thousands/µL      Absolute Immature Grans 0.03 Thousand/uL      Absolute Lymphocytes 1.23 Thousands/µL      Absolute Monocytes 0.49 Thousand/µL      Eosinophils Absolute 0.10 Thousand/µL      Basophils Absolute 0.04 Thousands/µL             XR chest 2 views   Final Interpretation by Nir Cooper MD (02/14 1987)      No acute cardiopulmonary disease.               Resident: Phil Aguillon I, the attending radiologist, have reviewed the images and agree with the final report above.      Workstation performed: [id number]             ECG 12 Lead Documentation Only    Date/Time: 2/14/2025 12:21 PM    Performed by: Jonathan Vogt PA-C  Authorized by: Jonathan Vogt PA-C    Indications / Diagnosis:  LUQ Pain; Burning in Chest  Patient location:  ED  Previous ECG:     Previous ECG:  Compared to current    Comparison ECG info:  2/5/2023    Similarity:  No change  Interpretation:     Interpretation: non-specific    Rate:     ECG rate:  76    ECG rate assessment: normal    Rhythm:     Rhythm: sinus rhythm    Ectopy:     Ectopy: none    QRS:     QRS axis:  Left  Conduction:     Conduction: normal    ST segments:     ST segments:  Normal  Comments:      Normal sinus rhythm with a rate of 76.  Left axis deviation.  No ectopy.  No STEMI.  QTc 416.      ED Medication and Procedure Management   Prior to Admission Medications   Prescriptions Last Dose Informant Patient Reported? Taking?   ALPRAZolam (XANAX) 0.5 mg tablet   Yes No   Sig:  Take 0.5 mg by mouth every 8 (eight) hours as needed   Aspirin Buf,CaCarb-MgCarb-MgO, 81 MG TABS   Yes No   Sig: Take 1 tablet by mouth daily   BD Pen Needle Cece 2nd Gen 32G X 4 MM MISC   No No   Sig: Use daily with Lantus   Cholecalciferol (Vitamin D) 50 MCG (2000 UT) CAPS   Yes No   Sig: Take 1 tablet by mouth daily   Lantus 100 UNIT/ML subcutaneous injection   No No   Sig: INJECT 0.2 ML (20 UNITS TOTAL) UNDER THE SKIN DAILY AT BEDTIME   Patient not taking: Reported on 2025   Multiple Vitamin (MULTIVITAMIN ADULT PO)   Yes No   Sig: Take by mouth   True Metrix Blood Glucose Test test strip   Yes No   Si (two) times a day Test as directed   Patient not taking: Reported on 10/7/2024   acetaminophen (TYLENOL) 500 mg tablet   Yes No   Sig: Take 500 mg by mouth every 6 (six) hours as needed for mild pain   allopurinol (ZYLOPRIM) 100 mg tablet   Yes No   amLODIPine (NORVASC) 5 mg tablet   Yes No   dexamethasone (DECADRON) 1 mg tablet   No No   Sig: Take 1 tab at 11pm and get cortisol levels checked between 7:00 a.m. and 9:00 a.m. on the next morning.   doxazosin (CARDURA) 4 mg tablet   Yes No   Sig: Take 4 mg by mouth 2 (two) times a day   hydrALAZINE (APRESOLINE) 50 mg tablet   Yes No   Si (two) times a day   hydrochlorothiazide (HYDRODIURIL) 25 mg tablet   Yes No   Sig: Take 25 mg by mouth daily   ibuprofen (MOTRIN) 200 mg tablet   Yes No   Sig: Take 200 mg by mouth every 6 (six) hours as needed for mild pain   losartan (COZAAR) 100 MG tablet   Yes No   metFORMIN (GLUCOPHAGE) 1000 MG tablet   Yes No   Si (two) times a day with meals   metoprolol tartrate (LOPRESSOR) 50 mg tablet   Yes No   Sig: every 12 (twelve) hours   simvastatin (ZOCOR) 10 mg tablet   Yes No   spironolactone (ALDACTONE) 25 mg tablet   Yes No      Facility-Administered Medications: None     Discharge Medication List as of 2025  3:27 PM        START taking these medications    Details   famotidine (PEPCID) 20 mg tablet Take 1  tablet (20 mg total) by mouth 2 (two) times a day, Starting Fri 2/14/2025, Normal      sucralfate (CARAFATE) 1 g tablet Take 1 tablet (1 g total) by mouth 4 (four) times a day, Starting Fri 2/14/2025, Normal           CONTINUE these medications which have NOT CHANGED    Details   acetaminophen (TYLENOL) 500 mg tablet Take 500 mg by mouth every 6 (six) hours as needed for mild pain, Historical Med      allopurinol (ZYLOPRIM) 100 mg tablet Starting Mon 10/18/2021, Historical Med      ALPRAZolam (XANAX) 0.5 mg tablet Take 0.5 mg by mouth every 8 (eight) hours as needed, Starting Tue 9/21/2021, Historical Med      amLODIPine (NORVASC) 5 mg tablet Starting Mon 10/18/2021, Historical Med      Aspirin Buf,CaCarb-MgCarb-MgO, 81 MG TABS Take 1 tablet by mouth daily, Historical Med      BD Pen Needle Cece 2nd Gen 32G X 4 MM MISC Use daily with Lantus, Normal      Cholecalciferol (Vitamin D) 50 MCG (2000 UT) CAPS Take 1 tablet by mouth daily, Historical Med      dexamethasone (DECADRON) 1 mg tablet Take 1 tab at 11pm and get cortisol levels checked between 7:00 a.m. and 9:00 a.m. on the next morning., Normal      doxazosin (CARDURA) 4 mg tablet Take 4 mg by mouth 2 (two) times a day, Starting Sat 9/25/2021, Historical Med      hydrALAZINE (APRESOLINE) 50 mg tablet 2 (two) times a day, Starting Mon 10/18/2021, Historical Med      hydrochlorothiazide (HYDRODIURIL) 25 mg tablet Take 25 mg by mouth daily, Historical Med      ibuprofen (MOTRIN) 200 mg tablet Take 200 mg by mouth every 6 (six) hours as needed for mild pain, Historical Med      Lantus 100 UNIT/ML subcutaneous injection INJECT 0.2 ML (20 UNITS TOTAL) UNDER THE SKIN DAILY AT BEDTIME, Normal      losartan (COZAAR) 100 MG tablet Starting Mon 10/18/2021, Historical Med      metFORMIN (GLUCOPHAGE) 1000 MG tablet 2 (two) times a day with meals, Starting Mon 10/18/2021, Historical Med      metoprolol tartrate (LOPRESSOR) 50 mg tablet every 12 (twelve) hours, Starting Mon  10/18/2021, Historical Med      Multiple Vitamin (MULTIVITAMIN ADULT PO) Take by mouth, Historical Med      simvastatin (ZOCOR) 10 mg tablet Starting Mon 10/18/2021, Historical Med      spironolactone (ALDACTONE) 25 mg tablet Starting Mon 10/18/2021, Historical Med      True Metrix Blood Glucose Test test strip 2 (two) times a day Test as directed, Starting Tue 4/11/2023, Historical Med             ED SEPSIS DOCUMENTATION   Time reflects when diagnosis was documented in both MDM as applicable and the Disposition within this note       Time User Action Codes Description Comment    2/14/2025  3:25 PM Jonathan Vogt Add [K21.9] GERD (gastroesophageal reflux disease)                  Jonathan Vogt PA-C  02/15/25 1027

## 2025-03-24 ENCOUNTER — HOSPITAL ENCOUNTER (OUTPATIENT)
Dept: NON INVASIVE DIAGNOSTICS | Facility: HOSPITAL | Age: 50
Discharge: HOME | End: 2025-03-24
Payer: COMMERCIAL

## 2025-03-24 ENCOUNTER — HOSPITAL ENCOUNTER (OUTPATIENT)
Dept: CT IMAGING | Facility: HOSPITAL | Age: 50
Discharge: HOME | End: 2025-03-24
Payer: COMMERCIAL

## 2025-03-24 VITALS
DIASTOLIC BLOOD PRESSURE: 69 MMHG | HEART RATE: 72 BPM | HEIGHT: 69 IN | SYSTOLIC BLOOD PRESSURE: 119 MMHG | BODY MASS INDEX: 46.65 KG/M2 | WEIGHT: 315 LBS

## 2025-03-24 DIAGNOSIS — I11.9 MALIGNANT HYPERTENSIVE HEART DISEASE WITHOUT HEART FAILURE: ICD-10-CM

## 2025-03-24 DIAGNOSIS — E83.50 DISORDER OF CALCIUM METABOLISM: ICD-10-CM

## 2025-03-24 DIAGNOSIS — R06.02 SHORTNESS OF BREATH: ICD-10-CM

## 2025-03-24 DIAGNOSIS — R07.9 CHEST PAIN, UNSPECIFIED TYPE: ICD-10-CM

## 2025-03-24 LAB
AORTIC ROOT: 3.1 CM
BSA FOR ECHO PROCEDURE: 2.54 M2
E WAVE DECELERATION TIME: 186 MS
E/A RATIO: 1.03
FRACTIONAL SHORTENING: 33 (ref 28–44)
INTERVENTRICULAR SEPTUM IN DIASTOLE (PARASTERNAL SHORT AXIS VIEW): 1.4 CM
INTERVENTRICULAR SEPTUM: 1.4 CM (ref 0.6–1.1)
LAAS-AP2: 26.6 CM2
LAAS-AP4: 27.6 CM2
LEFT ATRIUM SIZE: 5.3 CM
LEFT ATRIUM VOLUME (MOD BIPLANE): 98 ML
LEFT ATRIUM VOLUME INDEX (MOD BIPLANE): 38.6 ML/M2
LEFT INTERNAL DIMENSION IN SYSTOLE: 3.2 CM (ref 2.1–4)
LEFT VENTRICULAR INTERNAL DIMENSION IN DIASTOLE: 4.8 CM (ref 3.5–6)
LEFT VENTRICULAR POSTERIOR WALL IN END DIASTOLE: 1.4 CM
LEFT VENTRICULAR STROKE VOLUME: 68 ML
LV EF US.2D.A4C+ESTIMATED: 64 %
LVSV (TEICH): 68 ML
MV E'TISSUE VEL-SEP: 8 CM/S
MV PEAK A VEL: 0.78 M/S
MV PEAK E VEL: 80 CM/S
MV STENOSIS PRESSURE HALF TIME: 54 MS
MV VALVE AREA P 1/2 METHOD: 4.07
RIGHT ATRIAL 2D VOLUME: 69 ML
RIGHT ATRIUM AREA SYSTOLE A4C: 23.2 CM2
RIGHT VENTRICLE ID DIMENSION: 4.4 CM
SL CV LEFT ATRIUM LENGTH A2C: 6 CM
SL CV LV EF: 64
SL CV PED ECHO LEFT VENTRICLE DIASTOLIC VOLUME (MOD BIPLANE) 2D: 108 ML
SL CV PED ECHO LEFT VENTRICLE SYSTOLIC VOLUME (MOD BIPLANE) 2D: 41 ML
TR MAX PG: 25 MMHG
TR PEAK VELOCITY: 2.5 M/S
TRICUSPID ANNULAR PLANE SYSTOLIC EXCURSION: 3 CM

## 2025-03-24 PROCEDURE — 93306 TTE W/DOPPLER COMPLETE: CPT | Performed by: INTERNAL MEDICINE

## 2025-03-24 PROCEDURE — 93306 TTE W/DOPPLER COMPLETE: CPT

## 2025-03-24 PROCEDURE — 75571 CT HRT W/O DYE W/CA TEST: CPT

## 2025-03-24 RX ADMIN — PERFLUTREN 0.4 ML/MIN: 6.52 INJECTION, SUSPENSION INTRAVENOUS at 08:49

## 2025-03-25 ENCOUNTER — TELEPHONE (OUTPATIENT)
Dept: ENDOCRINOLOGY | Facility: CLINIC | Age: 50
End: 2025-03-25

## 2025-03-25 DIAGNOSIS — Z79.4 TYPE 2 DIABETES MELLITUS WITH HYPERGLYCEMIA, WITH LONG-TERM CURRENT USE OF INSULIN (HCC): ICD-10-CM

## 2025-03-25 DIAGNOSIS — E11.65 TYPE 2 DIABETES MELLITUS WITH HYPERGLYCEMIA, WITH LONG-TERM CURRENT USE OF INSULIN (HCC): ICD-10-CM

## 2025-03-25 NOTE — TELEPHONE ENCOUNTER
Reason for call:   [x] Prior Auth  [] Other:     Caller:  [] Patient  [x] Pharmacy  Pharmacy    [facility]/pharmacy #[id number] - [place], [place] - [address] [address]  Phone: [phone]  Fax: [phone]           Medication  Lantus 100 UNIT/ML subcutaneous injection [[id number]]  Lantus 100 UNIT/ML subcutaneous injection [[id number]]    Order Details  Dose, Route, Frequency: As Directed   Dispense Quantity: 90 mL Refills: 1          Sig: INJECT 0.2 ML (20 UNITS TOTAL) UNDER THE SKIN DAILY AT BEDTIME   Patient not taking: Reported on 1/14/2025         Start Date: 01/07/25 End Date: --   Written Date: 01/07/25 Expiration Date: 01/07/26       Associated Diagnoses: Type 2 diabetes mellitus with hyperglycemia, with long-term current use of insulin (HCC) [E11.65, Z79.4]   Original Order: Insulin Glargine-yfgn 100 UNIT/ML SOLN [[id number]]             Ordering Provider:   [] PCP/Provider -   [x] Speciality/Provider -   Providers    Ordering and Authorizing Provider:  Willian Severino MD  [address] [facility] [address]  Phone: [phone]   Fax: [phone]             Has the patient tried other medications and failed? If failed, which medications did they fail?    [] No   [x] Yes -     Is the patient's insurance updated in EPIC?   [x] Yes   [] No     Is a copy of the patient's insurance scanned in EPIC?   [x] Yes   [] No

## 2025-03-26 DIAGNOSIS — E11.65 TYPE 2 DIABETES MELLITUS WITH HYPERGLYCEMIA, WITH LONG-TERM CURRENT USE OF INSULIN (HCC): ICD-10-CM

## 2025-03-26 DIAGNOSIS — Z79.4 TYPE 2 DIABETES MELLITUS WITH HYPERGLYCEMIA, WITH LONG-TERM CURRENT USE OF INSULIN (HCC): ICD-10-CM

## 2025-03-26 RX ORDER — INSULIN GLARGINE 100 [IU]/ML
INJECTION, SOLUTION SUBCUTANEOUS
Refills: 0 | OUTPATIENT
Start: 2025-03-26

## 2025-03-26 RX ORDER — INSULIN GLARGINE-YFGN 100 [IU]/ML
INJECTION, SOLUTION SUBCUTANEOUS
Qty: 90 ML | Refills: 1 | OUTPATIENT
Start: 2025-03-26

## 2025-03-26 RX ORDER — INSULIN GLARGINE 100 [IU]/ML
INJECTION, SOLUTION SUBCUTANEOUS
Qty: 90 ML | Refills: 1 | OUTPATIENT
Start: 2025-03-26

## 2025-03-26 NOTE — TELEPHONE ENCOUNTER
Duplicate encounter created, please see telephone encounter from 01/08/2025 regarding Lantus PA status. Please review patient's chart to see if there is already an encounter regarding the medication in question and to document anything regarding this medication in regards to anything regarding the authorization process etc before creating another encounter Thank You.

## 2025-03-31 ENCOUNTER — TELEPHONE (OUTPATIENT)
Dept: GASTROENTEROLOGY | Facility: CLINIC | Age: 50
End: 2025-03-31

## 2025-03-31 ENCOUNTER — OFFICE VISIT (OUTPATIENT)
Dept: GASTROENTEROLOGY | Facility: CLINIC | Age: 50
End: 2025-03-31
Payer: COMMERCIAL

## 2025-03-31 ENCOUNTER — OFFICE VISIT (OUTPATIENT)
Dept: ENDOCRINOLOGY | Facility: CLINIC | Age: 50
End: 2025-03-31
Payer: COMMERCIAL

## 2025-03-31 VITALS
DIASTOLIC BLOOD PRESSURE: 76 MMHG | WEIGHT: 315 LBS | BODY MASS INDEX: 46.65 KG/M2 | HEART RATE: 73 BPM | RESPIRATION RATE: 18 BRPM | TEMPERATURE: 97.6 F | OXYGEN SATURATION: 97 % | HEIGHT: 69 IN | SYSTOLIC BLOOD PRESSURE: 134 MMHG

## 2025-03-31 VITALS
SYSTOLIC BLOOD PRESSURE: 138 MMHG | HEIGHT: 69 IN | BODY MASS INDEX: 46.65 KG/M2 | WEIGHT: 315 LBS | DIASTOLIC BLOOD PRESSURE: 86 MMHG | HEART RATE: 78 BPM

## 2025-03-31 DIAGNOSIS — K86.2 PANCREATIC CYST: ICD-10-CM

## 2025-03-31 DIAGNOSIS — E11.22 TYPE 2 DIABETES MELLITUS WITH STAGE 2 CHRONIC KIDNEY DISEASE, WITH LONG-TERM CURRENT USE OF INSULIN (HCC): Primary | ICD-10-CM

## 2025-03-31 DIAGNOSIS — E55.9 VITAMIN D DEFICIENCY: ICD-10-CM

## 2025-03-31 DIAGNOSIS — R63.5 WEIGHT GAIN: ICD-10-CM

## 2025-03-31 DIAGNOSIS — Z79.4 TYPE 2 DIABETES MELLITUS WITH HYPERGLYCEMIA, WITH LONG-TERM CURRENT USE OF INSULIN (HCC): ICD-10-CM

## 2025-03-31 DIAGNOSIS — Z12.11 SCREENING FOR COLON CANCER: ICD-10-CM

## 2025-03-31 DIAGNOSIS — N18.2 TYPE 2 DIABETES MELLITUS WITH STAGE 2 CHRONIC KIDNEY DISEASE, WITH LONG-TERM CURRENT USE OF INSULIN (HCC): Primary | ICD-10-CM

## 2025-03-31 DIAGNOSIS — E66.01 MORBID OBESITY (HCC): ICD-10-CM

## 2025-03-31 DIAGNOSIS — E78.2 MIXED HYPERLIPIDEMIA: ICD-10-CM

## 2025-03-31 DIAGNOSIS — E29.1 HYPOGONADISM, MALE: ICD-10-CM

## 2025-03-31 DIAGNOSIS — K76.0 HEPATIC STEATOSIS: ICD-10-CM

## 2025-03-31 DIAGNOSIS — Z79.4 TYPE 2 DIABETES MELLITUS WITH STAGE 2 CHRONIC KIDNEY DISEASE, WITH LONG-TERM CURRENT USE OF INSULIN (HCC): Primary | ICD-10-CM

## 2025-03-31 DIAGNOSIS — N18.31 STAGE 3A CHRONIC KIDNEY DISEASE (HCC): ICD-10-CM

## 2025-03-31 DIAGNOSIS — R13.10 DYSPHAGIA, UNSPECIFIED TYPE: ICD-10-CM

## 2025-03-31 DIAGNOSIS — K21.9 GERD (GASTROESOPHAGEAL REFLUX DISEASE): Primary | ICD-10-CM

## 2025-03-31 DIAGNOSIS — E11.65 TYPE 2 DIABETES MELLITUS WITH HYPERGLYCEMIA, WITH LONG-TERM CURRENT USE OF INSULIN (HCC): ICD-10-CM

## 2025-03-31 PROCEDURE — 99215 OFFICE O/P EST HI 40 MIN: CPT | Performed by: INTERNAL MEDICINE

## 2025-03-31 PROCEDURE — 99204 OFFICE O/P NEW MOD 45 MIN: CPT | Performed by: NURSE PRACTITIONER

## 2025-03-31 RX ORDER — POLYETHYLENE GLYCOL 3350, SODIUM CHLORIDE, SODIUM BICARBONATE, POTASSIUM CHLORIDE 420; 11.2; 5.72; 1.48 G/4L; G/4L; G/4L; G/4L
4000 POWDER, FOR SOLUTION ORAL ONCE
Qty: 4000 ML | Refills: 0 | Status: SHIPPED | OUTPATIENT
Start: 2025-03-31 | End: 2025-03-31

## 2025-03-31 RX ORDER — ROSUVASTATIN CALCIUM 20 MG/1
20 TABLET, COATED ORAL DAILY
COMMUNITY

## 2025-03-31 RX ORDER — PEN NEEDLE, DIABETIC 32GX 5/32"
NEEDLE, DISPOSABLE MISCELLANEOUS
Qty: 90 EACH | Refills: 1 | Status: SHIPPED | OUTPATIENT
Start: 2025-03-31

## 2025-03-31 RX ORDER — TIRZEPATIDE 2.5 MG/.5ML
2.5 INJECTION, SOLUTION SUBCUTANEOUS WEEKLY
Qty: 2 ML | Refills: 0 | Status: SHIPPED | OUTPATIENT
Start: 2025-03-31

## 2025-03-31 RX ORDER — EZETIMIBE 10 MG/1
10 TABLET ORAL DAILY
COMMUNITY

## 2025-03-31 RX ORDER — INSULIN GLARGINE 100 [IU]/ML
INJECTION, SOLUTION SUBCUTANEOUS
Qty: 15 ML | Refills: 1 | Status: SHIPPED | OUTPATIENT
Start: 2025-03-31

## 2025-03-31 NOTE — PROGRESS NOTES
"Name: Vel Fuentes      : 1975      MRN: [id number]  Encounter Provider: SONA Thurman  Encounter Date: 3/31/2025   Encounter department: [facility] GASTROENTEROLOGY [place] [address]  :  Assessment & Plan  GERD (gastroesophageal reflux disease)  Patient with ED visit 2025 as below, symptoms resolved with GI cocktail at the time. Patient denies any further symptoms since that time but was taking Pepcid and Carafate daily as ordered. Recently ran out of these medications but did not have recurrence of symptoms. Does report periodic dysphagia with solids as below. Patient would benefit from endoscopy for further evaluation, can also consider screening colonoscopy at the same time as EGD. Would recommended optimization/clearance from cardiology first due to findings from cardiac workup as below. Patient reports that he will be seeing his cardiologist for a follow up appointment tomorrow. Can consider medication pending results of EGD. Continue anti-reflux dietary measures.   Orders:    Ambulatory Referral to Gastroenterology    EGD; Future    Pancreatic cyst  History of pancreatic cyst, most recent MRI/MRCP 2025 showed \"unchanged 8 to 9 mm pancreatic neck cyst\" with recommended follow-up in one year. Order placed for MRI/MRCP to be completed in one year.   Orders:    EGD; Future    MRI abdomen w wo contrast and mrcp; Future    Hepatic steatosis  On recent MRI/MRCP 2025 patient was noted to have hepatomegaly with severe hepatic steatosis. FIB-4 score 1.1 which suggests absence of advanced fibrosis. LFTs WNL on most recent CMP 2025. Patient is morbidly obese with BMI >47, would benefit from weight loss of 7-10% of his total body weight & tight management of glucose/cholesterol. Reports he may be started on a GLP-1 soon. Denies any alcohol use.       Screening for colon cancer  Patient reports that his last colonoscopy was about 10 years ago at outside facility with " possible removal of benign polyps but cannot remember where this was done or who performed it. Recommend screening colonoscopy however would want optimization from cardiac standpoint first and clearance from patient's cardiologist.   Orders:    Colonoscopy; Future    polyethylene glycol-electrolytes (TriLyte) 4000 mL solution; Take 4,000 mL by mouth once for 1 dose Take 4000 mL by mouth once for 1 dose. Use as directed    Dysphagia, unspecified type  Patient reports episodes of dysphagia with solids on occasion and primarily dry goods. Would benefit from EGD for further evaluation as above.   Orders:    EGD; Future        History of Present Illness   Vel Fuentes is a 49 y.o. male who presents as a referral after ED visit 2/14/2025. He reports that he went to the ED because he was having pain in the center of his chest. Per chart review EKG was done which showed NSR, CXR without acute cardiopulmonary disease, negative troponins. Patient was given IV fluids and GI cocktail and symptoms completely resolved. Was referred to cardiology because heart score was 5. He was sent home with Pepcid and Carafate which patient reports he took daily as prescribed but recently ran out of the medications and had no further refills. He reports that since ED visit he has not had any further episodes of chest pain. He denies heartburn, regurgitation, abdominal pain, nausea, vomiting. He does report intermittent episodes of dysphagia but states that these are rare and he cannot remember what foods caused symptoms. He has been trying to avoid trigger foods such as tomato sauce as he reports that this has caused heartburn in the past. He also denies change in bowel habits, stating that he goes 1-2 times per day, denies diarrhea or constipation as well as melena or hematochezia. He reports that he had EGD/colonoscopy done about 10 years ago but cannot remember where this was done or what his results were. Reports history of colon  "polyps in family but denies family history of colon cancer. He smokes cigars occasionally. Denies alcohol use or recreational drug use.     Patient reports that he did establish care with a new cardiologist since ED visit and has follow up appointment tomorrow. Since ED visit CT coronary calcium score was done with total coronary calcium score of 1142. He also had ECHO done which showed findings as below:    Left Ventricle: Left ventricular cavity size is normal. Wall thickness is mildly increased. There is mild to moderate concentric hypertrophy. The left ventricular ejection fraction is 64% by biplane measurement. Systolic function is normal. Wall motion is normal. Diastolic function is normal.  Left atrial filling pressure is normal.    Left Atrium: The atrium is mildly dilated (35-41 mL/m2).    Right Atrium: The atrium is mildly dilated.      HPI    Review of Systems A complete review of systems is negative other than that noted above in the HPI.    Medical History Reviewed by provider this encounter:  Tobacco  Allergies  Meds  Problems  Med Hx  Surg Hx  Fam Hx     .     Objective   /86 (BP Location: Left arm, Patient Position: Sitting, Cuff Size: Large)   Pulse 78   Ht 5' 9\" (1.753 m)   Wt (!) 147 kg (324 lb)   BMI 47.85 kg/m²     Physical Exam  Constitutional:       General: He is not in acute distress.     Appearance: Normal appearance. He is obese.   HENT:      Head: Normocephalic and atraumatic.   Cardiovascular:      Rate and Rhythm: Normal rate and regular rhythm.   Pulmonary:      Effort: Pulmonary effort is normal. No respiratory distress.   Abdominal:      General: Bowel sounds are normal.      Palpations: Abdomen is soft.      Tenderness: There is no abdominal tenderness. There is no guarding or rebound.   Skin:     General: Skin is warm and dry.   Neurological:      General: No focal deficit present.      Mental Status: He is alert.   Psychiatric:         Mood and Affect: Mood " normal.         Behavior: Behavior normal.          Lab Results: I personally reviewed relevant lab results. CBC/BMP: No new results in last 24 hours. , LFTs: No new results in last 24 hours.

## 2025-03-31 NOTE — TELEPHONE ENCOUNTER
Scheduled date of EGD/colonoscopy (as of today):5/6/25  Physician performing EGD/colonoscopy:Dr Sanchez   Location of EGD/colonoscopy:   Desired bowel prep reviewed with patient:cary  Instructions reviewed with patient by:praneeth  Clearances: Dr Proctor     Metformin, lantus-pt received golytely and diabetic instructions at ov. Sb       Cardiac clearance faxed to Dr Proctor. Sb

## 2025-03-31 NOTE — ASSESSMENT & PLAN NOTE
Today we discussed all aspects of hypogonadism including normal physiology, pathophysiology, contributing systemic conditions, common symptoms, management principles, complications of treatment, treatment contracts for testosterone replacement and goals of therapy.  Advised to do labs ordered last visit

## 2025-03-31 NOTE — ASSESSMENT & PLAN NOTE
He seems to be poorly controlled. His diabetes is complicated by severe obesity, rt leg non healing ulcer, CKD3 and potentially HF.  He did not start either insulin or GLP 1 agonist.  Advised to maintain goal blood sugars 70-180mg/dL.    We agreed to continue metformin and add a basal insulin (Rxed lantus pen) and GLP1 agonist. He may start mounjaro after completing his EGD and colonoscopy scheduled 5/25.    We may consider an SGLT2i once A1c is about 8% due to increased risks from hyperglucosuria.    Repeat labs prior to next visit.  Encouraged medical fitness training as possible.  Follow up in 2 months.    Lab Results   Component Value Date    HGBA1C 11.0 (H) 02/05/2023

## 2025-03-31 NOTE — PROGRESS NOTES
"    Follow-up Patient Progress Note      CC: Diabetes     History of Present Illness:   49 yr male with T2DM, HTN, HLD, SANJIV, morbid obesity BMI 48, CKD 3a and vit D deficiency. Last visit was 12/9/24.    Since last visit, he lost 16 lbs with dietary changes. he could not get started with insulin and GLP 1 agonist due to financial issues.    Mac adult weight: 350 lbs. Lowest adult weight: 300 lbs in 2018.     SAGM:     Current meds:  Metformin 1000mg PO BID     Opthamology: yes, 1yr ago - due soon  Podiatry: sent referral  vaccination:   Dental:  Pancreatitis:      Ace/ARB: losartan  Statin: zocor  Thyroid issues:      Physical Exam:  Body mass index is 46.52 kg/m².  /76 (BP Location: Left arm, Patient Position: Sitting)   Pulse 73   Temp 97.6 °F (36.4 °C) (Tympanic)   Resp 18   Ht 5' 9\" (1.753 m)   Wt (!) 143 kg (315 lb)   SpO2 97%   BMI 46.52 kg/m²    Vitals:    03/31/25 1056   Weight: (!) 143 kg (315 lb)        Physical Exam  Constitutional:       General: He is not in acute distress.     Appearance: He is well-developed.   HENT:      Head: Normocephalic and atraumatic.      Nose: Nose normal.   Eyes:      Conjunctiva/sclera: Conjunctivae normal.   Pulmonary:      Effort: Pulmonary effort is normal.   Abdominal:      General: There is no distension.   Musculoskeletal:      Cervical back: Normal range of motion and neck supple.   Skin:     Findings: No rash.      Comments: No icterus   Neurological:      Mental Status: He is alert and oriented to person, place, and time.         Labs:   Lab Results   Component Value Date    HGBA1C 11.0 (H) 02/05/2023       Lab Results   Component Value Date    YZR1TEGXJWTC 1.140 02/14/2025       Lab Results   Component Value Date    CREATININE 1.60 (H) 02/14/2025    CREATININE 1.40 (H) 02/06/2023    CREATININE 1.50 (H) 02/05/2023    BUN 37 (H) 02/14/2025    K 4.6 02/14/2025     02/14/2025    CO2 19 (L) 02/14/2025     eGFR   Date Value Ref Range Status "   02/14/2025 49 ml/min/1.73sq m Final       Lab Results   Component Value Date    ALT 34 02/14/2025    AST 26 02/14/2025    ALKPHOS 56 02/14/2025       Lab Results   Component Value Date    CHOLESTEROL 109 02/05/2023     Lab Results   Component Value Date    HDL 32 (L) 02/05/2023     Lab Results   Component Value Date    TRIG 324 (H) 02/05/2023     Lab Results   Component Value Date    NONHDLC 77 02/05/2023         Assessment/Plan:    1. Type 2 diabetes mellitus with stage 2 chronic kidney disease, with long-term current use of insulin (HCC)  Assessment & Plan:  He seems to be poorly controlled. His diabetes is complicated by severe obesity, rt leg non healing ulcer, CKD3 and potentially HF.  He did not start either insulin or GLP 1 agonist.  Advised to maintain goal blood sugars 70-180mg/dL.    We agreed to continue metformin and add a basal insulin (Rxed lantus pen) and GLP1 agonist. He may start mounjaro after completing his EGD and colonoscopy scheduled 5/25.    We may consider an SGLT2i once A1c is about 8% due to increased risks from hyperglucosuria.    Repeat labs prior to next visit.  Encouraged medical fitness training as possible.  Follow up in 2 months.    Lab Results   Component Value Date    HGBA1C 11.0 (H) 02/05/2023     Orders:  -     Mounjaro 2.5 MG/0.5ML SOAJ; Inject 2.5 mg under the skin once a week  -     Insulin Glargine Solostar (Lantus SoloStar) 100 UNIT/ML SOPN; Use 20u subcutaneous daily bedtime  -     Hemoglobin A1C; Future  -     Albumin / creatinine urine ratio; Future  -     Ambulatory referral to Podiatry; Future  2. Hypogonadism, male  Assessment & Plan:  Today we discussed all aspects of hypogonadism including normal physiology, pathophysiology, contributing systemic conditions, common symptoms, management principles, complications of treatment, treatment contracts for testosterone replacement and goals of therapy.  Advised to do labs ordered last visit    3. Mixed hyperlipidemia  4.  Morbid obesity (HCC)  5. Vitamin D deficiency  6. Type 2 diabetes mellitus with hyperglycemia, with long-term current use of insulin (HCC)  -     BD Pen Needle Cece 2nd Gen 32G X 4 MM MISC; Use daily with Lantus  7. Weight gain  -     T4, free; Future  -     TSH, 3rd generation; Future  -     T4, free  -     TSH, 3rd generation  8. Stage 3a chronic kidney disease (HCC)        I have spent a total time of 40 minutes on 03/31/25 in caring for this patient including greater than 50% of this time was spent in counseling/coordination of care as listed above.       Discussed with the patient and all questioned fully answered. He will contact me with concerns.    Willian Severino

## 2025-04-08 DIAGNOSIS — Z79.4 TYPE 2 DIABETES MELLITUS WITH STAGE 2 CHRONIC KIDNEY DISEASE, WITH LONG-TERM CURRENT USE OF INSULIN (HCC): ICD-10-CM

## 2025-04-08 DIAGNOSIS — N18.2 TYPE 2 DIABETES MELLITUS WITH STAGE 2 CHRONIC KIDNEY DISEASE, WITH LONG-TERM CURRENT USE OF INSULIN (HCC): ICD-10-CM

## 2025-04-08 DIAGNOSIS — E11.22 TYPE 2 DIABETES MELLITUS WITH STAGE 2 CHRONIC KIDNEY DISEASE, WITH LONG-TERM CURRENT USE OF INSULIN (HCC): ICD-10-CM

## 2025-04-09 RX ORDER — INSULIN GLARGINE 100 [IU]/ML
INJECTION, SOLUTION SUBCUTANEOUS
Refills: 0 | OUTPATIENT
Start: 2025-04-09

## 2025-04-30 LAB
T4 FREE SERPL-MCNC: 1.3 NG/DL (ref 0.8–1.8)
TSH SERPL-ACNC: 1.14 MIU/L (ref 0.4–4.5)

## 2025-05-06 ENCOUNTER — HOSPITAL ENCOUNTER (OUTPATIENT)
Dept: CT IMAGING | Facility: HOSPITAL | Age: 50
Discharge: HOME | End: 2025-05-06
Payer: COMMERCIAL

## 2025-05-06 VITALS — SYSTOLIC BLOOD PRESSURE: 106 MMHG | DIASTOLIC BLOOD PRESSURE: 57 MMHG | HEART RATE: 72 BPM

## 2025-05-06 DIAGNOSIS — R94.30 NONSPECIFIC ABNORMAL CARDIOVASCULAR FUNCTION STUDY: ICD-10-CM

## 2025-05-06 DIAGNOSIS — I25.10 ATHEROSCLEROSIS OF NATIVE CORONARY ARTERY OF NATIVE HEART WITHOUT ANGINA PECTORIS: ICD-10-CM

## 2025-05-06 PROCEDURE — 75574 CT ANGIO HRT W/3D IMAGE: CPT

## 2025-05-06 RX ORDER — NITROGLYCERIN 0.4 MG/1
0.8 TABLET SUBLINGUAL ONCE
Status: COMPLETED | OUTPATIENT
Start: 2025-05-06 | End: 2025-05-06

## 2025-05-06 RX ORDER — METOPROLOL TARTRATE 1 MG/ML
5 INJECTION, SOLUTION INTRAVENOUS
Status: DISCONTINUED | OUTPATIENT
Start: 2025-05-06 | End: 2025-05-10 | Stop reason: HOSPADM

## 2025-05-06 RX ADMIN — IOHEXOL 228 ML: 350 INJECTION, SOLUTION INTRAVENOUS at 10:16

## 2025-05-06 RX ADMIN — NITROGLYCERIN 0.8 MG: 0.4 TABLET SUBLINGUAL at 09:50

## 2025-05-06 NOTE — NURSING NOTE
Patient arrived for coronary CT angiography. Test education reviewed with patient. Medications and allergies verified. Pt denies PDE5 inhibitor use. Patient took lopressor as instructed by cardiology. SL nitro given per protocol. See vitals flowsheet. Tolerated test well. Instructed to increase fluid intake remainder of day. Vitals stable, patient asymptomatic upon departure with brother.

## 2025-05-06 NOTE — LETTER
[facility]  [address] [place] [address]      May 12, 2025    MRN: [id number]     Phone: [phone]     Dear Mr. Mckeeelaine,    You recently had a(n) Cat Scan performed on 5/6/2025 at  [facility] that was requested by Alfredo Proctor MD. The study was reviewed by a radiologist, which is a physician who specializes in medical imaging. The radiologist issued a report describing his or her findings. In that report there was a finding that the radiologist felt warranted further discussion with your health care provider and that discussion would be beneficial to you.      The results were sent to Alfredo Proctor MD on 05/06/2025  4:11 PM. We recommend that you contact Alfredo Proctor MD at [phone] or set up an appointment to discuss the results of the imaging test. If you have already heard from Alfredo Proctor MD regarding the results of your study, you can disregard this letter.     This letter is not meant to alarm you, but intended to encourage you to follow-up on your results with the provider that sent you for the imaging study. In addition, we have enclosed answers to frequently asked questions by other patients who have also received a letter to review results with their health care provider (see page two).      Thank you for choosing [facility] for your medical imaging needs.                                                                                                                                                        FREQUENTLY ASKED QUESTIONS    Why am I receiving this letter?  [place] State Law requires us to notify patients who have findings on imaging exams that may require more testing or follow-up with a health professional within the next 3 months.        How serious is the finding on the imaging test?  This letter is sent to all patients who may need follow-up or more testing within the next 3 months.   Receiving this letter does not necessarily mean you have a life-threatening imaging finding or disease.  Recommendations in the radiologist’s imaging report are general in nature and it is up to your healthcare provider to say whether those recommendations make sense for your situation.  You are strongly encouraged to talk to your health care provider about the results and ask whether additional steps need to be taken.    Where can I get a copy of the final report for my recent radiology exam?  To get a full copy of the report you can access your records online at [email or web address] or please contact [facility]’s Medical Records Department at [phone] Monday through Friday between 8 am and 6 pm.         What do I need to do now?           Please contact your health care provider who requested the imaging study to discuss what further actions (if any) are needed.  You may have already heard from (your ordering provider) in regard to this test in which case you can disregard this letter.        NOTICE IN ACCORDANCE WITH THE [place] STATE “PATIENT TEST RESULT INFORMATION ACT OF 2018”    You are receiving this notice as a result of a determination by your diagnostic imaging service that further discussions of your test results are warranted and would be beneficial to you.    The complete results of your test or tests have been or will be sent to the health care practitioner that ordered the test or tests. It is recommended that you contact your health care practitioner to discuss your results as soon as possible.

## 2025-05-19 ENCOUNTER — OFFICE VISIT (OUTPATIENT)
Dept: PULMONOLOGY | Facility: CLINIC | Age: 50
End: 2025-05-19
Payer: COMMERCIAL

## 2025-05-19 VITALS
HEART RATE: 90 BPM | HEIGHT: 69 IN | BODY MASS INDEX: 46.36 KG/M2 | OXYGEN SATURATION: 98 % | WEIGHT: 313 LBS | SYSTOLIC BLOOD PRESSURE: 128 MMHG | DIASTOLIC BLOOD PRESSURE: 76 MMHG | TEMPERATURE: 98.5 F

## 2025-05-19 DIAGNOSIS — I10 PRIMARY HYPERTENSION: ICD-10-CM

## 2025-05-19 DIAGNOSIS — E66.01 MORBID OBESITY (HCC): ICD-10-CM

## 2025-05-19 DIAGNOSIS — G47.33 OSA (OBSTRUCTIVE SLEEP APNEA): Primary | ICD-10-CM

## 2025-05-19 PROCEDURE — 99213 OFFICE O/P EST LOW 20 MIN: CPT | Performed by: INTERNAL MEDICINE

## 2025-05-19 RX ORDER — MUPIROCIN 20 MG/G
OINTMENT TOPICAL
COMMUNITY
Start: 2025-04-14

## 2025-05-19 RX ORDER — AMMONIUM LACTATE 12 G/100G
LOTION TOPICAL
COMMUNITY
Start: 2025-04-09

## 2025-05-19 RX ORDER — KETOCONAZOLE 20 MG/G
CREAM TOPICAL
COMMUNITY
Start: 2025-04-07

## 2025-05-19 NOTE — PROGRESS NOTES
Name: Vel Fuentes      : 1975      MRN: [id number]  Encounter Provider: Jeremy Jacobsen MD  Encounter Date: 2025   Encounter department: [facility]ARY [facility]  :  Assessment & Plan  SANJIV (obstructive sleep apnea)  Mr.Joseph Fuentes was diagnosed with obstructive sleep apnea more than 10 years back and was started on CPAP therapy.  He has been using the CPAP regularly since then.  His last titration study was more than 5 years back.  He  has been buying the supplies online.  Previously he was with the [employer].  He used to follow with Dr. Merlos..  He has hypertension as comorbidity.  On clinical examination, he was morbidly obese and had Mallampati class 3 oropharyngeal crowding.   his repeat sleep study showed BiPAP requirement of 20/16 with a full face mask to correct his sleep apnea.  He has been using the BIPAP regularly and his compliance has been excellent.  His residual AHI was low.  He is motivated to continue on BiPAP therapy.  He is getting clinical benefit from BiPAP therapy.  I advised him to continue with CPAP therapy as before.  I had a long discussion with him and I have answered all his questions.        Morbid obesity (HCC)  He is morbidly obese and understands the need for weight reduction. He understands that weight reduction can significantly improve his sleep apnea other symptoms .        Primary hypertension  He has history of hypertension and currently this is controlled with spironolactone metoprolol and losartan. He also has CKD.                 History of Present Illness   HPI  Vel Fuentes is a 49 y.o. male with past medical history of obstructive sleep apnea currently on BiPAP therapy.  Currently he is using the BiPAP regularly and his compliance has been good.  His residual AHI has been low.  He is motivated to continue on PAP therapy.  Currently he has no significant daytime sleepiness or morning headache.  His sleep  "is less disturbed.  I reviewed his CPAP compliance data.  He is obese and trying to lose weight.  He was on Wegovy.  He denied any shortness of breath or cough or phlegm or wheeze or chest pain.  No swelling of feet.  He is very motivated to continue on CPAP therapy.  He had history of hypertension and currently this is controlled with the spironolactone losartan and metoprolol.      Review of Systems   Constitutional:  Negative for appetite change, chills, fatigue and fever.   HENT:  Positive for rhinorrhea. Negative for hearing loss, sneezing and trouble swallowing.    Respiratory:  Negative for cough, chest tightness, shortness of breath and wheezing.    Cardiovascular:  Positive for palpitations. Negative for chest pain and leg swelling.   Gastrointestinal:  Negative for abdominal pain, constipation, diarrhea, nausea and vomiting.   Genitourinary:  Negative for dysuria, frequency and urgency.   Musculoskeletal:  Positive for arthralgias. Negative for back pain and gait problem.   Skin:  Negative for rash.   Allergic/Immunologic: Negative for environmental allergies.   Neurological:  Negative for dizziness, syncope, light-headedness and headaches.   Psychiatric/Behavioral:  Negative for agitation, confusion and sleep disturbance. The patient is not nervous/anxious.           Objective   /76 (BP Location: Left arm, Patient Position: Sitting, Cuff Size: Standard)   Pulse 90   Temp 98.5 °F (36.9 °C) (Tympanic)   Ht 5' 9\" (1.753 m)   Wt (!) 142 kg (313 lb)   SpO2 98%   BMI 46.22 kg/m²      Physical Exam  Vitals reviewed.   Constitutional:       General: He is not in acute distress.     Appearance: He is obese. He is not ill-appearing, toxic-appearing or diaphoretic.   HENT:      Head: Normocephalic.      Mouth/Throat:      Mouth: Mucous membranes are moist.     Eyes:      General: No scleral icterus.     Conjunctiva/sclera: Conjunctivae normal.       Cardiovascular:      Rate and Rhythm: Normal rate and " regular rhythm.      Heart sounds: Normal heart sounds. No murmur heard.  Pulmonary:      Effort: No respiratory distress.      Breath sounds: Normal breath sounds. No stridor. No wheezing, rhonchi or rales.   Abdominal:      Palpations: Abdomen is soft.      Tenderness: There is no guarding.     Musculoskeletal:         General: No swelling.      Cervical back: Neck supple. No rigidity.      Right lower leg: No edema.      Left lower leg: No edema.   Lymphadenopathy:      Cervical: No cervical adenopathy.     Skin:     Coloration: Skin is not jaundiced or pale.      Findings: No rash.     Neurological:      Mental Status: He is alert and oriented to person, place, and time.      Gait: Gait normal.     Psychiatric:         Mood and Affect: Mood normal.         Behavior: Behavior normal.         Thought Content: Thought content normal.         Judgment: Judgment normal.

## 2025-05-19 NOTE — ASSESSMENT & PLAN NOTE
Mr.Joseph Fuentes was diagnosed with obstructive sleep apnea more than 10 years back and was started on CPAP therapy.  He has been using the CPAP regularly since then.  His last titration study was more than 5 years back.  He  has been buying the supplies online.  Previously he was with the [facility].  He used to follow with Dr. Merlos..  He has hypertension as comorbidity.  On clinical examination, he was morbidly obese and had Mallampati class 3 oropharyngeal crowding.   his repeat sleep study showed BiPAP requirement of 20/16 with a full face mask to correct his sleep apnea.  He has been using the BIPAP regularly and his compliance has been excellent.  His residual AHI was low.  He is motivated to continue on BiPAP therapy.  He is getting clinical benefit from BiPAP therapy.  I advised him to continue with CPAP therapy as before.  I had a long discussion with him and I have answered all his questions.

## 2025-05-31 ENCOUNTER — HOSPITAL ENCOUNTER (EMERGENCY)
Facility: HOSPITAL | Age: 50
Discharge: HOME | End: 2025-05-31
Attending: EMERGENCY MEDICINE | Admitting: EMERGENCY MEDICINE
Payer: COMMERCIAL

## 2025-05-31 ENCOUNTER — APPOINTMENT (EMERGENCY)
Dept: CT IMAGING | Facility: HOSPITAL | Age: 50
End: 2025-05-31
Payer: COMMERCIAL

## 2025-05-31 ENCOUNTER — APPOINTMENT (EMERGENCY)
Dept: RADIOLOGY | Facility: HOSPITAL | Age: 50
End: 2025-05-31
Payer: COMMERCIAL

## 2025-05-31 VITALS
DIASTOLIC BLOOD PRESSURE: 62 MMHG | RESPIRATION RATE: 18 BRPM | OXYGEN SATURATION: 97 % | SYSTOLIC BLOOD PRESSURE: 126 MMHG | HEART RATE: 80 BPM | TEMPERATURE: 98.9 F

## 2025-05-31 DIAGNOSIS — R51.9 HEADACHE: Primary | ICD-10-CM

## 2025-05-31 DIAGNOSIS — H40.053 ELEVATED IOP, BILATERAL: ICD-10-CM

## 2025-05-31 DIAGNOSIS — R07.9 CHEST PAIN: ICD-10-CM

## 2025-05-31 LAB
2HR DELTA HS TROPONIN: 2 NG/L
ALBUMIN SERPL BCG-MCNC: 4.7 G/DL (ref 3.5–5)
ALP SERPL-CCNC: 43 U/L (ref 34–104)
ALT SERPL W P-5'-P-CCNC: 29 U/L (ref 7–52)
ANION GAP SERPL CALCULATED.3IONS-SCNC: 10 MMOL/L (ref 4–13)
AST SERPL W P-5'-P-CCNC: 26 U/L (ref 13–39)
BASOPHILS # BLD AUTO: 0.04 THOUSANDS/ÂΜL (ref 0–0.1)
BASOPHILS NFR BLD AUTO: 1 % (ref 0–1)
BILIRUB SERPL-MCNC: 0.42 MG/DL (ref 0.2–1)
BUN SERPL-MCNC: 37 MG/DL (ref 5–25)
CALCIUM SERPL-MCNC: 10.5 MG/DL (ref 8.4–10.2)
CARDIAC TROPONIN I PNL SERPL HS: 17 NG/L (ref ?–50)
CARDIAC TROPONIN I PNL SERPL HS: 19 NG/L (ref ?–50)
CHLORIDE SERPL-SCNC: 104 MMOL/L (ref 96–108)
CO2 SERPL-SCNC: 21 MMOL/L (ref 21–32)
CREAT SERPL-MCNC: 1.74 MG/DL (ref 0.6–1.3)
EOSINOPHIL # BLD AUTO: 0.09 THOUSAND/ÂΜL (ref 0–0.61)
EOSINOPHIL NFR BLD AUTO: 1 % (ref 0–6)
ERYTHROCYTE [DISTWIDTH] IN BLOOD BY AUTOMATED COUNT: 12.8 % (ref 11.6–15.1)
GFR SERPL CREATININE-BSD FRML MDRD: 44 ML/MIN/1.73SQ M
GLUCOSE SERPL-MCNC: 121 MG/DL (ref 65–140)
HCT VFR BLD AUTO: 35.3 % (ref 36.5–49.3)
HGB BLD-MCNC: 12.1 G/DL (ref 12–17)
IMM GRANULOCYTES # BLD AUTO: 0.01 THOUSAND/UL (ref 0–0.2)
IMM GRANULOCYTES NFR BLD AUTO: 0 % (ref 0–2)
LIPASE SERPL-CCNC: 61 U/L (ref 11–82)
LYMPHOCYTES # BLD AUTO: 1.29 THOUSANDS/ÂΜL (ref 0.6–4.47)
LYMPHOCYTES NFR BLD AUTO: 19 % (ref 14–44)
MCH RBC QN AUTO: 30.2 PG (ref 26.8–34.3)
MCHC RBC AUTO-ENTMCNC: 34.3 G/DL (ref 31.4–37.4)
MCV RBC AUTO: 88 FL (ref 82–98)
MONOCYTES # BLD AUTO: 0.47 THOUSAND/ÂΜL (ref 0.17–1.22)
MONOCYTES NFR BLD AUTO: 7 % (ref 4–12)
NEUTROPHILS # BLD AUTO: 4.74 THOUSANDS/ÂΜL (ref 1.85–7.62)
NEUTS SEG NFR BLD AUTO: 72 % (ref 43–75)
NRBC BLD AUTO-RTO: 0 /100 WBCS
PLATELET # BLD AUTO: 153 THOUSANDS/UL (ref 149–390)
PMV BLD AUTO: 9.2 FL (ref 8.9–12.7)
POTASSIUM SERPL-SCNC: 3.9 MMOL/L (ref 3.5–5.3)
PROT SERPL-MCNC: 7.2 G/DL (ref 6.4–8.4)
RBC # BLD AUTO: 4.01 MILLION/UL (ref 3.88–5.62)
SODIUM SERPL-SCNC: 135 MMOL/L (ref 135–147)
WBC # BLD AUTO: 6.64 THOUSAND/UL (ref 4.31–10.16)

## 2025-05-31 PROCEDURE — 83690 ASSAY OF LIPASE: CPT | Performed by: EMERGENCY MEDICINE

## 2025-05-31 PROCEDURE — 36415 COLL VENOUS BLD VENIPUNCTURE: CPT | Performed by: EMERGENCY MEDICINE

## 2025-05-31 PROCEDURE — 80053 COMPREHEN METABOLIC PANEL: CPT | Performed by: EMERGENCY MEDICINE

## 2025-05-31 PROCEDURE — 96375 TX/PRO/DX INJ NEW DRUG ADDON: CPT

## 2025-05-31 PROCEDURE — 93005 ELECTROCARDIOGRAM TRACING: CPT

## 2025-05-31 PROCEDURE — 84484 ASSAY OF TROPONIN QUANT: CPT | Performed by: EMERGENCY MEDICINE

## 2025-05-31 PROCEDURE — 70450 CT HEAD/BRAIN W/O DYE: CPT

## 2025-05-31 PROCEDURE — 85025 COMPLETE CBC W/AUTO DIFF WBC: CPT | Performed by: EMERGENCY MEDICINE

## 2025-05-31 PROCEDURE — 96365 THER/PROPH/DIAG IV INF INIT: CPT

## 2025-05-31 PROCEDURE — 99285 EMERGENCY DEPT VISIT HI MDM: CPT | Performed by: EMERGENCY MEDICINE

## 2025-05-31 PROCEDURE — 71046 X-RAY EXAM CHEST 2 VIEWS: CPT

## 2025-05-31 PROCEDURE — 99284 EMERGENCY DEPT VISIT MOD MDM: CPT

## 2025-05-31 PROCEDURE — 96361 HYDRATE IV INFUSION ADD-ON: CPT

## 2025-05-31 RX ORDER — METOCLOPRAMIDE HYDROCHLORIDE 5 MG/ML
10 INJECTION INTRAMUSCULAR; INTRAVENOUS ONCE
Status: COMPLETED | OUTPATIENT
Start: 2025-05-31 | End: 2025-05-31

## 2025-05-31 RX ORDER — ACETAMINOPHEN 10 MG/ML
1000 INJECTION, SOLUTION INTRAVENOUS ONCE
Status: COMPLETED | OUTPATIENT
Start: 2025-05-31 | End: 2025-05-31

## 2025-05-31 RX ORDER — SUCRALFATE 1 G/1
1 TABLET ORAL 4 TIMES DAILY
Qty: 56 TABLET | Refills: 0 | Status: SHIPPED | OUTPATIENT
Start: 2025-05-31 | End: 2025-06-14

## 2025-05-31 RX ORDER — DIPHENHYDRAMINE HYDROCHLORIDE AND LIDOCAINE HYDROCHLORIDE AND ALUMINUM HYDROXIDE AND MAGNESIUM HYDRO
10 KIT ONCE
Status: COMPLETED | OUTPATIENT
Start: 2025-05-31 | End: 2025-05-31

## 2025-05-31 RX ORDER — MAGNESIUM HYDROXIDE/ALUMINUM HYDROXICE/SIMETHICONE 120; 1200; 1200 MG/30ML; MG/30ML; MG/30ML
30 SUSPENSION ORAL ONCE
Status: COMPLETED | OUTPATIENT
Start: 2025-05-31 | End: 2025-05-31

## 2025-05-31 RX ORDER — SODIUM CHLORIDE 9 MG/ML
3 INJECTION INTRAVENOUS
Status: DISCONTINUED | OUTPATIENT
Start: 2025-05-31 | End: 2025-05-31 | Stop reason: HOSPADM

## 2025-05-31 RX ADMIN — SODIUM CHLORIDE 1000 ML: 0.9 INJECTION, SOLUTION INTRAVENOUS at 16:54

## 2025-05-31 RX ADMIN — DIPHENHYDRAMINE HYDROCHLORIDE AND LIDOCAINE HYDROCHLORIDE AND ALUMINUM HYDROXIDE AND MAGNESIUM HYDRO 10 ML: KIT at 16:56

## 2025-05-31 RX ADMIN — ALUMINUM HYDROXIDE, MAGNESIUM HYDROXIDE, AND DIMETHICONE 30 ML: 200; 20; 200 SUSPENSION ORAL at 16:56

## 2025-05-31 RX ADMIN — ACETAMINOPHEN 1000 MG: 10 INJECTION INTRAVENOUS at 17:37

## 2025-05-31 RX ADMIN — METOCLOPRAMIDE 10 MG: 5 INJECTION, SOLUTION INTRAMUSCULAR; INTRAVENOUS at 16:54

## 2025-05-31 NOTE — DISCHARGE INSTRUCTIONS
Pressure in your eye is elevated.  This could be a sign of glaucoma which could possibly make you go blind.  Follow-up with ophthalmology by calling them Monday at 8 AM.  Let them know you are seen in the [place] emergency department and that the doctor spoke with Dr. aCstillo.    Follow-up with cardiology as soon as possible regarding your chest pain.    Come back for new or worsening symptoms.

## 2025-05-31 NOTE — ED PROVIDER NOTES
Time reflects when diagnosis was documented in both MDM as applicable and the Disposition within this note       Time User Action Codes Description Comment    5/31/2025  6:07 PM Manjit Quinonez [R51.9] Headache     5/31/2025  6:08 PM Manjit Quinonez [H40.053] Elevated IOP, bilateral     5/31/2025  6:08 PM Manjit Quinonez [R07.9] Chest pain           ED Disposition       None          Assessment & Plan       Medical Decision Making  49-year-old male presenting with a mild headache.  States that he does not get headaches like this.  Is located behind his left eye.  No jaw claudication or pain around the temporal region.    On exam he has elevated intraocular pressures bilaterally.  Normal neurological exam.  Not maximal at onset of headache but since the patient states it is different than his normal headache will obtain a CT of the head.  Not suspicious for subarachnoid hemorrhage.    Patient also notes a chronic chest pain that comes and goes.  Relates it to eating food.  Believes it is indigestion.  Burning feeling located in the lower mid sternum.  Worse from food.  Better from sucralfate and Pepcid when he was on this medication.    Denies dyspnea, pleurisy, unilateral lower extremity edema, history of VTE.    Differential including but not limited to dyspepsia, gastroesophageal reflux, chest pain, ACS, headache, migraine, intracranial mass.  Do not suspect subarachnoid hemorrhage as not a significant headache, possible glaucoma as increased intraocular pressure.    Spoke with ophthalmology who recommended follow-up on Monday.  No eyedrops needed at this point.    Troponin normal.  Chest x-ray without acute findings per my interpretation.  ECG without changes.  Heart score of 5.  Will refer to cardiology per [facility] heart score protocol.    Signed out to evening physician pending delta troponin and CT head      Problems Addressed:  Chest pain: chronic illness or injury  Elevated IOP, bilateral:  acute illness or injury  Headache: acute illness or injury    Amount and/or Complexity of Data Reviewed  Labs: ordered. Decision-making details documented in ED Course.  Radiology: ordered and independent interpretation performed.    Risk  OTC drugs.  Prescription drug management.        ED Course as of 05/31/25 1840   Sat May 31, 2025   1713 Per Dr. Castillo.  No reason to start drops at this point.  Call office on the Monday to expedite follow-up.   1722 Creatinine(!): 1.74  Slightly above baseline   1723 Procedure Note: EKG  Date/Time: 05/31/25 5:23 PM   Interpreted by: Manjit Quinonez  Indications / Diagnosis: CP  ECG reviewed by me, the ED Provider: yes   The EKG demonstrates:  Rhythm: normal sinus  Intervals: normal intervals except wide qrs  Axis: L axis  QRS/Blocks: wide QRS, S waves generally unchanged from previous ECG. Nonspecific intraventricular conduction block  ST Changes: No acute ST Changes, no STD/MELISSA.     1807 Patient reports significant decrease in his headache.       Medications   sodium chloride 0.9 % bolus 1,000 mL (1,000 mL Intravenous New Bag 5/31/25 1654)   sodium chloride (PF) 0.9 % injection 3 mL (has no administration in time range)   aluminum-magnesium hydroxide-simethicone (MAALOX) oral suspension 30 mL (30 mL Oral Given 5/31/25 1656)   diphenhydramine, lidocaine, Al/Mg hydroxide, simethicone (Magic Mouthwash) oral solution 10 mL (10 mL Swish & Swallow Given 5/31/25 1656)   metoclopramide (REGLAN) injection 10 mg (10 mg Intravenous Given 5/31/25 1654)   acetaminophen (Ofirmev) injection 1,000 mg (0 mg Intravenous Stopped 5/31/25 1803)       ED Risk Strat Scores   HEART Risk Score      Flowsheet Row Most Recent Value   Heart Score Risk Calculator    History 0 Filed at: 05/31/2025 1808   ECG 1 Filed at: 05/31/2025 1808   Age 1 Filed at: 05/31/2025 1808   Risk Factors 2 Filed at: 05/31/2025 1808   Troponin 1 Filed at: 05/31/2025 1808   HEART Score 5 Filed at: 05/31/2025 1808           HEART Risk Score      Flowsheet Row Most Recent Value   Heart Score Risk Calculator    History 0 Filed at: 05/31/2025 1808   ECG 1 Filed at: 05/31/2025 1808   Age 1 Filed at: 05/31/2025 1808   Risk Factors 2 Filed at: 05/31/2025 1808   Troponin 1 Filed at: 05/31/2025 1808   HEART Score 5 Filed at: 05/31/2025 1808                      No data recorded        SBIRT 22yo+      Flowsheet Row Most Recent Value   Initial Alcohol Screen: US AUDIT-C     1. How often do you have a drink containing alcohol? 0 Filed at: 05/31/2025 1625   2. How many drinks containing alcohol do you have on a typical day you are drinking?  0 Filed at: 05/31/2025 1625   3a. Male UNDER 65: How often do you have five or more drinks on one occasion? 0 Filed at: 05/31/2025 1625   Audit-C Score 0 Filed at: 05/31/2025 1625   JAXSON: How many times in the past year have you...    Used an illegal drug or used a prescription medication for non-medical reasons? Never Filed at: 05/31/2025 1625                            History of Present Illness       Chief Complaint   Patient presents with    Headache     Patient c/o headache behind L eye that started yesterday and has become progressively worse. Denies n/v. Attempted Advil around 0930 with minimal relief. No new vision changes.     Heartburn     Patient reports intermittent heartburn that started after eating pork around 1500. Took Tums around 1515. Hx GERD       Past Medical History[1]   Past Surgical History[2]   Family History[3]   Social History[4]   E-Cigarette/Vaping    E-Cigarette Use Never User       E-Cigarette/Vaping Substances    Nicotine No     THC No     CBD No     Flavoring No     Other No     Unknown No       I have reviewed and agree with the history as documented.     49-year-old male previous history of DM2, CKD, hypertension, obesity, HLD presenting for headache, indigestion.    Patient reports headache starting yesterday.  Relieved with a dosage of ibuprofen.    Started back again  today.  Describes his headache as mild.  Located behind his left eye.  He reports no significant history of headaches.  Last headache this bad was in his teens and was related to a dental infection.  He denies any dental pain.    He denies any jaw claudication, temporal pain.    Reports recent visit with his optometrist where they did check intraocular pressure.    Denies numbness, weakness, inability to ambulate, strokelike symptoms.    Patient also reports symptoms that he describes as indigestion.  States it started after food intake.  Burning pain located in the lower mid sternum.    Reports that this pain comes and goes over the last few months.  Reports that it was relieved after he was seen here previously and placed on sucralfate and Pepcid.  Reports that he was taken off these medications and feels like his symptoms started back after this.  He denies any sour taste in the back of his mouth.    Denies any dyspnea.  The pain is not pleuritic.  He has no history of VTE, recent travel hospitalization, hemoptysis.    Eye doc - David Gonzalez              Review of Systems   Cardiovascular:  Positive for chest pain.   Neurological:  Positive for headaches.   All other systems reviewed and are negative.          Objective       ED Triage Vitals   Temperature Pulse Blood Pressure Respirations SpO2 Patient Position - Orthostatic VS   05/31/25 1624 05/31/25 1624 05/31/25 1624 05/31/25 1624 05/31/25 1624 05/31/25 1624   98.9 °F (37.2 °C) 82 (!) 171/81 18 97 % Lying      Temp Source Heart Rate Source BP Location FiO2 (%) Pain Score    05/31/25 1624 05/31/25 1624 05/31/25 1624 -- 05/31/25 1630    Oral Monitor Left arm  7      Vitals      Date and Time Temp Pulse SpO2 Resp BP Pain Score FACES Pain Rating User   05/31/25 1745 -- 80 97 % 18 126/62 2 -- FN   05/31/25 1630 -- 80 97 % 18 161/71 7 -- FN   05/31/25 1624 98.9 °F (37.2 °C) 82 97 % 18 171/81 -- -- DG            Physical Exam  Vitals and nursing note reviewed.    Constitutional:       General: He is not in acute distress.     Appearance: He is well-developed. He is not diaphoretic.   HENT:      Head: Normocephalic and atraumatic.      Right Ear: External ear normal.      Left Ear: External ear normal.     Eyes:      Conjunctiva/sclera: Conjunctivae normal.      Comments: IOP 27 on Left, 26.5 on Right.    Pupils reactive to light and accommodation.    Visual acuity of 20/20 in the left eye while wearing glasses.   Neck:      Trachea: No tracheal deviation.     Cardiovascular:      Rate and Rhythm: Normal rate and regular rhythm.      Heart sounds: Normal heart sounds. No murmur heard.  Pulmonary:      Effort: No respiratory distress.      Breath sounds: Normal breath sounds. No stridor. No wheezing or rales.   Abdominal:      General: Bowel sounds are normal. There is no distension.      Palpations: Abdomen is soft. There is no mass.      Tenderness: There is no abdominal tenderness. There is no guarding or rebound.     Musculoskeletal:         General: No tenderness or deformity.     Skin:     General: Skin is dry.      Findings: No rash.     Neurological:      Cranial Nerves: No cranial nerve deficit.      Sensory: No sensory deficit.      Motor: No weakness or abnormal muscle tone.      Coordination: Coordination normal.      Comments: Normal point-to-point.  Normal pronator drift.  Ambulates without any abnormalities.   Psychiatric:         Behavior: Behavior normal.         Thought Content: Thought content normal.         Judgment: Judgment normal.         Results Reviewed       Procedure Component Value Units Date/Time    HS Troponin I 2hr [[id number]]     Lab Status: No result Specimen: Blood     HS Troponin 0hr (reflex protocol) [[id number]]  (Normal) Collected: 05/31/25 1652    Lab Status: Final result Specimen: Blood from Arm, Right Updated: 05/31/25 1721     hs TnI 0hr 17 ng/L     Comprehensive metabolic panel [[id number]]  (Abnormal) Collected: 05/31/25 1652    Lab  Status: Final result Specimen: Blood from Arm, Right Updated: 05/31/25 1719     Sodium 135 mmol/L      Potassium 3.9 mmol/L      Chloride 104 mmol/L      CO2 21 mmol/L      ANION GAP 10 mmol/L      BUN 37 mg/dL      Creatinine 1.74 mg/dL      Glucose 121 mg/dL      Calcium 10.5 mg/dL      AST 26 U/L      ALT 29 U/L      Alkaline Phosphatase 43 U/L      Total Protein 7.2 g/dL      Albumin 4.7 g/dL      Total Bilirubin 0.42 mg/dL      eGFR 44 ml/min/1.73sq m     Narrative:      [employer] guidelines for Chronic Kidney Disease (CKD):     Stage 1 with normal or high GFR (GFR > 90 mL/min/1.73 square meters)    Stage 2 Mild CKD (GFR = 60-89 mL/min/1.73 square meters)    Stage 3A Moderate CKD (GFR = 45-59 mL/min/1.73 square meters)    Stage 3B Moderate CKD (GFR = 30-44 mL/min/1.73 square meters)    Stage 4 Severe CKD (GFR = 15-29 mL/min/1.73 square meters)    Stage 5 End Stage CKD (GFR <15 mL/min/1.73 square meters)  Note: GFR calculation is accurate only with a steady state creatinine    Lipase [[id number]]  (Normal) Collected: 05/31/25 1652    Lab Status: Final result Specimen: Blood from Arm, Right Updated: 05/31/25 1719     Lipase 61 u/L     CBC and differential [[id number]]  (Abnormal) Collected: 05/31/25 1652    Lab Status: Final result Specimen: Blood from Arm, Right Updated: 05/31/25 1657     WBC 6.64 Thousand/uL      RBC 4.01 Million/uL      Hemoglobin 12.1 g/dL      Hematocrit 35.3 %      MCV 88 fL      MCH 30.2 pg      MCHC 34.3 g/dL      RDW 12.8 %      MPV 9.2 fL      Platelets 153 Thousands/uL      nRBC 0 /100 WBCs      Segmented % 72 %      Immature Grans % 0 %      Lymphocytes % 19 %      Monocytes % 7 %      Eosinophils Relative 1 %      Basophils Relative 1 %      Absolute Neutrophils 4.74 Thousands/µL      Absolute Immature Grans 0.01 Thousand/uL      Absolute Lymphocytes 1.29 Thousands/µL      Absolute Monocytes 0.47 Thousand/µL      Eosinophils Absolute 0.09 Thousand/µL       Basophils Absolute 0.04 Thousands/µL             XR chest 2 views   ED Interpretation by Manjit Quinonez DO ([id number])   No acute cardiopulmonary finding.      CT head without contrast    (Results Pending)       Procedures    ED Medication and Procedure Management   Prior to Admission Medications   Prescriptions Last Dose Informant Patient Reported? Taking?   ALPRAZolam (XANAX) 0.5 mg tablet  Self Yes No   Sig: Take 0.5 mg by mouth every 8 (eight) hours as needed   Aspirin Buf,CaCarb-MgCarb-MgO, 81 MG TABS  Self Yes No   Sig: Take 1 tablet by mouth in the morning.   BD Pen Needle Cece 2nd Gen 32G X 4 MM MISC   No No   Sig: Use daily with Lantus   Cholecalciferol (Vitamin D) 50 MCG ( UT) CAPS  Self Yes No   Sig: Take 1 tablet by mouth in the morning.   Insulin Glargine Solostar (Basaglar KwikPen) 100 UNIT/ML SOPN   Yes No   Sig: Inject under the skin   Mounjaro 2.5 MG/0.5ML SOAJ   No No   Sig: Inject 2.5 mg under the skin once a week   Multiple Vitamin (MULTIVITAMIN ADULT PO)  Self Yes No   Sig: Take by mouth   True Metrix Blood Glucose Test test strip  Self Yes No   Si (two) times a day Test as directed   Patient not taking: Reported on 10/7/2024   acetaminophen (TYLENOL) 500 mg tablet  Self Yes No   Sig: Take 500 mg by mouth every 6 (six) hours as needed for mild pain   allopurinol (ZYLOPRIM) 100 mg tablet  Self Yes No   amLODIPine (NORVASC) 5 mg tablet  Self Yes No   ammonium lactate (LAC-HYDRIN) 12 % lotion   Yes No   Sig: APPLY THIN FILM TOPICALLY ONCE PER DAY.   dexamethasone (DECADRON) 1 mg tablet  Self No No   Sig: Take 1 tab at 11pm and get cortisol levels checked between 7:00 a.m. and 9:00 a.m. on the next morning.   Patient not taking: Reported on 3/31/2025   doxazosin (CARDURA) 4 mg tablet  Self Yes No   Sig: Take 4 mg by mouth in the morning and 4 mg in the evening.   ezetimibe (ZETIA) 10 mg tablet  Self Yes No   Sig: Take 10 mg by mouth in the morning.   famotidine (PEPCID) 20 mg tablet   Self No No   Sig: Take 1 tablet (20 mg total) by mouth 2 (two) times a day   hydrALAZINE (APRESOLINE) 50 mg tablet  Self Yes No   Sig: in the morning and in the evening.   hydrochlorothiazide (HYDRODIURIL) 25 mg tablet  Self Yes No   Sig: Take 25 mg by mouth in the morning.   ibuprofen (MOTRIN) 200 mg tablet  Self Yes No   Sig: Take 200 mg by mouth every 6 (six) hours as needed for mild pain   ketoconazole (NIZORAL) 2 % cream   Yes No   Sig: APPLY TO SCALY SKIN ON FEET 2 TIMES DAILY   losartan (COZAAR) 100 MG tablet  Self Yes No   metFORMIN (GLUCOPHAGE) 1000 MG tablet  Self Yes No   Sig: in the morning and in the evening. Take with meals.   metoprolol tartrate (LOPRESSOR) 50 mg tablet  Self Yes No   Sig: every 12 (twelve) hours   mupirocin (BACTROBAN) 2 % ointment   Yes No   Sig: APPLY THIN FILM TOPICALLY TO WOUND SITE TWICE DAILY.   polyethylene glycol-electrolytes (TriLyte) 4000 mL solution   No No   Sig: Take 4,000 mL by mouth once for 1 dose Take 4000 mL by mouth once for 1 dose. Use as directed   rosuvastatin (CRESTOR) 20 MG tablet  Self Yes No   Sig: Take 20 mg by mouth in the morning.   simvastatin (ZOCOR) 10 mg tablet  Self Yes No   Patient not taking: Reported on 3/31/2025   spironolactone (ALDACTONE) 25 mg tablet  Self Yes No   sucralfate (CARAFATE) 1 g tablet  Self No No   Sig: Take 1 tablet (1 g total) by mouth 4 (four) times a day   Patient not taking: Reported on 3/31/2025      Facility-Administered Medications: None     Patient's Medications   Discharge Prescriptions    SUCRALFATE (CARAFATE) 1 G TABLET    Take 1 tablet (1 g total) by mouth 4 (four) times a day for 14 days       Start Date: 5/31/2025 End Date: 6/14/2025       Order Dose: 1 g       Quantity: 56 tablet    Refills: 0       ED SEPSIS DOCUMENTATION   Time reflects when diagnosis was documented in both MDM as applicable and the Disposition within this note       Time User Action Codes Description Comment    5/31/2025  6:07 PM Cristiano  Manjit Add [R51.9] Headache     5/31/2025  6:08 PM Manjit Quinonez Add [H40.053] Elevated IOP, bilateral     5/31/2025  6:08 PM Manjit Quinonez [R07.9] Chest pain                    [1]   Past Medical History:  Diagnosis Date    Anxiety     Diabetes mellitus (HCC)     High cholesterol     HL (hearing loss)     Hypertension     Sleep apnea, obstructive     Sleep difficulties    [2]   Past Surgical History:  Procedure Laterality Date    INCISION AND DRAINAGE ANTERIOR NECK N/A 8/17/2022    Procedure: INCISION AND DRAINAGE  (I&D) NECK;  Surgeon: Robert Bloch, MD;  Location:  MAIN OR;  Service: General   [3] No family history on file.  [4]   Social History  Tobacco Use    Smoking status: Some Days     Types: Cigars    Smokeless tobacco: Current     Types: Chew    Tobacco comments:     chewing tobacco occasionally    Vaping Use    Vaping status: Never Used   Substance Use Topics    Alcohol use: Never    Drug use: Never        Manjit Quinonez DO  05/31/25 1846

## 2025-05-31 NOTE — ED NOTES
Confirmed Dose of Maalox and Magic mouthwash with provider. Dr. Quinonez confirms administration instructions.      Zuleima Perez RN  05/31/25 1915

## 2025-06-01 LAB
ATRIAL RATE: 74 BPM
ATRIAL RATE: 78 BPM
P AXIS: 45 DEGREES
P AXIS: 47 DEGREES
PR INTERVAL: 166 MS
PR INTERVAL: 168 MS
QRS AXIS: -35 DEGREES
QRS AXIS: -40 DEGREES
QRSD INTERVAL: 132 MS
QRSD INTERVAL: 136 MS
QT INTERVAL: 366 MS
QT INTERVAL: 390 MS
QTC INTERVAL: 417 MS
QTC INTERVAL: 432 MS
T WAVE AXIS: 38 DEGREES
T WAVE AXIS: 45 DEGREES
VENTRICULAR RATE: 74 BPM
VENTRICULAR RATE: 78 BPM

## 2025-06-01 PROCEDURE — 93010 ELECTROCARDIOGRAM REPORT: CPT | Performed by: INTERNAL MEDICINE

## 2025-06-03 ENCOUNTER — HOSPITAL ENCOUNTER (OUTPATIENT)
Dept: GASTROENTEROLOGY | Facility: HOSPITAL | Age: 50
Setting detail: OUTPATIENT SURGERY
Discharge: HOME | End: 2025-06-03
Attending: NURSE PRACTITIONER
Payer: COMMERCIAL

## 2025-06-03 ENCOUNTER — ANESTHESIA (OUTPATIENT)
Dept: GASTROENTEROLOGY | Facility: HOSPITAL | Age: 50
End: 2025-06-03
Payer: COMMERCIAL

## 2025-06-03 ENCOUNTER — ANESTHESIA EVENT (OUTPATIENT)
Dept: GASTROENTEROLOGY | Facility: HOSPITAL | Age: 50
End: 2025-06-03
Payer: COMMERCIAL

## 2025-06-03 VITALS
TEMPERATURE: 97.6 F | OXYGEN SATURATION: 98 % | WEIGHT: 313 LBS | SYSTOLIC BLOOD PRESSURE: 147 MMHG | HEIGHT: 69 IN | BODY MASS INDEX: 46.36 KG/M2 | RESPIRATION RATE: 14 BRPM | DIASTOLIC BLOOD PRESSURE: 72 MMHG | HEART RATE: 71 BPM

## 2025-06-03 DIAGNOSIS — R13.10 DYSPHAGIA, UNSPECIFIED TYPE: ICD-10-CM

## 2025-06-03 DIAGNOSIS — K21.9 GERD (GASTROESOPHAGEAL REFLUX DISEASE): ICD-10-CM

## 2025-06-03 DIAGNOSIS — K86.2 PANCREATIC CYST: ICD-10-CM

## 2025-06-03 DIAGNOSIS — Z12.11 SCREENING FOR COLON CANCER: ICD-10-CM

## 2025-06-03 LAB — GLUCOSE SERPL-MCNC: 102 MG/DL (ref 65–140)

## 2025-06-03 PROCEDURE — 43239 EGD BIOPSY SINGLE/MULTIPLE: CPT | Performed by: INTERNAL MEDICINE

## 2025-06-03 PROCEDURE — 88341 IMHCHEM/IMCYTCHM EA ADD ANTB: CPT | Performed by: PATHOLOGY

## 2025-06-03 PROCEDURE — 82948 REAGENT STRIP/BLOOD GLUCOSE: CPT

## 2025-06-03 PROCEDURE — 88342 IMHCHEM/IMCYTCHM 1ST ANTB: CPT | Performed by: PATHOLOGY

## 2025-06-03 PROCEDURE — 45380 COLONOSCOPY AND BIOPSY: CPT | Performed by: INTERNAL MEDICINE

## 2025-06-03 PROCEDURE — 88305 TISSUE EXAM BY PATHOLOGIST: CPT | Performed by: PATHOLOGY

## 2025-06-03 RX ORDER — KETAMINE HCL IN NACL, ISO-OSM 100MG/10ML
SYRINGE (ML) INJECTION AS NEEDED
Status: DISCONTINUED | OUTPATIENT
Start: 2025-06-03 | End: 2025-06-03

## 2025-06-03 RX ORDER — SODIUM CHLORIDE, SODIUM LACTATE, POTASSIUM CHLORIDE, CALCIUM CHLORIDE 600; 310; 30; 20 MG/100ML; MG/100ML; MG/100ML; MG/100ML
INJECTION, SOLUTION INTRAVENOUS CONTINUOUS PRN
Status: DISCONTINUED | OUTPATIENT
Start: 2025-06-03 | End: 2025-06-03

## 2025-06-03 RX ORDER — GLYCOPYRROLATE 0.2 MG/ML
INJECTION INTRAMUSCULAR; INTRAVENOUS AS NEEDED
Status: DISCONTINUED | OUTPATIENT
Start: 2025-06-03 | End: 2025-06-03

## 2025-06-03 RX ORDER — PROPOFOL 10 MG/ML
INJECTION, EMULSION INTRAVENOUS AS NEEDED
Status: DISCONTINUED | OUTPATIENT
Start: 2025-06-03 | End: 2025-06-03

## 2025-06-03 RX ADMIN — PROPOFOL 25 MG: 10 INJECTION, EMULSION INTRAVENOUS at 15:51

## 2025-06-03 RX ADMIN — PROPOFOL 25 MG: 10 INJECTION, EMULSION INTRAVENOUS at 15:33

## 2025-06-03 RX ADMIN — PROPOFOL 25 MG: 10 INJECTION, EMULSION INTRAVENOUS at 15:36

## 2025-06-03 RX ADMIN — PROPOFOL 25 MG: 10 INJECTION, EMULSION INTRAVENOUS at 15:38

## 2025-06-03 RX ADMIN — PROPOFOL 25 MG: 10 INJECTION, EMULSION INTRAVENOUS at 15:54

## 2025-06-03 RX ADMIN — GLYCOPYRROLATE 0.2 MG: 0.2 INJECTION, SOLUTION INTRAMUSCULAR; INTRAVENOUS at 15:25

## 2025-06-03 RX ADMIN — Medication 10 MG: at 15:45

## 2025-06-03 RX ADMIN — PROPOFOL 25 MG: 10 INJECTION, EMULSION INTRAVENOUS at 15:44

## 2025-06-03 RX ADMIN — PROPOFOL 25 MG: 10 INJECTION, EMULSION INTRAVENOUS at 15:53

## 2025-06-03 RX ADMIN — PROPOFOL 50 MG: 10 INJECTION, EMULSION INTRAVENOUS at 15:50

## 2025-06-03 RX ADMIN — PROPOFOL 25 MG: 10 INJECTION, EMULSION INTRAVENOUS at 15:34

## 2025-06-03 RX ADMIN — PROPOFOL 25 MG: 10 INJECTION, EMULSION INTRAVENOUS at 15:55

## 2025-06-03 RX ADMIN — PROPOFOL 25 MG: 10 INJECTION, EMULSION INTRAVENOUS at 15:46

## 2025-06-03 RX ADMIN — PROPOFOL 75 MG: 10 INJECTION, EMULSION INTRAVENOUS at 15:32

## 2025-06-03 RX ADMIN — PROPOFOL 25 MG: 10 INJECTION, EMULSION INTRAVENOUS at 15:48

## 2025-06-03 RX ADMIN — PROPOFOL 25 MG: 10 INJECTION, EMULSION INTRAVENOUS at 15:42

## 2025-06-03 RX ADMIN — TOPICAL ANESTHETIC 2 SPRAY: 200 SPRAY DENTAL; PERIODONTAL at 15:30

## 2025-06-03 RX ADMIN — SODIUM CHLORIDE, SODIUM LACTATE, POTASSIUM CHLORIDE, AND CALCIUM CHLORIDE: .6; .31; .03; .02 INJECTION, SOLUTION INTRAVENOUS at 15:19

## 2025-06-03 RX ADMIN — Medication 20 MG: at 15:32

## 2025-06-03 RX ADMIN — PROPOFOL 25 MG: 10 INJECTION, EMULSION INTRAVENOUS at 15:56

## 2025-06-03 RX ADMIN — PROPOFOL 25 MG: 10 INJECTION, EMULSION INTRAVENOUS at 15:40

## 2025-06-03 RX ADMIN — PROPOFOL 25 MG: 10 INJECTION, EMULSION INTRAVENOUS at 15:52

## 2025-06-03 NOTE — ANESTHESIA PREPROCEDURE EVALUATION
Procedure:  COLONOSCOPY  EGD    Relevant Problems   CARDIO   (+) Chest pain   (+) Hyperlipidemia   (+) Hypertension      ENDO   (+) Type 2 diabetes mellitus with hyperglycemia, with long-term current use of insulin (HCC)   (+) Type 2 diabetes mellitus with stage 2 chronic kidney disease, with long-term current use of insulin (HCC)      /RENAL   (+) Stage 3a chronic kidney disease (HCC)      PULMONARY   (+) SANJIV (obstructive sleep apnea)      Other   (+) Elevated serum creatinine   (+) Morbid obesity (HCC)        Physical Exam    Airway     Mallampati score: II  TM Distance: >3 FB  Neck ROM: full      Cardiovascular      Dental   No notable dental hx     Pulmonary      Neurological      Other Findings        Anesthesia Plan  ASA Score- 3     Anesthesia Type- IV sedation with anesthesia with ASA Monitors.         Additional Monitors:     Airway Plan:            Plan Factors-Exercise tolerance (METS): >4 METS.    Chart reviewed.    Patient summary reviewed.    Patient is not a current smoker.  Patient did not smoke on day of surgery.            Induction- intravenous.    Postoperative Plan- .   Monitoring Plan - Monitoring plan - standard ASA monitoring      Perioperative Resuscitation Plan - Level 1 - Full Code.       Informed Consent- Anesthetic plan and risks discussed with patient.  I personally reviewed this patient with the CRNA. Discussed and agreed on the Anesthesia Plan with the CRNA..      NPO Status:  No vitals data found for the desired time range.

## 2025-06-03 NOTE — ANESTHESIA POSTPROCEDURE EVALUATION
Post-Op Assessment Note    CV Status:  Stable    Pain management: adequate       Mental Status:  Lethargic and sleepy   Hydration Status:  Stable   PONV Controlled:  None   Airway Patency:  Patent     Post Op Vitals Reviewed: Yes    No anethesia notable event occurred.    Staff: CRNA           Last Filed PACU Vitals:  Vitals Value Taken Time   Temp 97.6 °F (36.4 °C) 06/03/25 16:04   Pulse 73 06/03/25 16:04   /62 06/03/25 16:04   Resp 18 06/03/25 16:04   SpO2 96 % 06/03/25 16:04       Modified Austen:     Vitals Value Taken Time   Activity 2 06/03/25 16:04   Respiration 2 06/03/25 16:04   Circulation 2 06/03/25 16:04   Consciousness 2 06/03/25 16:04   Oxygen Saturation 2 06/03/25 16:04     Modified Austen Score: 10

## 2025-06-03 NOTE — H&P
"History and Physical -  [facility]  Vel Fuentes 49 y.o. male MRN: [id number]                  HPI: Vel Fuentes is a 49 y.o. year old male who presents for dysphagia, colon cancer screening      REVIEW OF SYSTEMS: Per the HPI, and otherwise unremarkable.    Historical Information   Past Medical History[1]  Past Surgical History[2]  Social History   Social History     Substance and Sexual Activity   Alcohol Use Never     Social History     Substance and Sexual Activity   Drug Use Never     Tobacco Use History[3]  Family History[4]    Meds/Allergies     Current Medications[5]    Allergies[6]    Objective     /69   Pulse 75   Resp 16   Ht 5' 9\" (1.753 m)   Wt (!) 142 kg (313 lb)   SpO2 98%   BMI 46.22 kg/m²       PHYSICAL EXAM    Gen: NAD  Head: NCAT  CV: RRR  CHEST: Clear  ABD: soft, NT/ND  EXT: no edema      ASSESSMENT/PLAN:  This is a 49 y.o. year old male here for EGD, colonoscopy, and he is stable and optimized for his procedure.             [1]   Past Medical History:  Diagnosis Date    Anxiety     Diabetes mellitus (HCC)     High cholesterol     HL (hearing loss)     Hypertension     Sleep apnea, obstructive     Sleep difficulties    [2]   Past Surgical History:  Procedure Laterality Date    INCISION AND DRAINAGE ANTERIOR NECK N/A 8/17/2022    Procedure: INCISION AND DRAINAGE  (I&D) NECK;  Surgeon: Robert Bloch, MD;  Location:  MAIN OR;  Service: General   [3]   Social History  Tobacco Use   Smoking Status Some Days    Types: Cigars   Smokeless Tobacco Current    Types: Chew   Tobacco Comments    chewing tobacco occasionally    [4] No family history on file.  [5]   Current Outpatient Medications:     amLODIPine (NORVASC) 5 mg tablet    doxazosin (CARDURA) 4 mg tablet    ezetimibe (ZETIA) 10 mg tablet    famotidine (PEPCID) 20 mg tablet    hydrALAZINE (APRESOLINE) 50 mg tablet    Insulin Glargine Solostar (Basaglar KwikPen) 100 UNIT/ML SOPN    losartan (COZAAR) 100 " MG tablet    metFORMIN (GLUCOPHAGE) 1000 MG tablet    metoprolol tartrate (LOPRESSOR) 50 mg tablet    Mounjaro 2.5 MG/0.5ML SOAJ    rosuvastatin (CRESTOR) 20 MG tablet    spironolactone (ALDACTONE) 25 mg tablet    acetaminophen (TYLENOL) 500 mg tablet    allopurinol (ZYLOPRIM) 100 mg tablet    ALPRAZolam (XANAX) 0.5 mg tablet    ammonium lactate (LAC-HYDRIN) 12 % lotion    Aspirin Buf,CaCarb-MgCarb-MgO, 81 MG TABS    BD Pen Needle Cece 2nd Gen 32G X 4 MM MISC    Cholecalciferol (Vitamin D) 50 MCG (2000 UT) CAPS    dexamethasone (DECADRON) 1 mg tablet    hydrochlorothiazide (HYDRODIURIL) 25 mg tablet    ibuprofen (MOTRIN) 200 mg tablet    ketoconazole (NIZORAL) 2 % cream    Multiple Vitamin (MULTIVITAMIN ADULT PO)    mupirocin (BACTROBAN) 2 % ointment    polyethylene glycol-electrolytes (TriLyte) 4000 mL solution    simvastatin (ZOCOR) 10 mg tablet    sucralfate (CARAFATE) 1 g tablet    True Metrix Blood Glucose Test test strip  [6] No Known Allergies

## 2025-06-06 PROCEDURE — 88305 TISSUE EXAM BY PATHOLOGIST: CPT | Performed by: PATHOLOGY

## 2025-06-06 PROCEDURE — 88341 IMHCHEM/IMCYTCHM EA ADD ANTB: CPT | Performed by: PATHOLOGY

## 2025-06-06 PROCEDURE — 88342 IMHCHEM/IMCYTCHM 1ST ANTB: CPT | Performed by: PATHOLOGY

## 2025-06-23 ENCOUNTER — OFFICE VISIT (OUTPATIENT)
Dept: ENDOCRINOLOGY | Facility: CLINIC | Age: 50
End: 2025-06-23
Payer: COMMERCIAL

## 2025-06-23 VITALS
HEART RATE: 78 BPM | OXYGEN SATURATION: 98 % | WEIGHT: 309 LBS | HEIGHT: 69 IN | BODY MASS INDEX: 45.77 KG/M2 | TEMPERATURE: 98.7 F | DIASTOLIC BLOOD PRESSURE: 70 MMHG | SYSTOLIC BLOOD PRESSURE: 130 MMHG

## 2025-06-23 DIAGNOSIS — N18.2 TYPE 2 DIABETES MELLITUS WITH STAGE 2 CHRONIC KIDNEY DISEASE, WITH LONG-TERM CURRENT USE OF INSULIN (HCC): ICD-10-CM

## 2025-06-23 DIAGNOSIS — Z79.4 TYPE 2 DIABETES MELLITUS WITH HYPERGLYCEMIA, WITH LONG-TERM CURRENT USE OF INSULIN (HCC): Primary | ICD-10-CM

## 2025-06-23 DIAGNOSIS — N18.31 STAGE 3A CHRONIC KIDNEY DISEASE (HCC): ICD-10-CM

## 2025-06-23 DIAGNOSIS — E11.65 TYPE 2 DIABETES MELLITUS WITH HYPERGLYCEMIA, WITH LONG-TERM CURRENT USE OF INSULIN (HCC): Primary | ICD-10-CM

## 2025-06-23 DIAGNOSIS — E66.01 MORBID OBESITY (HCC): ICD-10-CM

## 2025-06-23 DIAGNOSIS — E78.2 MIXED HYPERLIPIDEMIA: ICD-10-CM

## 2025-06-23 DIAGNOSIS — E11.22 TYPE 2 DIABETES MELLITUS WITH STAGE 2 CHRONIC KIDNEY DISEASE, WITH LONG-TERM CURRENT USE OF INSULIN (HCC): ICD-10-CM

## 2025-06-23 DIAGNOSIS — Z79.4 TYPE 2 DIABETES MELLITUS WITH STAGE 2 CHRONIC KIDNEY DISEASE, WITH LONG-TERM CURRENT USE OF INSULIN (HCC): ICD-10-CM

## 2025-06-23 DIAGNOSIS — E55.9 VITAMIN D DEFICIENCY: ICD-10-CM

## 2025-06-23 DIAGNOSIS — E29.1 HYPOGONADISM, MALE: ICD-10-CM

## 2025-06-23 LAB — SL AMB POCT HEMOGLOBIN AIC: 7.3 (ref ?–6.5)

## 2025-06-23 PROCEDURE — 83036 HEMOGLOBIN GLYCOSYLATED A1C: CPT | Performed by: INTERNAL MEDICINE

## 2025-06-23 PROCEDURE — 99214 OFFICE O/P EST MOD 30 MIN: CPT | Performed by: INTERNAL MEDICINE

## 2025-06-23 RX ORDER — TIRZEPATIDE 2.5 MG/.5ML
2.5 INJECTION, SOLUTION SUBCUTANEOUS WEEKLY
Qty: 2 ML | Refills: 0 | Status: SHIPPED | OUTPATIENT
Start: 2025-06-23

## 2025-06-23 NOTE — PROGRESS NOTES
"    Follow-up Patient Progress Note      CC: Diabetes     History of Present Illness:   49 yr male with T2DM, HTN, HLD, SANJIV, morbid obesity BMI 48, CKD 3a and vit D deficiency. Last visit was 3/31/25.     Since last visit, he lost 9 lbs. He could not get started with insulin and GLP 1 agonist due to financial issues.     Mac adult weight: 350 lbs. Lowest adult weight: 300 lbs in 2018.     SAGM:     Current meds:  Metformin 1000mg PO BID  Glargine 20u QHS  Mounjaro 2.5mg weekly     Opthamology: yes, 1yr ago - due soon  Podiatry: sent referral  vaccination:   Dental:  Pancreatitis:      Ace/ARB: losartan  Statin: zocor  Thyroid issues:         Physical Exam:  Body mass index is 45.63 kg/m².  /70 (BP Location: Left arm, Patient Position: Sitting, Cuff Size: Standard)   Pulse 78   Temp 98.7 °F (37.1 °C) (Tympanic)   Ht 5' 9\" (1.753 m)   Wt (!) 140 kg (309 lb)   SpO2 98%   BMI 45.63 kg/m²    Vitals:    06/23/25 1159   Weight: (!) 140 kg (309 lb)        Physical Exam  Constitutional:       General: He is not in acute distress.     Appearance: He is well-developed.   HENT:      Head: Normocephalic and atraumatic.      Nose: Nose normal.     Eyes:      Conjunctiva/sclera: Conjunctivae normal.     Pulmonary:      Effort: Pulmonary effort is normal.   Abdominal:      General: There is no distension.     Musculoskeletal:      Cervical back: Normal range of motion and neck supple.     Skin:     Findings: No rash.      Comments: No icterus     Neurological:      Mental Status: He is alert and oriented to person, place, and time.         Labs:   Lab Results   Component Value Date    HGBA1C 7.3 (A) 06/23/2025       Lab Results   Component Value Date    BNQ6AJOURNBL 1.140 02/14/2025    TSH 1.14 04/29/2025       Lab Results   Component Value Date    CREATININE 1.74 (H) 05/31/2025    CREATININE 1.60 (H) 02/14/2025    CREATININE 1.40 (H) 02/06/2023    BUN 37 (H) 05/31/2025    K 3.9 05/31/2025     05/31/2025    CO2 21 " 05/31/2025     eGFR   Date Value Ref Range Status   05/31/2025 44 ml/min/1.73sq m Final       Lab Results   Component Value Date    ALT 29 05/31/2025    AST 26 05/31/2025    ALKPHOS 43 05/31/2025       Lab Results   Component Value Date    CHOLESTEROL 109 02/05/2023     Lab Results   Component Value Date    HDL 32 (L) 02/05/2023     Lab Results   Component Value Date    TRIG 324 (H) 02/05/2023     Lab Results   Component Value Date    NONHDLC 77 02/05/2023         Assessment/Plan:    1. Type 2 diabetes mellitus with hyperglycemia, with long-term current use of insulin ([facility])  Assessment & Plan:  He seems uncontrolled but A1c improved. POC A1c was 7.3 from 11%. Diabetes is complicated by obesity. Note 9 lb weight loss.    We agreed to continue  Metformin 1000mg PO BID, Glargine 20u QHS and resent Rx for Mounjaro 2.5mg weekly.  Continue diet and lifestyle changes.    Follow up in 3 months.  He may get sampled of GLP 1 agonist if available.      Lab Results   Component Value Date    HGBA1C 7.3 (A) 06/23/2025     Orders:  -     Mounjaro 2.5 MG/0.5ML SOAJ; Inject 2.5 mg under the skin once a week  -     Hemoglobin A1C; Future  -     POCT hemoglobin A1c  2. Hypogonadism, male  Assessment & Plan:  Today we discussed all aspects of hypogonadism including normal physiology, pathophysiology, contributing systemic conditions, common symptoms, management principles, complications of treatment, treatment contracts for testosterone replacement and goals of therapy.  Advised to do labs ordered last visit    3. Stage 3a chronic kidney disease (HCC)  4. Morbid obesity ([facility])  5. Mixed hyperlipidemia  6. Vitamin D deficiency  7. Type 2 diabetes mellitus with stage 2 chronic kidney disease, with long-term current use of insulin ([facility])  Assessment & Plan:  A1c is improving.    Lab Results   Component Value Date    HGBA1C 7.3 (A) 06/23/2025         I have spent a total time of  minutes on 06/23/25 in caring for this patient including  greater than 50% of this time was spent in counseling/coordination of care as listed above.       Discussed with the patient and all questioned fully answered. He will contact me with concerns.    Willian Severino

## 2025-06-29 NOTE — DISCHARGE INSTR - AVS FIRST PAGE
Dr. Wagoner  Post-Operative Instructions    1. Please use over the counter tylenol and ibuprofen in turns for pain control if needed according to the labeled instructions.   2. Upon arrival at home, lie down and elevate your surgical foot on 2 pillows.  3. Remain quiet, off your feet as much as possible, for the first 24-48 hours. This is when your feet first swell and may become painful. After 48 hours you may begin limited walking following these restrictions: weight bearing as tolerated to the right lower extremity  4. Drink large quantities of water. Consume no alcohol. Continue a well-balanced diet.  5. Report any unusual discomfort or fever to this office.  6. A limited amount of discomfort and swelling is to be expected. In some cases the skin may take on a bruised appearance. The surgical solution that was applied to your foot prior to the operation is dark in color and the operation site may appear to be oozing when it actually is not.  7. A slight amount of blood is to be expected, and is no cause for alarm. Do not remove the dressings. If there is active bleeding and if the bleeding persists, add additional gauze to the bandage, apply direct pressure, elevate your feet and call this office.  8. Do not get the dressings wet. As regular bathing may be inconvenient, sponge baths are recommended. If you shower, make sure the dressing stays dry.   9. When anesthesia wears off and if any discomfort should be present, apply an ice pack directly over the operated area for 15 minute intervals for several hours or until the pain leaves. (USE IN EXCESS OF 15 MINUTES COULD CAUSE FROSTBITE). Do not use hot water bags or electric pads. A convenient icepack can be made by placing ice cubes in a plastic bag and covering this with a towel.  10. If necessary, take a mild laxative before retiring.  11. Wear your special open shoes anytime you put weight on your foot, even if it is just to walk to the bathroom and back. It  will probably be 2 or 3 weeks before you will be permitted to try regular shoes.  12. Having performed the operation, we are interested in a prompt recovery. Please cooperate by following the above instructions.  13. Please call to confirm your post-op appointment or call with any other questions.

## 2025-06-30 ENCOUNTER — HOSPITAL ENCOUNTER (OUTPATIENT)
Facility: HOSPITAL | Age: 50
Setting detail: OUTPATIENT SURGERY
Discharge: HOME | End: 2025-06-30
Attending: STUDENT IN AN ORGANIZED HEALTH CARE EDUCATION/TRAINING PROGRAM | Admitting: STUDENT IN AN ORGANIZED HEALTH CARE EDUCATION/TRAINING PROGRAM
Payer: COMMERCIAL

## 2025-06-30 VITALS
HEART RATE: 67 BPM | DIASTOLIC BLOOD PRESSURE: 91 MMHG | OXYGEN SATURATION: 95 % | BODY MASS INDEX: 45.32 KG/M2 | TEMPERATURE: 97.5 F | WEIGHT: 306 LBS | HEIGHT: 69 IN | SYSTOLIC BLOOD PRESSURE: 169 MMHG | RESPIRATION RATE: 18 BRPM

## 2025-06-30 LAB — GLUCOSE SERPL-MCNC: 346 MG/DL (ref 65–140)

## 2025-06-30 PROCEDURE — A2015: HCPCS | Performed by: STUDENT IN AN ORGANIZED HEALTH CARE EDUCATION/TRAINING PROGRAM

## 2025-06-30 PROCEDURE — 82948 REAGENT STRIP/BLOOD GLUCOSE: CPT

## 2025-06-30 DEVICE — IMPLANTABLE DEVICE: Type: IMPLANTABLE DEVICE | Site: FOOT | Status: FUNCTIONAL

## 2025-06-30 RX ORDER — LIDOCAINE HYDROCHLORIDE 10 MG/ML
INJECTION, SOLUTION EPIDURAL; INFILTRATION; INTRACAUDAL; PERINEURAL AS NEEDED
Status: DISCONTINUED | OUTPATIENT
Start: 2025-06-30 | End: 2025-06-30 | Stop reason: HOSPADM

## 2025-06-30 NOTE — ASSESSMENT & PLAN NOTE
He seems uncontrolled but A1c improved. POC A1c was 7.3 from 11%. Diabetes is complicated by obesity. Note 9 lb weight loss.    We agreed to continue  Metformin 1000mg PO BID, Glargine 20u QHS and resent Rx for Mounjaro 2.5mg weekly.  Continue diet and lifestyle changes.    Follow up in 3 months.  He may get sampled of GLP 1 agonist if available.      Lab Results   Component Value Date    HGBA1C 7.3 (A) 06/23/2025

## 2025-06-30 NOTE — DISCHARGE SUMMARY
Discharge Summary Outpatient Procedure Podiatry -   Vel Fuentes 49 y.o. male MRN: [id number]  Unit/Bed#: OR POOL Encounter: [id number]    Admission Date: 6/30/2025     Admitting Diagnosis: Type 2 diabetes mellitus with foot ulcer (CODE) ([facility]) [E11.621]  Non-pressure chronic ulcer of right calf with fat layer exposed ([facility]) [L97.212]    Discharge Diagnosis: same    Procedures Performed: RIGHT LEG WOUND BED PREPARATION WITH APPLICATION OF SYNTHETIC GRAFT: 45809 (CPT®)      Complications: none    Condition at Discharge: stable    Discharge instructions/Information to patient and family:   See after visit summary for information provided to patient and family.      Provisions for Follow-Up Care/Important appointments:  See after visit summary for information related to follow-up care and any pertinent home health orders.      Discharge Medications:  See after visit summary for reconciled discharge medications provided to patient and family.

## 2025-06-30 NOTE — OP NOTE
OPERATIVE REPORT - Podiatry  PATIENT NAME: Vel Fuentes    :  1975  MRN: [id number]  Pt Location: [facility] OR ROOM 03    SURGERY DATE: 2025    Surgeons and Role:     * Lee Wagoner DPM - Primary     * Adal Galeas DPM - Assisting    Pre-op Diagnosis:  Type 2 diabetes mellitus with foot ulcer (CODE) ([facility]) [E11.621]  Non-pressure chronic ulcer of right calf with fat layer exposed ([facility]) [L97.212]    Post-Op Diagnosis Codes:     * Type 2 diabetes mellitus with foot ulcer (CODE) ([facility]) [E11.621]     * Non-pressure chronic ulcer of right calf with fat layer exposed (HCC) [L97.212]    Procedure(s) (LRB):  RIGHT LEG WOUND BED PREPARATION WITH APPLICATION OF SYNTHETIC GRAFT (Right)    Specimen(s):  * No specimens in log *    Estimated Blood Loss:   Minimal    Drains:  * No LDAs found *    Anesthesia Type:   Local with 10 ml of 1% Lidocaine     Hemostasis:  Direct compression    Materials:  Implant Name Type Inv. Item Serial No.  Lot No. LRB No. Used Action   (5 SQ CM) [place] WND MATRIX 2.5 X 2.5CM [facility] - [id number]  (5 SQ CM) [place] W[employer] 2.5 X 2.5CM [facility] [id number] [employer] [facility] [id number] Right 1 Implanted     Monocryl suture    Operative Findings:  Right lateral leg wound fully excisionally debrided to remove any devitalized tissue or slough until the wound bed shows healthy bleeding subcutaneous tissue. No acute signs of infection is found.   Predebridement wound size is 1.5 cm x 1.0 cm x 0.1 cm, post debridement wound size is 1.7 cm x 1.1 cm x 0.2 cm. Debridement is deep to and include subcutaneous tissue. 100% of the wound bed was debrided.   [place] synthetic substitute 2.5 cm x 2.5 cm was fully applied to the wound bed and secured with Monocryl suture.   Adaptic saline wet to dry dressing was applied and further gentle compression with ACE bandage was added.     Complications:   None    Procedure and Technique:     Under mild sedation, the patient was brought into the operating room  "and placed on the operating room table in the supine position. IV sedation was achieved by anesthesia team and a universal timeout was performed where all parties are in agreement of correct patient, correct procedure and correct site.  A regional block was performed consisting of 10 ml of 1% Lidocaine. The foot was then prepped and draped in the usual aseptic manner.     Attention was directed to the left lateral back.  A small wound was found with no apparent signs of infection.  A bone curette was used to remove any slough or dead tissue.  Healthy bleeding was found after the debridement.  There is no purulence or any other drainage.  Copious amount of sterile saline was used to irrigate the wound.  The wound was further applied with vancomycin powder.  A piece of [place] synthetic substitute was folded to the shape of the wound bed and secured with Monocryl suture.  The wound was further dressed with Betadine, saline wet-to-dry dressing, ABD and Emilee.  Ace bandage from behind the toe to below the knee was applied with gentle compression.    The patient tolerated the procedure and anesthesia well without immediate complications and transferred to PACU with vital signs stable.     As with many limb salvage procedures, we contemplate the possibility of performing further stages to this procedure. Procedures may include debridements, delayed closure, plastic surgery techniques, or more proximal amputations. This procedure may be considered part of a multi-staged limb salvage treatment plan.     Dr. Wagoner was present during the entire procedure and participated in all key aspects.    SIGNATURE: Adal Galeas DPM  DATE: June 30, 2025  TIME: 4:33 PM      Portions of the record may have been created with voice recognition software. Occasional wrong word or \"sound a like\" substitutions may have occurred due to the inherent limitations of voice recognition software. Read the chart carefully and recognize, using context, " where substitutions have occurred.

## 2025-07-22 LAB
LEFT EYE DIABETIC RETINOPATHY: NORMAL
RIGHT EYE DIABETIC RETINOPATHY: NORMAL

## (undated) DEVICE — GLOVE SRG BIOGEL 6.5

## (undated) DEVICE — PACK PBDS SMALL EXTREMITY RF

## (undated) DEVICE — Device

## (undated) DEVICE — CULTURE TUBE ANAEROBIC

## (undated) DEVICE — POOLE SUCTION HANDLE: Brand: CARDINAL HEALTH

## (undated) DEVICE — GLOVE SRG BIOGEL 7.5

## (undated) DEVICE — ABDOMINAL PAD: Brand: DERMACEA

## (undated) DEVICE — PLUMEPEN PRO 10FT

## (undated) DEVICE — CURITY PLAIN PACKING STRIP: Brand: CURITY

## (undated) DEVICE — SLEEVE SCD KNEE MEDIUM

## (undated) DEVICE — CULTURE TUBE AEROBIC

## (undated) DEVICE — DRAPE PROBE NEO-PROBE/ULTRASOUND

## (undated) DEVICE — DRAPE SHEET THREE QUARTER

## (undated) DEVICE — COVER LIGHT HANDLE RIGID -2/PK

## (undated) DEVICE — GLOVE INDICATOR PI UNDERGLOVE SZ 7.5 BLUE

## (undated) DEVICE — GLOVE INDICATOR PI UNDERGLOVE SZ 6.5 BLUE

## (undated) DEVICE — SYRINGE 10ML LL

## (undated) DEVICE — BETHLEHEM UNIVERSAL OUTPATIENT: Brand: CARDINAL HEALTH

## (undated) DEVICE — ASTOUND STANDARD SURGICAL GOWN, XL: Brand: CONVERTORS

## (undated) DEVICE — NEEDLE 18 G X 1 1/2 SAFETY

## (undated) DEVICE — GROUNDING PAD UNIVERSAL SLW